# Patient Record
Sex: FEMALE | Race: WHITE | Employment: OTHER | URBAN - METROPOLITAN AREA
[De-identification: names, ages, dates, MRNs, and addresses within clinical notes are randomized per-mention and may not be internally consistent; named-entity substitution may affect disease eponyms.]

---

## 2017-01-27 ENCOUNTER — TELEPHONE (OUTPATIENT)
Dept: FAMILY MEDICINE CLINIC | Age: 72
End: 2017-01-27

## 2017-01-29 RX ORDER — BUPROPION HYDROCHLORIDE 150 MG/1
TABLET ORAL
Qty: 30 TAB | Refills: 0 | Status: SHIPPED | OUTPATIENT
Start: 2017-01-29 | End: 2017-02-27 | Stop reason: SDUPTHER

## 2017-01-31 RX ORDER — LEVOTHYROXINE SODIUM 112 UG/1
TABLET ORAL
Qty: 30 TAB | Refills: 0 | Status: SHIPPED | OUTPATIENT
Start: 2017-01-31 | End: 2017-03-05 | Stop reason: SDUPTHER

## 2017-02-13 NOTE — TELEPHONE ENCOUNTER
Routed to Pallavi Cohen, to call patient regarding need for follow up appt to establish care with new PCP>

## 2017-02-27 RX ORDER — NAPROXEN 500 MG/1
TABLET ORAL
Qty: 60 TAB | Refills: 0 | Status: SHIPPED | OUTPATIENT
Start: 2017-02-27 | End: 2017-04-04 | Stop reason: SDUPTHER

## 2017-02-27 RX ORDER — BUPROPION HYDROCHLORIDE 150 MG/1
TABLET ORAL
Qty: 30 TAB | Refills: 0 | Status: SHIPPED | OUTPATIENT
Start: 2017-02-27 | End: 2017-03-25 | Stop reason: SDUPTHER

## 2017-03-07 RX ORDER — LEVOTHYROXINE SODIUM 112 UG/1
TABLET ORAL
Qty: 30 TAB | Refills: 0 | Status: SHIPPED | OUTPATIENT
Start: 2017-03-07 | End: 2017-04-04 | Stop reason: SDUPTHER

## 2017-03-27 RX ORDER — BUPROPION HYDROCHLORIDE 150 MG/1
TABLET ORAL
Qty: 30 TAB | Refills: 0 | Status: SHIPPED | OUTPATIENT
Start: 2017-03-27 | End: 2017-05-01 | Stop reason: SDUPTHER

## 2017-04-03 ENCOUNTER — OFFICE VISIT (OUTPATIENT)
Dept: DERMATOLOGY | Facility: AMBULATORY SURGERY CENTER | Age: 72
End: 2017-04-03

## 2017-04-03 VITALS
TEMPERATURE: 98.6 F | DIASTOLIC BLOOD PRESSURE: 86 MMHG | OXYGEN SATURATION: 98 % | BODY MASS INDEX: 37.03 KG/M2 | HEIGHT: 63 IN | WEIGHT: 209 LBS | RESPIRATION RATE: 20 BRPM | SYSTOLIC BLOOD PRESSURE: 118 MMHG | HEART RATE: 56 BPM

## 2017-04-03 DIAGNOSIS — C44.319 BASAL CELL CARCINOMA OF LEFT MEDIAL CHEEK: Primary | ICD-10-CM

## 2017-04-03 NOTE — PROGRESS NOTES
Ms. Avila Call comes in for consultation. She is a 76-year-old lady with a basal cell carcinoma her left cheek. This has been present as long as 7 years, recently it became more raised with bleeding. Biopsy by Dr. Kirsten Dickey confirmed basal cell carcinoma, she presents to discuss surgical management. She resides independently. She ambulates using a walker. She is feeling well and in her usual state of health today. She has no pain, no current illnesses, no other skin concerns. Her allergies medications medical and social history are reviewed by me today. I have reviewed the history, physical exam, assessment and plan by Rob Cannon NP and agree. She is awake alert well appearing lady in no distress. I examined her face. Her left infraorbital cheek has a firm mobile pearly plaque 15 x 7 mm. It is focally pigmented. She confirms location. Basal cell carcinoma, left cheek. This is appropriate for treatment with Mohs surgery given the site and size of the skin cancer. The procedure was discussed with her, anticipated curvilinear primary closure was reviewed. Surgery scheduled for April 19, 2017.

## 2017-04-03 NOTE — MR AVS SNAPSHOT
Visit Information Date & Time Provider Department Dept. Phone Encounter #  
 4/3/2017  3:00 PM [place] [facility] EMILY [facility] [address] [phone] Your Appointments 4/4/2017  3:45 PM  
ROUTINE CARE with Jimena Stahl MD  
[facility]) Appt Note: f/up med check former opal; f/up med check former opal; f/up med check former opal [address] [id number]  
[phone]  
  
   
 [address] [address] 5/11/2017  1:00 PM  
SURGERY with Cj Terrell MD  
[address] [facility]) Appt Note: NP; BCC-Left Infraorbital cheek, Dr. Maricruz Smalls [facility] A [facility] [id number] [place] [address] [place]  [facility] [place] [address] Upcoming Health Maintenance Date Due COLONOSCOPY 1/1/2017 DTaP/Tdap/Td series (2 - Td) 2/28/2017 MEDICARE YEARLY EXAM 5/18/2017 GLAUCOMA SCREENING Q2Y 3/21/2018 BREAST CANCER SCRN MAMMOGRAM 4/12/2018 Allergies as of 4/3/2017  Review Complete On: 4/3/2017 By: Ghislaine Whaley LPN Severity Noted Reaction Type Reactions Adhes. Band-tape-benzalkonium  09/19/2011    Rash Alphagan P [Brimonidine]  04/03/2017    Other (comments) Severe reaction and damage to the cornea Current Immunizations  Reviewed on 8/7/2012 Name Date DTAP Vaccine 2/28/2007 Influenza Nasal Vaccine 9/20/2015 Influenza Vaccine Split 10/1/2011 Pneumococcal Conjugate (PCV-13) 1/1/2010 Pneumococcal Vaccine (Pcv) 4/1/2010 Pneumococcal Vaccine (Unspecified Type) 1/1/2005 Varicella Virus Vaccine Live 7/11/2008 Not reviewed this visit Vitals BP Pulse Temp Resp Height(growth percentile) Weight(growth percentile)  118/86 (BP 1 Location: Left arm, BP Patient Position: Sitting) (!) 56 98.6 °F (37 °C) (Oral) 20 5' 3\" (1.6 m) 209 lb (94.8 kg) SpO2 BMI OB Status Smoking Status 98% 37.02 kg/m2 Postmenopausal Former Smoker Vitals History BMI and BSA Data Body Mass Index Body Surface Area 37.02 kg/m 2 2.05 m 2 Preferred Pharmacy Pharmacy Name Phone [id number] [phone] Your Updated Medication List  
  
   
This list is accurate as of: 4/3/17  3:47 PM.  Always use your most recent med list.  
  
  
  
  
 ACIDOPHILUS EX STR (L. SPOROG) 35 million- 25 million cell Tab Generic drug:  acidophilus-sporogenes Take 1 Tab by mouth daily. alendronate 70 mg tablet Commonly known as:  FOSAMAX Take 1 Tab by mouth every Sunday. aspirin delayed-release 81 mg tablet Take 81 mg by mouth daily. buPROPion  mg tablet Commonly known as:  WELLBUTRIN XL  
TAKE 1 TABLET BY MOUTH EVERY DAY  
  
 COSOPT (PF) 2-0.5 % ophthalmic solution Generic drug:  dorzolamide-timolol (PF) Administer 1 Drop to both eyes two (2) times a day. dimethyl fumarate 240 mg Cpdr  
Take 240 mg by mouth two (2) times a day. DULoxetine 30 mg capsule Commonly known as:  CYMBALTA Take 30 mg by mouth daily. erythromycin ophthalmic ointment Commonly known as:  ILOTYCIN  
  
 levothyroxine 112 mcg tablet Commonly known as:  SYNTHROID  
TAKE 1 TABLET BY MOUTH DAILY BEFORE BREAKFAST  
  
 loperamide 2 mg capsule Commonly known as:  IMODIUM Take  by mouth as needed. losartan-hydroCHLOROthiazide 100-25 mg per tablet Commonly known as:  HYZAAR Take 1 Tab by mouth daily. For blood pressure  
  
 naproxen 500 mg tablet Commonly known as:  NAPROSYN  
TAKE 1 TABLET BY MOUTH TWICE A DAY AS NEEDED  
  
 * NEURONTIN 100 mg capsule Generic drug:  gabapentin Take 100 mg by mouth daily as needed (has standing 300mg hs dose). * gabapentin 300 mg capsule Commonly known as:  NEURONTIN Take 300 mg by mouth two (2) times a day. PYRIDIUM 200 mg tablet Generic drug:  phenazopyridine Take  by mouth three (3) times daily as needed for Pain. RESTASIS 0.05 % ophthalmic emulsion Generic drug:  cycloSPORINE Administer 1 Drop to both eyes two (2) times a day. simvastatin 40 mg tablet Commonly known as:  ZOCOR Take 1 Tab by mouth nightly. therapeutic multivitamin tablet Commonly known as:  [facility] Take 1 Tab by mouth daily. * timolol 0.5 % ophthalmic solution Commonly known as:  TIMOPTIC Administer 1 Drop to both eyes two (2) times a day. * timolol 0.5 % ophthalmic gel-forming Commonly known as:  TIMOPTIC-XE  
  
 traMADol 50 mg tablet Commonly known as:  ULTRAM  
TAKE 1 OR 2 TABLETS EVERY 4 HOURS AS NEEDED FOR PAIN (MAX IS 8 PER DAY) VITAMIN B-12 1,000 mcg tablet Generic drug:  cyanocobalamin Take 1,000 mcg by mouth daily. XALATAN 0.005 % ophthalmic solution Generic drug:  latanoprost  
Administer 1 Drop to both eyes nightly. * Notice: This list has 4 medication(s) that are the same as other medications prescribed for you. Read the directions carefully, and ask your doctor or other care provider to review them with you. To-Do List   
 04/18/2017 2:00 PM  
(Arrive by 1:45 PM) Appointment with Lali JEAN 1 at [address] [place] [address] ([phone]) Shower or bathe using soap and water. Do not use deodorant, powder, perfumes, or lotion the day of your exam.  If your prior mammograms were not performed at [facility] 6 please bring films with you or forward prior images 2 days before your procedure. Check in at registration 15min before your appointment time unless you were instructed to do otherwise.   A script is not necessary, but if you have one, please bring it on the day of the mammogram or have it faxed to the department. [facility] [phone] [facility]  [phone] [facility] [address]  [phone] [facility] [phone] [address] [address] [facility] Shant Harris [phone] Please arrive 15 minutes prior to appointment to register. Patient Instructions Self Skin Exam and Sunscreens Early detection and treatment is essential in the treatment of all forms of skin cancer. If caught early, all forms of skin cancer are curable. In addition to your regular visits, you should perform a monthly skin examination. Over time, you become familiar with what is normally found on your skin and can identify new or suspicious spots. One of the screening tools you can use to assess your skin is to follow the ABCDEs: 
 
A= Asymmetry (One half is unlike the other half) B= Border (An irregular, scalloped or poorly defined edge) C= Color (Is varied from one area to another, has shades of tan, brown/ black,       white, red or blue) D= Diameter (Spots larger than 6mm or a pencil eraser) E= Evolving (New spots or one that is changing in size, shape, or color) A follow- up interval will be customized based on your history of skin cancer or level of skin damage and risk factors. In any case, if you notice a suspicious or new spot, an appointment should be arranged between regular visits. Everyone should use sunscreen and sun-safe practices, which is especially important for those with a personal or family history of skin cancer. Suggestions for this include: 1. Use daily moisturizers containing SPF 30 or higher. 2. Wear long sleeve clothing with UPF ratings and a broad-brimmed hat. 3. Apply sunscreen with SPF 30 or higher to all sun exposed areas if you are going to be in the sun. A broad spectrum UVA/ UVB sunscreen is best.  Dont forget to REAPPLY every two hours or more often if swimming or sweating! 4.  Avoid outside activities during peak sun hours, especially in the summer (10am- 2pm). 5. DO NOT use tanning beds. Using sunscreen and sun-safe practices can help reduce the likelihood of developing skin cancer or additional skin cancers in those previously diagnosed. Introducing [facility]! [employer] introduces [employer] patient portal. Now you can access parts of your medical record, email your doctor's office, and request medication refills online. 1. In your internet browser, go to [email or web address]. [email or web address] 2. Click on the First Time User? Click Here link in the Sign In box. You will see the New Member Sign Up page. 3. Enter your [employer] Access Code exactly as it appears below. You will not need to use this code after youve completed the sign-up process. If you do not sign up before the expiration date, you must request a new code. · [employer] Access Code: [email or web address] Expires: 5/22/2017  1:05 PM 
 
4. Enter the last four digits of your Social Security Number (xxxx) and Date of Birth (mm/dd/yyyy) as indicated and click Submit. You will be taken to the next sign-up page. 5. Create a [employer] ID. This will be your [employer] login ID and cannot be changed, so think of one that is secure and easy to remember. 6. Create a [employer] password. You can change your password at any time. 7. Enter your Password Reset Question and Answer. This can be used at a later time if you forget your password. 8. Enter your e-mail address. You will receive e-mail notification when new information is available in [address] 9. Click Sign Up. You can now view and download portions of your medical record. 10. Click the Download Summary menu link to download a portable copy of your medical information. If you have questions, please visit the Frequently Asked Questions section of the [employer] website. Remember, [employer] is NOT to be used for urgent needs. For medical emergencies, dial 911. Now available from your iPhone and Android! Please provide this summary of care documentation to your next provider. Your primary care clinician is listed as NONE. If you have any questions after today's visit, please call [phone].

## 2017-04-04 ENCOUNTER — OFFICE VISIT (OUTPATIENT)
Dept: FAMILY MEDICINE CLINIC | Age: 72
End: 2017-04-04

## 2017-04-04 VITALS
HEIGHT: 63 IN | WEIGHT: 218 LBS | TEMPERATURE: 97.5 F | HEART RATE: 69 BPM | SYSTOLIC BLOOD PRESSURE: 138 MMHG | DIASTOLIC BLOOD PRESSURE: 80 MMHG | RESPIRATION RATE: 18 BRPM | BODY MASS INDEX: 38.62 KG/M2 | OXYGEN SATURATION: 97 %

## 2017-04-04 DIAGNOSIS — E78.5 HYPERLIPIDEMIA, UNSPECIFIED HYPERLIPIDEMIA TYPE: ICD-10-CM

## 2017-04-04 DIAGNOSIS — M19.90 ARTHRITIS: ICD-10-CM

## 2017-04-04 DIAGNOSIS — E03.9 ACQUIRED HYPOTHYROIDISM: ICD-10-CM

## 2017-04-04 DIAGNOSIS — R33.9 URINARY RETENTION: ICD-10-CM

## 2017-04-04 DIAGNOSIS — R53.1 LEFT-SIDED WEAKNESS: ICD-10-CM

## 2017-04-04 DIAGNOSIS — M81.6 LOCALIZED OSTEOPOROSIS WITHOUT CURRENT PATHOLOGICAL FRACTURE: ICD-10-CM

## 2017-04-04 DIAGNOSIS — E66.9 OBESITY (BMI 30-39.9): ICD-10-CM

## 2017-04-04 DIAGNOSIS — G35 MS (MULTIPLE SCLEROSIS) (HCC): ICD-10-CM

## 2017-04-04 DIAGNOSIS — R60.9 CHRONIC EDEMA: ICD-10-CM

## 2017-04-04 DIAGNOSIS — R39.9 URINARY SYMPTOM OR SIGN: Primary | ICD-10-CM

## 2017-04-04 RX ORDER — LEVOTHYROXINE SODIUM 112 UG/1
TABLET ORAL
Qty: 90 TAB | Refills: 4 | Status: SHIPPED | OUTPATIENT
Start: 2017-04-04 | End: 2018-05-18 | Stop reason: SDUPTHER

## 2017-04-04 RX ORDER — TRAMADOL HYDROCHLORIDE 50 MG/1
TABLET ORAL
Qty: 60 TAB | Refills: 1 | Status: SHIPPED | OUTPATIENT
Start: 2017-04-04 | End: 2017-07-15 | Stop reason: SDUPTHER

## 2017-04-04 RX ORDER — NAPROXEN 500 MG/1
TABLET ORAL
Qty: 60 TAB | Refills: 6 | Status: SHIPPED | OUTPATIENT
Start: 2017-04-04 | End: 2018-01-31 | Stop reason: SDUPTHER

## 2017-04-04 NOTE — PROGRESS NOTES
Lewis Hernández [address] [facility]  [address] [facility] [address] [place], [address]  [phone]             Date of visit: 4/4/17   Subjective:      History obtained from:  the patient.   Laron Honeycutt is a 67 y.o. female who presents today for new patient to me, used to see Dr. Audra Tan    Having some urinary odor and frequency, better today than it was a couple of weeks ago but would like urine culture, gets fairly frequent UTIs    Has pretty severe MS, followed by neurology and on new medication she thinks is helping  Has been doing PT since Nov at [facility] and it is helping  Thinks she has had MS since her 25s, used to pass out  Then in her 35s did better during her pregnancies  Took a long time to diagnose it, had mri followed by spinal tap  Has plaques in brain    Left side is weaker arm and leg  Doesn't have strength to change her sheets  Does grocery shopping with the motorized cart  Thinks her short term memory and word finding is affected  Last time she had PT her memory was off, but that has been 4-5 years ago    Sees a dermatologist for [facility] on knee and face  Was going to urologist but insurance doesn't cover him any more  Sits on commode for a fairly long time to urinate but wasn't having to self cath  Up about 3x at night to urinate, uses bedside commode then to prevent falls  Falls fairly often    Stopped fosamax, had side effects although doesn't remember what exactly  Would be willing to try reclast    Takes tramadol occasional for back, not often  Uses naproxen BID for arthritis pain  Has not tried tylenol in a while  Has pretty bad arthritis in multiple joints, including hands    Doesn't often check bp but takes her meds    Wears brace left ankle and seems to cause foot swelling at times, that is ongoing problem, not new, goes up and down    Patient Active Problem List    Diagnosis Date Noted    Obesity (BMI 30-39.9) 04/05/2017    Left-sided weakness 04/04/2017    Chronic edema 04/04/2017    CAD (coronary artery disease) 08/07/2012    Hyperlipidemia 08/07/2012    Hypothyroidism 08/07/2012    Normal cardiac stress test 08/07/2012    Lymphocytic colitis 08/07/2012    Renal stones 08/07/2012    Osteoarthritis 08/07/2012    Osteoporosis 08/07/2012    Hx of migraine headaches 08/07/2012    Psoriasis 08/07/2012    Glaucoma 08/07/2012    MS (multiple sclerosis) ([facility] [address] 10/07/2011    Fall from slip, trip, or stumble 10/07/2011    Urinary retention 10/07/2011    Fall 10/07/2011    HTN (hypertension) 11/10/2010    Reflux 11/10/2010    Multiple joint pain 11/10/2010    Arthritis 11/10/2010    Urine, incontinence, stress female 11/10/2010     Current Outpatient Prescriptions   Medication Sig Dispense Refill    levothyroxine (SYNTHROID) 112 mcg tablet TAKE 1 TABLET BY MOUTH DAILY BEFORE BREAKFAST 90 Tab 4    traMADol (ULTRAM) 50 mg tablet TAKE 1 OR 2 TABLETS EVERY 4 HOURS AS NEEDED FOR PAIN (MAX IS 8 PER DAY) 60 Tab 1    naproxen (NAPROSYN) 500 mg tablet TAKE 1 TABLET BY MOUTH TWICE A DAY AS NEEDED 60 Tab 6    buPROPion XL (WELLBUTRIN XL) 150 mg tablet TAKE 1 TABLET BY MOUTH EVERY DAY 30 Tab 0    simvastatin (ZOCOR) 40 mg tablet Take 1 Tab by mouth nightly. 90 Tab 1    losartan-hydrochlorothiazide (HYZAAR) 100-25 mg per tablet Take 1 Tab by mouth daily. For blood pressure 30 Tab 5    dimethyl fumarate 240 mg cpDR Take 240 mg by mouth two (2) times a day.  erythromycin (ILOTYCIN) ophthalmic ointment   1    timolol (TIMOPTIC-XE) 0.5 % ophthalmic gel-forming   3    DULoxetine (CYMBALTA) 30 mg capsule Take 30 mg by mouth daily. 11    gabapentin (NEURONTIN) 100 mg capsule Take 100 mg by mouth daily as needed (has standing 300mg hs dose).  phenazopyridine (PYRIDIUM) 200 mg tablet Take  by mouth three (3) times daily as needed for Pain.  dorzolamide-timolol, PF, (COSOPT, PF,) 2-0.5 % dpet Administer 1 Drop to both eyes two (2) times a day.       cycloSPORINE (RESTASIS) 0.05 % ophthalmic emulsion Administer 1 Drop to both eyes two (2) times a day.  therapeutic multivitamin (THERAGRAN) tablet Take 1 Tab by mouth daily.  timolol (TIMOPTIC) 0.5 % ophthalmic solution Administer 1 Drop to both eyes two (2) times a day.  acidophilus-sporogenes (ACIDOPHILUS EXTRA STRENGTH) 35 million- 25 million cell Tab Take 1 Tab by mouth daily.  aspirin delayed-release 81 mg tablet Take 81 mg by mouth daily.  loperamide (IMODIUM) 2 mg capsule Take  by mouth as needed.  cyanocobalamin (VITAMIN B-12) 1,000 mcg tablet Take 1,000 mcg by mouth daily. Allergies   Allergen Reactions    Adhes.  Band-Tape-Benzalkonium Rash    Alphagan P [Brimonidine] Other (comments)     Severe reaction and damage to the cornea     Past Medical History:   Diagnosis Date    Arthritis     Asthma     had it while she smoked cigaretts    Autoimmune disease ([address] 2004    pt has MS    Burning with urination     CAD (coronary artery disease) 8/7/2012    Chronic pain     Depression     Dyspepsia and other specified disorders of function of stomach     GERD (gastroesophageal reflux disease)     Glaucoma     Glaucoma 8/7/2012    Hx of migraine headaches 8/7/2012    Hyperlipidemia 8/7/2012    Hypertension     Hypothyroidism 8/7/2012    IFG (impaired fasting glucose) 8/7/2012    Lymphocytic colitis 8/7/2012    Morbid obesity ([address]     Multiple sclerosis ([address]     Normal cardiac stress test 8/7/2012    Osteoarthritis 8/7/2012    Other ill-defined conditions     COLITIS    Psoriasis 8/7/2012    Psychiatric disorder     anexity depression    Renal stones 8/7/2012    Sunburn, blistering     Thyroid disease      Past Surgical History:   Procedure Laterality Date    BREAST SURGERY PROCEDURE UNLISTED      cyst removal right breast    HX APPENDECTOMY      and lysis of adhesions    HX CATARACT REMOVAL      both    HX CHOLECYSTECTOMY  1994    lap    HX KNEE REPLACEMENT both    HX LYSIS OF ADHESIONS      HX ORTHOPAEDIC      BILATERAL KNEE REPLACEMENTS    HX TONSIL AND ADENOIDECTOMY       Family History   Problem Relation Age of Onset    COPD Mother     Lung Disease Mother     Coronary Artery Disease Father     Bipolar Disorder Father     Hypertension Father     Heart Disease Sister      A fib    Coronary Artery Disease Maternal Grandmother     Coronary Artery Disease Maternal Grandfather      Social History   Substance Use Topics    Smoking status: Former Smoker     Quit date: 9/19/2006    Smokeless tobacco: Never Used    Alcohol use Yes      Comment: 1 DRINK/MO      Social History     Social History Narrative    Lives with daughter        Review of Systems  Card: denies chest pain  Pulm: denies shortness of breath      Objective:     Vitals:    04/04/17 1550   BP: 138/80   Pulse: 69   Resp: 18   Temp: 97.5 °F (36.4 °C)   TempSrc: Oral   SpO2: 97%   Weight: 218 lb (98.9 kg)   Height: 5' 3\" (1.6 m)     Body mass index is 38.62 kg/(m^2).      General: stated age, obese and in NAD  Neck: supple, symmetrical, trachea midline, no adenopathy and thyroid: not enlarged, symmetric, no tenderness/mass/nodules  Lungs:  clear to auscultation w/o rales, rhonchi, wheezes w/normal effort and no use of accessory muscles of respiration   Heart: regular rate and rhythm, S1, S2 normal, no murmur, click, rub or gallop  Abdomen: soft, nontender  Ext:  No edema noted on right, 1+ on left ankle/foot   Lymph: no cervical adenopathy appreciated  Skin:  Normal. and no rash or abnormalities   Psych: alert and oriented to person, place, time and situation and Speech: appropriate quality, quantity and organization of sentences but has word finding difficulty frequently  Neuro: much difficulty getting up, even using walker    Lab Results   Component Value Date/Time    Hemoglobin A1c 5.4 05/17/2016 03:22 PM     Lab Results   Component Value Date/Time    Cholesterol, total 144 05/17/2016 03:22 PM HDL Cholesterol 63 05/17/2016 03:22 PM    LDL, calculated 52 05/17/2016 03:22 PM    VLDL, calculated 29 05/17/2016 03:22 PM    Triglyceride 145 05/17/2016 03:22 PM     Lab Results   Component Value Date/Time    Sodium 143 05/17/2016 03:22 PM    Potassium 4.3 05/17/2016 03:22 PM    Chloride 101 05/17/2016 03:22 PM    CO2 24 05/17/2016 03:22 PM    Anion gap 6 03/24/2015 12:50 PM    Glucose 88 05/17/2016 03:22 PM    BUN 24 05/17/2016 03:22 PM    Creatinine 0.61 05/17/2016 03:22 PM    BUN/Creatinine ratio 39 05/17/2016 03:22 PM    GFR est  05/17/2016 03:22 PM    GFR est non-AA 91 05/17/2016 03:22 PM    Calcium 9.9 05/17/2016 03:22 PM    Bilirubin, total 0.4 05/17/2016 03:22 PM    AST (SGOT) 20 05/17/2016 03:22 PM    Alk. phosphatase 75 05/17/2016 03:22 PM    Protein, total 6.4 05/17/2016 03:22 PM    Albumin 4.5 05/17/2016 03:22 PM    Globulin 2.9 03/24/2015 12:50 PM    A-G Ratio 1.2 03/24/2015 12:50 PM    ALT (SGPT) 17 05/17/2016 03:22 PM     Lab Results   Component Value Date/Time    WBC 6.3 05/17/2016 03:22 PM    HGB 14.3 05/17/2016 03:22 PM    HCT 43.9 05/17/2016 03:22 PM    PLATELET 492 32/99/1187 03:22 PM    MCV 91 05/17/2016 03:22 PM     Lab Results   Component Value Date/Time    TSH 1.990 05/17/2016 03:22 PM    T4, Free 1.43 06/11/2014 01:00 PM     Lab Results   Component Value Date/Time    VITAMIN D, 25-HYDROXY 43.5 02/02/2016 03:34 PM         Assessment/Plan:       ICD-10-CM ICD-9-CM    1. Urinary symptom or sign R39.9 788.99 CULTURE, URINE   2. MS (multiple sclerosis) (HCC) G35 340 CBC WITH AUTOMATED DIFF   3. Left-sided weakness R53.1 728.87    4. Urinary retention R33.9 788.20    5. Obesity (BMI 30-39. 9) E66.9 278.00    6. Chronic edema R60.9 782.3    7. Localized osteoporosis without current pathological fracture M81.6 733.09 REFERRAL TO INFUSION THERAPY   8. Hyperlipidemia, unspecified hyperlipidemia type V00.8 431.4 METABOLIC PANEL, COMPREHENSIVE      LIPID PANEL   9.  Acquired hypothyroidism E03.9 244.9 TSH 3RD GENERATION   10. Arthritis M19.90 716.90        Orders Placed This Encounter    CULTURE, URINE    CBC WITH AUTOMATED DIFF    METABOLIC PANEL, COMPREHENSIVE    TSH 3RD GENERATION    LIPID PANEL    REFERRAL TO INFUSION THERAPY    levothyroxine (SYNTHROID) 112 mcg tablet    traMADol (ULTRAM) 50 mg tablet    naproxen (NAPROSYN) 500 mg tablet     Overall doing well, despite many medical problems  Will take some time to get to know her; plan to work on polypharmacy more at next visit  No changes now except for will try tylenol instead of naproxen as it would be safer  Culture urine as requested  Will also try to get her reclast infusion; her osteoporosis is not terrible, but she falls frequently due to MS so is high risk for fracture  Refill tramadol for occasional use, doesn't use often  Had her sign contract  Prescription monitoring appropriate. Urinary retention followed by urology, not at point of needing self-cath    Discussed the diagnosis and plan and she expressed understanding. Follow-up Disposition:  Return in about 7 weeks (around 5/23/2017) for annual wellness visit.     Devin Pratt MD

## 2017-04-04 NOTE — MR AVS SNAPSHOT
Visit Information Date & Time Provider Department Dept. Phone Encounter #  
 4/4/2017  3:45 PM Stephen Gunderson, [facility] [phone] [id number] Follow-up Instructions Return in about 7 weeks (around 5/23/2017) for annual wellness visit. Your Appointments 4/19/2017 10:00 AM  
SURGERY with Kendrick Bowman MD  
[facility] [address] [facility]) Appt Note: Est Pt; BCC-Left Infraorbital cheek, Dr. Herb Aragon; Est Pt; BCC-Left Infraorbital cheek, Dr. Herb Aragon [facility] [id number]  
[address] [place] [id number] Upcoming Health Maintenance Date Due COLONOSCOPY 1/1/2017 DTaP/Tdap/Td series (2 - Td) 2/28/2017 MEDICARE YEARLY EXAM 5/18/2017 GLAUCOMA SCREENING Q2Y 3/21/2018 BREAST CANCER SCRN MAMMOGRAM 4/12/2018 Allergies as of 4/4/2017  Review Complete On: 4/4/2017 By: Stephen Gunderson MD  
  
 Severity Noted Reaction Type Reactions Adhes. Band-tape-benzalkonium  09/19/2011    Rash Alphagan P [Brimonidine]  04/03/2017    Other (comments) Severe reaction and damage to the cornea Current Immunizations  Reviewed on 8/7/2012 Name Date DTAP Vaccine 2/28/2007 Influenza Nasal Vaccine 9/20/2015 Influenza Vaccine Split 10/1/2011 Pneumococcal Conjugate (PCV-13) 1/1/2010 Pneumococcal Vaccine (Pcv) 4/1/2010 Pneumococcal Vaccine (Unspecified Type) 1/1/2005 Varicella Virus Vaccine Live 7/11/2008 Not reviewed this visit You Were Diagnosed With   
  
 Codes Comments Urinary symptom or sign    -  Primary ICD-10-CM: R39.9 ICD-9-CM: 788.99   
 MS [facility]multiple sclerosis) ([facility]     ICD-10-CM: G35 
ICD-9-CM: 600 Left-sided weakness     ICD-10-CM: R53.1 ICD-9-CM: 728.87 Urinary retention     ICD-10-CM: R33.9 ICD-9-CM: 788.20 Obesity (BMI 30-39. 9)     ICD-10-CM: E66.9 ICD-9-CM: 278.00 Chronic edema     ICD-10-CM: R60.9 ICD-9-CM: 782.3 Localized osteoporosis without current pathological fracture     ICD-10-CM: M81.6 ICD-9-CM: 733.09 Hyperlipidemia, unspecified hyperlipidemia type     ICD-10-CM: E78.5 ICD-9-CM: 272.4 Acquired hypothyroidism     ICD-10-CM: E03.9 ICD-9-CM: 244.9 Arthritis     ICD-10-CM: M19.90 ICD-9-CM: 716.90 Vitals BP Pulse Temp Resp Height(growth percentile) Weight(growth percentile) 138/80 (BP 1 Location: Right arm, BP Patient Position: Sitting) 69 97.5 °F (36.4 °C) (Oral) 18 5' 3\" (1.6 m) 218 lb (98.9 kg) SpO2 BMI OB Status Smoking Status 97% 38.62 kg/m2 Postmenopausal Former Smoker BMI and BSA Data Body Mass Index Body Surface Area  
 38.62 kg/m 2 2.1 m 2 Preferred Pharmacy Pharmacy Name Phone [facility]/PHARMACY #[id number]- Laura Faria, [place] [facility] [address] [phone] Your Updated Medication List  
  
   
This list is accurate as of: 4/4/17  4:23 PM.  Always use your most recent med list.  
  
  
  
  
 ACIDOPHILUS EX STR (L. SPOROG) 35 million- 25 million cell Tab Generic drug:  acidophilus-sporogenes Take 1 Tab by mouth daily. aspirin delayed-release 81 mg tablet Take 81 mg by mouth daily. buPROPion  mg tablet Commonly known as:  WELLBUTRIN XL  
TAKE 1 TABLET BY MOUTH EVERY DAY  
  
 COSOPT (PF) 2-0.5 % ophthalmic solution Generic drug:  dorzolamide-timolol (PF) Administer 1 Drop to both eyes two (2) times a day. dimethyl fumarate 240 mg Cpdr  
Take 240 mg by mouth two (2) times a day. DULoxetine 30 mg capsule Commonly known as:  CYMBALTA Take 30 mg by mouth daily. erythromycin ophthalmic ointment Commonly known as:  ILOTYCIN  
  
 levothyroxine 112 mcg tablet Commonly known as:  SYNTHROID  
TAKE 1 TABLET BY MOUTH DAILY BEFORE BREAKFAST  
  
 loperamide 2 mg capsule Commonly known as:  IMODIUM Take  by mouth as needed. losartan-hydroCHLOROthiazide 100-25 mg per tablet Commonly known as:  HYZAAR Take 1 Tab by mouth daily. For blood pressure  
  
 naproxen 500 mg tablet Commonly known as:  NAPROSYN  
TAKE 1 TABLET BY MOUTH TWICE A DAY AS NEEDED NEURONTIN 100 mg capsule Generic drug:  gabapentin Take 100 mg by mouth daily as needed (has standing 300mg hs dose). PYRIDIUM 200 mg tablet Generic drug:  phenazopyridine Take  by mouth three (3) times daily as needed for Pain. RESTASIS 0.05 % ophthalmic emulsion Generic drug:  cycloSPORINE Administer 1 Drop to both eyes two (2) times a day. simvastatin 40 mg tablet Commonly known as:  ZOCOR Take 1 Tab by mouth nightly. therapeutic multivitamin tablet Commonly known as:  [facility] Take 1 Tab by mouth daily. * timolol 0.5 % ophthalmic solution Commonly known as:  TIMOPTIC Administer 1 Drop to both eyes two (2) times a day. * timolol 0.5 % ophthalmic gel-forming Commonly known as:  TIMOPTIC-XE  
  
 traMADol 50 mg tablet Commonly known as:  ULTRAM  
TAKE 1 OR 2 TABLETS EVERY 4 HOURS AS NEEDED FOR PAIN (MAX IS 8 PER DAY) VITAMIN B-12 1,000 mcg tablet Generic drug:  cyanocobalamin Take 1,000 mcg by mouth daily. * Notice: This list has 2 medication(s) that are the same as other medications prescribed for you. Read the directions carefully, and ask your doctor or other care provider to review them with you. Prescriptions Printed Refills  
 traMADol (ULTRAM) 50 mg tablet 1 Sig: TAKE 1 OR 2 TABLETS EVERY 4 HOURS AS NEEDED FOR PAIN (MAX IS 8 PER DAY) Class: Print Prescriptions Sent to Pharmacy Refills  
 levothyroxine (SYNTHROID) 112 mcg tablet 4 Sig: TAKE 1 TABLET BY MOUTH DAILY BEFORE BREAKFAST  Class: Normal  
 Pharmacy: [facility]/pharmacy #[id number] [facility] [address] Ph #: [phone]  
 naproxen (NAPROSYN) 500 mg tablet 6 Sig: TAKE 1 TABLET BY MOUTH TWICE A DAY AS NEEDED Class: Normal  
 Pharmacy: [facility] Ph #: [phone] We Performed the Following CBC WITH AUTOMATED DIFF [58601 CPT(R)] CULTURE, URINE [id number] CPT(R)] LIPID PANEL [99366 CPT(R)] METABOLIC PANEL, COMPREHENSIVE [47982 CPT(R)] TSH 3RD GENERATION [71922 CPT(R)] Follow-up Instructions Return in about 7 weeks (around 5/23/2017) for annual wellness visit. To-Do List   
 04/18/2017 2:00 PM  
(Arrive by 1:45 PM) Appointment with Mir JEAN 1 at [address] [place] [address] ([phone]) Shower or bathe using soap and water. Do not use deodorant, powder, perfumes, or lotion the day of your exam.  If your prior mammograms were not performed at [facility] 6 please bring films with you or forward prior images 2 days before your procedure. Check in at registration 15min before your appointment time unless you were instructed to do otherwise. A script is not necessary, but if you have one, please bring it on the day of the mammogram or have it faxed to the department. [facility] [phone] [facility] [place]  [phone] [address]  [phone] [facility] [phone] [place] [id number] [facility] [phone] Please arrive 15 minutes prior to appointment to register. Patient Instructions Try tylenol arthritis instead of naproxen Probably won't work quite as well but is safer If it doesn't work you can go back on naproxen Leg and Ankle Edema: Care Instructions Your Care Instructions Swelling in the legs, ankles, and feet is called edema. It is common after you sit or stand for a while. Long plane flights or car rides often cause swelling in the legs and feet.  You may also have swelling if you have to stand for long periods of time at your job. Problems with the veins in the legs (varicose veins) and changes in hormones can also cause swelling. Sometimes the swelling in the ankles and feet is caused by a more serious problem, such as heart failure, infection, blood clots, or liver or kidney disease. Follow-up care is a key part of your treatment and safety. Be sure to make and go to all appointments, and call your doctor if you are having problems. Its also a good idea to know your test results and keep a list of the medicines you take. How can you care for yourself at home? · If your doctor gave you medicine, take it as prescribed. Call your doctor if you think you are having a problem with your medicine. · Whenever you are resting, raise your legs up. Try to keep the swollen area higher than the level of your heart. · Take breaks from standing or sitting in one position. ¨ Walk around to increase the blood flow in your lower legs. ¨ Move your feet and ankles often while you stand, or tighten and relax your leg muscles. · Wear support stockings. Put them on in the morning, before swelling gets worse. · Eat a balanced diet. Lose weight if you need to. · Limit the amount of salt (sodium) in your diet. Salt holds fluid in the body and may increase swelling. When should you call for help? Call 911 anytime you think you may need emergency care. For example, call if: 
· You have symptoms of a blood clot in your lung (called a pulmonary embolism). These may include: 
¨ Sudden chest pain. ¨ Trouble breathing. ¨ Coughing up blood. Call your doctor now or seek immediate medical care if: 
· You have signs of a blood clot, such as: 
¨ Pain in your calf, back of the knee, thigh, or groin. ¨ Redness and swelling in your leg or groin. · You have symptoms of infection, such as: 
¨ Increased pain, swelling, warmth, or redness. ¨ Red streaks or pus. ¨ A fever. Watch closely for changes in your health, and be sure to contact your doctor if: 
· Your swelling is getting worse. · You have new or worsening pain in your legs. · You do not get better as expected. Where can you learn more? Go to http://brittany-ramírez.info/. Enter T818 in the search box to learn more about \"Leg and Ankle Edema: Care Instructions. \" Current as of: May 27, 2016 Content Version: 11.2 © 8081-3484 [employer]. Care instructions adapted under license by [employer] (which disclaims liability or warranty for this information). If you have questions about a medical condition or this instruction, always ask your healthcare professional. [address] any warranty or liability for your use of this information. Introducing [facility]! Theo Zoilasteff introduces MomentCam patient portal. Now you can access parts of your medical record, email your doctor's office, and request medication refills online. 1. In your internet browser, go to [email or web address] 2. Click on the First Time User? Click Here link in the Sign In box. You will see the New Member Sign Up page. 3. Enter your MomentCam Access Code exactly as it appears below. You will not need to use this code after youve completed the sign-up process. If you do not sign up before the expiration date, you must request a new code. · MomentCam Access Code: [email or web address] Expires: 5/22/2017  1:05 PM 
 
4. Enter the last four digits of your Social Security Number (xxxx) and Date of Birth (mm/dd/yyyy) as indicated and click Submit. You will be taken to the next sign-up page. 5. Create a MomentCam ID. This will be your MomentCam login ID and cannot be changed, so think of one that is secure and easy to remember. 6. Create a MomentCam password. You can change your password at any time. 7. Enter your Password Reset Question and Answer.  This can be used at a later time if you forget your password. 8. Enter your e-mail address. You will receive e-mail notification when new information is available in [address] 9. Click Sign Up. You can now view and download portions of your medical record. 10. Click the Download Summary menu link to download a portable copy of your medical information. If you have questions, please visit the Frequently Asked Questions section of the [employer] website. Remember, [employer] is NOT to be used for urgent needs. For medical emergencies, dial 911. Now available from your iPhone and Android! Please provide this summary of care documentation to your next provider. Your primary care clinician is listed as Panda Stoll. If you have any questions after today's visit, please call [phone].

## 2017-04-04 NOTE — PROGRESS NOTES
Chief Complaint   Patient presents with    Multiple Sclerosis     to est. care with new provider , Dr. Rukhsana Adams s patient in past    Hypertension    Hypothyroidism       Reviewed Record in preparation for visit and have obtained necessary documentation. Identified pt with two pt identifiers (Name @ )    Health Maintenance Due   Topic    COLONOSCOPY     DTaP/Tdap/Td series (2 - Td)         1. Have you been to the ER, urgent care clinic since your last visit? Hospitalized since your last visit? No    2. Have you seen or consulted any other health care providers outside of the [address] [place] [address] since your last visit? Include any pap smears or colon screening.  No

## 2017-04-04 NOTE — PATIENT INSTRUCTIONS
Try tylenol arthritis instead of naproxen  Probably won't work quite as well but is safer  If it doesn't work you can go back on naproxen     Leg and Ankle Edema: Care Instructions  Your Care Instructions  Swelling in the legs, ankles, and feet is called edema. It is common after you sit or stand for a while. Long plane flights or car rides often cause swelling in the legs and feet. You may also have swelling if you have to stand for long periods of time at your job. Problems with the veins in the legs (varicose veins) and changes in hormones can also cause swelling. Sometimes the swelling in the ankles and feet is caused by a more serious problem, such as heart failure, infection, blood clots, or liver or kidney disease. Follow-up care is a key part of your treatment and safety. Be sure to make and go to all appointments, and call your doctor if you are having problems. Its also a good idea to know your test results and keep a list of the medicines you take. How can you care for yourself at home? · If your doctor gave you medicine, take it as prescribed. Call your doctor if you think you are having a problem with your medicine. · Whenever you are resting, raise your legs up. Try to keep the swollen area higher than the level of your heart. · Take breaks from standing or sitting in one position. ¨ Walk around to increase the blood flow in your lower legs. ¨ Move your feet and ankles often while you stand, or tighten and relax your leg muscles. · Wear support stockings. Put them on in the morning, before swelling gets worse. · Eat a balanced diet. Lose weight if you need to. · Limit the amount of salt (sodium) in your diet. Salt holds fluid in the body and may increase swelling. When should you call for help? Call 911 anytime you think you may need emergency care. For example, call if:  · You have symptoms of a blood clot in your lung (called a pulmonary embolism).  These may include:  ¨ Sudden chest pain.  ¨ Trouble breathing. ¨ Coughing up blood. Call your doctor now or seek immediate medical care if:  · You have signs of a blood clot, such as:  ¨ Pain in your calf, back of the knee, thigh, or groin. ¨ Redness and swelling in your leg or groin. · You have symptoms of infection, such as:  ¨ Increased pain, swelling, warmth, or redness. ¨ Red streaks or pus. ¨ A fever. Watch closely for changes in your health, and be sure to contact your doctor if:  · Your swelling is getting worse. · You have new or worsening pain in your legs. · You do not get better as expected. Where can you learn more? Go to http://brittany-ramírez.info/. Enter U594 in the search box to learn more about \"Leg and Ankle Edema: Care Instructions. \"  Current as of: May 27, 2016  Content Version: 11.2  © 4224-6676 [employer]. Care instructions adapted under license by [employer] (which disclaims liability or warranty for this information). If you have questions about a medical condition or this instruction, always ask your healthcare professional. [place] [address] any warranty or liability for your use of this information.

## 2017-04-05 PROBLEM — E66.9 OBESITY (BMI 30-39.9): Status: ACTIVE | Noted: 2017-04-05

## 2017-04-06 LAB
ALBUMIN SERPL-MCNC: 3.9 G/DL (ref 3.5–4.8)
ALBUMIN/GLOB SERPL: 1.6 {RATIO} (ref 1.2–2.2)
ALP SERPL-CCNC: 65 IU/L (ref 39–117)
ALT SERPL-CCNC: 17 IU/L (ref 0–32)
AST SERPL-CCNC: 19 IU/L (ref 0–40)
BASOPHILS # BLD AUTO: 0 X10E3/UL (ref 0–0.2)
BASOPHILS NFR BLD AUTO: 1 %
BILIRUB SERPL-MCNC: 0.4 MG/DL (ref 0–1.2)
BUN SERPL-MCNC: 17 MG/DL (ref 8–27)
BUN/CREAT SERPL: 30 (ref 12–28)
CALCIUM SERPL-MCNC: 9.6 MG/DL (ref 8.7–10.3)
CHLORIDE SERPL-SCNC: 102 MMOL/L (ref 96–106)
CHOLEST SERPL-MCNC: 161 MG/DL (ref 100–199)
CO2 SERPL-SCNC: 24 MMOL/L (ref 18–29)
CREAT SERPL-MCNC: 0.56 MG/DL (ref 0.57–1)
EOSINOPHIL # BLD AUTO: 0.2 X10E3/UL (ref 0–0.4)
EOSINOPHIL NFR BLD AUTO: 3 %
ERYTHROCYTE [DISTWIDTH] IN BLOOD BY AUTOMATED COUNT: 14.1 % (ref 12.3–15.4)
GLOBULIN SER CALC-MCNC: 2.5 G/DL (ref 1.5–4.5)
GLUCOSE SERPL-MCNC: 109 MG/DL (ref 65–99)
HCT VFR BLD AUTO: 44.6 % (ref 34–46.6)
HDLC SERPL-MCNC: 64 MG/DL
HGB BLD-MCNC: 14.5 G/DL (ref 11.1–15.9)
IMM GRANULOCYTES # BLD: 0 X10E3/UL (ref 0–0.1)
IMM GRANULOCYTES NFR BLD: 0 %
INTERPRETATION, 910389: NORMAL
LDLC SERPL CALC-MCNC: 71 MG/DL (ref 0–99)
LYMPHOCYTES # BLD AUTO: 0.6 X10E3/UL (ref 0.7–3.1)
LYMPHOCYTES NFR BLD AUTO: 13 %
MCH RBC QN AUTO: 29.8 PG (ref 26.6–33)
MCHC RBC AUTO-ENTMCNC: 32.5 G/DL (ref 31.5–35.7)
MCV RBC AUTO: 92 FL (ref 79–97)
MONOCYTES # BLD AUTO: 0.4 X10E3/UL (ref 0.1–0.9)
MONOCYTES NFR BLD AUTO: 9 %
NEUTROPHILS # BLD AUTO: 3.4 X10E3/UL (ref 1.4–7)
NEUTROPHILS NFR BLD AUTO: 74 %
PLATELET # BLD AUTO: 252 X10E3/UL (ref 150–379)
POTASSIUM SERPL-SCNC: 4.6 MMOL/L (ref 3.5–5.2)
PROT SERPL-MCNC: 6.4 G/DL (ref 6–8.5)
RBC # BLD AUTO: 4.86 X10E6/UL (ref 3.77–5.28)
SODIUM SERPL-SCNC: 144 MMOL/L (ref 134–144)
TRIGL SERPL-MCNC: 128 MG/DL (ref 0–149)
TSH SERPL DL<=0.005 MIU/L-ACNC: 2.19 UIU/ML (ref 0.45–4.5)
VLDLC SERPL CALC-MCNC: 26 MG/DL (ref 5–40)
WBC # BLD AUTO: 4.6 X10E3/UL (ref 3.4–10.8)

## 2017-04-07 LAB — BACTERIA UR CULT: NORMAL

## 2017-04-08 NOTE — PROGRESS NOTES
Sent in letter; All of your tests were normal, including blood counts, kidney, liver, sugar, electrolytes, thyroid, and cholesterol. Urine had just a tiny amount of bacteria; not enough to diagnose an infection. However, if your symptoms worsen we should check the culture again.

## 2017-04-18 ENCOUNTER — HOSPITAL ENCOUNTER (OUTPATIENT)
Dept: MAMMOGRAPHY | Age: 72
Discharge: HOME | End: 2017-04-18
Attending: FAMILY MEDICINE
Payer: MEDICARE

## 2017-04-18 DIAGNOSIS — Z12.31 VISIT FOR SCREENING MAMMOGRAM: ICD-10-CM

## 2017-04-18 PROCEDURE — 77067 SCR MAMMO BI INCL CAD: CPT

## 2017-04-19 ENCOUNTER — OFFICE VISIT (OUTPATIENT)
Dept: DERMATOLOGY | Facility: AMBULATORY SURGERY CENTER | Age: 72
End: 2017-04-19

## 2017-04-19 VITALS
BODY MASS INDEX: 38.62 KG/M2 | TEMPERATURE: 98.2 F | HEART RATE: 65 BPM | WEIGHT: 218 LBS | RESPIRATION RATE: 20 BRPM | SYSTOLIC BLOOD PRESSURE: 119 MMHG | DIASTOLIC BLOOD PRESSURE: 78 MMHG | OXYGEN SATURATION: 97 % | HEIGHT: 63 IN

## 2017-04-19 DIAGNOSIS — C44.319 BASAL CELL CARCINOMA OF LEFT CHEEK: Primary | ICD-10-CM

## 2017-04-19 RX ORDER — LIDOCAINE HYDROCHLORIDE AND EPINEPHRINE 10; 10 MG/ML; UG/ML
3 INJECTION, SOLUTION INFILTRATION; PERINEURAL ONCE
Qty: 1 VIAL | Refills: 0
Start: 2017-04-19 | End: 2017-04-19

## 2017-04-19 RX ORDER — BUPIVACAINE HYDROCHLORIDE AND EPINEPHRINE 2.5; 5 MG/ML; UG/ML
INJECTION, SOLUTION INFILTRATION; PERINEURAL
Qty: 1.5 ML | Refills: 0
Start: 2017-04-19 | End: 2019-04-04

## 2017-04-19 NOTE — PROGRESS NOTES
Pre-op: Patient present today for the evaluation of basal cell carcinoma of the left infraorbital cheek. Procedure explained with full understanding. Vitals:    04/19/17 1106   BP: 119/78   Pulse: 65   Resp: 20   Temp: 98.2 °F (36.8 °C)   TempSrc: Oral   SpO2: 97%   Weight: 98.9 kg (218 lb)   Height: 5' 3\" (1.6 m)     preoperatively, will continue to monitor. Post-op: Written and verbal post-op wound care instructions given to patient with full understanding of care. Surgical wound bandaged with Vaseline, Telfa, 2x2 gauze, and coverall tape. All questions and concerns addressed. Vitals stable postoperatively.

## 2017-04-19 NOTE — PATIENT INSTRUCTIONS
WOUND CARE INSTRUCTIONS    1. Keep the dressing clean and dry and do not remove for 48 hours. 2. Then change the dressing once a day as follows:  a. Wash hands before and after each dressing change. b. Remove dressing and wash site gently with mild soap and water, rinse, and pat dry.  c. Apply an ointment (Bacitracin, Polysporin, Neosporin, Petroleum jelly or Aquaphor). d. Apply a non-stick (Telfa) dressing or Band-Aid to cover the wound. Remove pressure bandage Friday, then wash gently and apply Vaseline and a band-aid to site daily for 1 week. 3. Watch for:  BLEEDING: A small amount of drainage may occur. If bleeding occurs, elevate and rest the surgery site. Apply gauze and steady pressure for 15 minutes. If bleeding continues, call this office. INFECTION: Signs of infection include increased redness, pain, warmth, drainage of pus, and fever. If this occurs, call this office. 4. Special Instructions (follow any that are checked):  · [] You have stitches that need to be removed in 0 days  · [x] Avoid bending at the waist and heavy lifting for two days. · [x] Sleep with your head elevated for the next two nights. · [x] Rest the surgery site and keep it elevated as much as possible for two days. · [x] You may apply an ice-pack for 10-15 minutes every waking hour for the rest of the day. · [] Eat a soft diet and avoid hot food and hot drinks for the rest of the day. · [] Other instructions: Follow up as needed  Take Tylenol for pain as needed. Once the site is healed with no remaining bandages or open areas, protect your surgical site and scar from the sun, as this area will be more sensitive. Use a broad spectrum sunscreen SPF 30 or higher daily, and a chemical free product (one containing zinc oxide or titanium dioxide) is a good choice if the area is sensitive. You may begin to gently massage the surgical site in 2-3 weeks, rubbing in a circular motion along the scar.  This can help reduce swelling and thickness of a scar. A scar cream may be used beginnning 1 month after the surgery. If you have any questions or concerns, please call our office Monday through Friday at [phone].

## 2017-04-19 NOTE — PROGRESS NOTES
This note is written by Yoana Morris, as dictated by Benita Abel. Azucena Higgins MD.    CC: Basal cell carcinoma on the left infraorbital cheek    History of present illness:     Melba Prasad is a 67 y.o. female referred by Dr. Kayla Dalton. She has a biopsy-proven basal cell carcinoma on the left infraorbital cheek. This is a new basal cell carcinoma present for approximately 7 years described as a persistent bump with gradual growth and bleeding, no prior treatment. Biopsy confirmed the diagnosis of basal cell carcinoma, and I reviewed the written pathology. She is feeling well and in her usual state of health today. She has no pain, no current illnesses, no other skin concerns. Her allergies, medications, medical, and social history are reviewed by me today. Exam:     She is an awake, alert, and oriented 67 y.o. female who appears well and in no distress. There is no preauricular, submandibular, or cervical lymphadenopathy. I examined her face. She has a  17 x 7 mm pigmented and pearly plaque on her left infraorbital cheek. She confirms location. Assessment/plan:    1. Basal cell carcinoma, left infraorbital cheek. I discussed the diagnosis of basal cell carcinoma and summarized the pathology report. Mohs surgery is indicated by site and size. The procedure was discussed, verbal and written consent were obtained. I performed the procedure. One stage was required to reach a tumor free plane. The surgical defect was managed with complex repair. There were no complications. She will follow up as needed as the site heals. Indications, risks, and options were discussed with Melba Prasad preoperatively. Risks including, but not limited to: pain, bleeding, infection, tumor recurrence, scarring and damage to motor and/or sensory nerves, were discussed. Melba Prasad chose Mohs surgery. Melba Prasad was an acceptable surgery candidate.     Melba Prasad was placed in the appropriate position on the operating table in the Mohs surgery procedure room. The area was prepped and draped in the standard manner. Gentian violet was used to outline the clinical margins of the tumor. Local anesthesia was then obtained. The grossly visible tumor was then removed, an underlying layer was excised and mapped according to the Mohs technique, and the individual specimens examined microscopically. The process was repeated until microscopic examination of the tissue specimens confirmed a tumor-free plane. Hemostasis was obtained with electrosurgery and pressure. The wound was covered between stages with moist saline gauze. Wound care instructions (written and verbal) and a follow up appointment were given to Edi Blevins before discharge. Edi Blevins was discharged in good condition. The wound management options of second intent healing, layered closure, local flap, and/or full thickness skin graft were discussed. Edi Blevins understands the aims, risks, alternatives, and possible complications and elects to proceed with a complex layered closure. Wound margins were made vertical, edges undermined in the subcutaneous  plane, standing cones corrected at both poles followed by layered closure. The wound was closed with buried 6-0 polysorb suture in the muscle and deep subcutis to reduce width of the wound, a second layer in the dermis to reduce tension on the skin edges, and skin edges were approximated with 6-0 fast gut suture. The final closure length was 36 mm. The wound was bandaged with Vaseline, Telfa, gauze and Coverroll. Wound care instructions (written and verbal) and a follow up appointment were given to Edi Blevins before discharge. Edi Blevins was discharged in good condition. 2. History of skin cancer. I discussed the diagnosis and recommend routine examinations with Dr. Lucho Alva for surveillance.     The documentation recorded by the scribe accurately reflects the service I personally performed and the decisions made by me. [facility]   OFFICE PROCEDURE PROGRESS NOTE     Chart reviewed for the following:     Gabino Voss MD, have reviewed the History, Physical and updated the Allergic reactions for [employer]    TIME OUT performed immediately prior to start of procedure:     Gabino Degroot. Ysabel Voss MD, have performed the following reviews on Mimi Lal prior to the start of the procedure:     * Patient was identified by name and date of birth   * Agreement on procedure being performed was verified   * Risks and Benefits explained to the patient   * Procedure site verified and marked as necessary   * Patient was positioned for comfort   * Consent was signed and verified     Time: 11:30 AM  Date of procedure: 4/19/2017  Procedure performed by: Yasmany De Souza.  Ysabel Voss MD   Provider assisted by: LPN   Patient assisted by: self   How tolerated by patient: tolerated the procedure well with no complications   Comments: none

## 2017-04-19 NOTE — MR AVS SNAPSHOT
Visit Information Date & Time Provider Department Dept. Phone Encounter #  
 4/19/2017 10:00 AM Amelie Ramirez MD [facility] [address] [phone] [id number] Upcoming Health Maintenance Date Due COLONOSCOPY 1/1/2017 DTaP/Tdap/Td series (2 - Td) 2/28/2017 MEDICARE YEARLY EXAM 5/18/2017 GLAUCOMA SCREENING Q2Y 3/21/2018 BREAST CANCER SCRN MAMMOGRAM 4/12/2018 Allergies as of 4/19/2017  Review Complete On: 4/19/2017 By: Elier Rucker LPN Severity Noted Reaction Type Reactions Adhes. Band-tape-benzalkonium  09/19/2011    Rash Alphagan P [Brimonidine]  04/03/2017    Other (comments) Severe reaction and damage to the cornea Current Immunizations  Reviewed on 8/7/2012 Name Date DTAP Vaccine 2/28/2007 Influenza Nasal Vaccine 9/20/2015 Influenza Vaccine Split 10/1/2011 Pneumococcal Conjugate (PCV-13) 1/1/2010 Pneumococcal Vaccine (Pcv) 4/1/2010 Pneumococcal Vaccine (Unspecified Type) 1/1/2005 Varicella Virus Vaccine Live 7/11/2008 Not reviewed this visit You Were Diagnosed With   
  
 Codes Comments Basal cell carcinoma of left cheek    -  Primary ICD-10-CM: C44.319 ICD-9-CM: 173.31 Vitals BP Pulse Temp Resp Height(growth percentile) Weight(growth percentile) 119/78 (BP 1 Location: Right arm, BP Patient Position: Sitting) 65 98.2 °F (36.8 °C) (Oral) 20 5' 3\" (1.6 m) 218 lb (98.9 kg) SpO2 BMI OB Status Smoking Status 97% 38.62 kg/m2 Postmenopausal Former Smoker Vitals History BMI and BSA Data Body Mass Index Body Surface Area  
 38.62 kg/m 2 2.1 m 2 Preferred Pharmacy Pharmacy Name Phone [id number] [place], [address] [phone] Your Updated Medication List  
  
   
This list is accurate as of: 4/19/17 11:39 AM.  Always use your most recent med list.  
  
  
  
  
 ACIDOPHILUS EX STR (L. SPOROG) 35 million- 25 million cell Tab Generic drug:  acidophilus-sporogenes Take 1 Tab by mouth daily. aspirin delayed-release 81 mg tablet Take 81 mg by mouth daily. buPROPion  mg tablet Commonly known as:  WELLBUTRIN XL  
TAKE 1 TABLET BY MOUTH EVERY DAY  
  
 COSOPT (PF) 2-0.5 % ophthalmic solution Generic drug:  dorzolamide-timolol (PF) Administer 1 Drop to both eyes two (2) times a day. dimethyl fumarate 240 mg Cpdr  
Take 240 mg by mouth two (2) times a day. DULoxetine 30 mg capsule Commonly known as:  CYMBALTA Take 30 mg by mouth daily. erythromycin ophthalmic ointment Commonly known as:  ILOTYCIN  
  
 levothyroxine 112 mcg tablet Commonly known as:  SYNTHROID  
TAKE 1 TABLET BY MOUTH DAILY BEFORE BREAKFAST  
  
 loperamide 2 mg capsule Commonly known as:  IMODIUM Take  by mouth as needed. losartan-hydroCHLOROthiazide 100-25 mg per tablet Commonly known as:  HYZAAR Take 1 Tab by mouth daily. For blood pressure  
  
 naproxen 500 mg tablet Commonly known as:  NAPROSYN  
TAKE 1 TABLET BY MOUTH TWICE A DAY AS NEEDED NEURONTIN 100 mg capsule Generic drug:  gabapentin Take 100 mg by mouth daily as needed (has standing 300mg hs dose). PYRIDIUM 200 mg tablet Generic drug:  phenazopyridine Take  by mouth three (3) times daily as needed for Pain. RESTASIS 0.05 % ophthalmic emulsion Generic drug:  cycloSPORINE Administer 1 Drop to both eyes two (2) times a day. simvastatin 40 mg tablet Commonly known as:  ZOCOR Take 1 Tab by mouth nightly. therapeutic multivitamin tablet Commonly known as:  [facility] Take 1 Tab by mouth daily. * timolol 0.5 % ophthalmic solution Commonly known as:  TIMOPTIC Administer 1 Drop to both eyes two (2) times a day. * timolol 0.5 % ophthalmic gel-forming Commonly known as:  TIMOPTIC-XE  
  
 traMADol 50 mg tablet Commonly known as:  ULTRAM  
TAKE 1 OR 2 TABLETS EVERY 4 HOURS AS NEEDED FOR PAIN (MAX IS 8 PER DAY) VITAMIN B-12 1,000 mcg tablet Generic drug:  cyanocobalamin Take 1,000 mcg by mouth daily. * Notice: This list has 2 medication(s) that are the same as other medications prescribed for you. Read the directions carefully, and ask your doctor or other care provider to review them with you. Patient Instructions WOUND CARE INSTRUCTIONS 1. Keep the dressing clean and dry and do not remove for 48 hours. 2. Then change the dressing once a day as follows: 
a. Wash hands before and after each dressing change. b. Remove dressing and wash site gently with mild soap and water, rinse, and pat dry. 
c. Apply an ointment (Bacitracin, Polysporin, Neosporin, Petroleum jelly or Aquaphor). d. Apply a non-stick (Telfa) dressing or Band-Aid to cover the wound. Remove pressure bandage Friday, then wash gently and apply Vaseline and a band-aid to site daily for 1 week. 3. Watch for: BLEEDING: A small amount of drainage may occur. If bleeding occurs, elevate and rest the surgery site. Apply gauze and steady pressure for 15 minutes. If bleeding continues, call this office. INFECTION: Signs of infection include increased redness, pain, warmth, drainage of pus, and fever. If this occurs, call this office. 4. Special Instructions (follow any that are checked): ·  You have stitches that need to be removed in 0 days ·  Avoid bending at the waist and heavy lifting for two days. ·  Sleep with your head elevated for the next two nights. ·  Rest the surgery site and keep it elevated as much as possible for two days. ·  You may apply an ice-pack for 10-15 minutes every waking hour for the rest of the day. ·  Eat a soft diet and avoid hot food and hot drinks for the rest of the day. ·  Other instructions: Follow up as needed Take Tylenol for pain as needed. Once the site is healed with no remaining bandages or open areas, protect your surgical site and scar from the sun, as this area will be more sensitive. Use a broad spectrum sunscreen SPF 30 or higher daily, and a chemical free product (one containing zinc oxide or titanium dioxide) is a good choice if the area is sensitive. You may begin to gently massage the surgical site in 2-3 weeks, rubbing in a circular motion along the scar. This can help reduce swelling and thickness of a scar. A scar cream may be used beginnning 1 month after the surgery. If you have any questions or concerns, please call our office Monday through Friday at [phone]. Introducing [facility]! [facility] introduces Ambassador patient portal. Now you can access parts of your medical record, email your doctor's office, and request medication refills online. 1. In your internet browser, go to [email or web address] 2. Click on the First Time User? Click Here link in the Sign In box. You will see the New Member Sign Up page. 3. Enter your Ambassador Access Code exactly as it appears below. You will not need to use this code after youve completed the sign-up process. If you do not sign up before the expiration date, you must request a new code. · Ambassador Access Code: [email or web address] Expires: 5/22/2017  1:05 PM 
 
4. Enter the last four digits of your Social Security Number (xxxx) and Date of Birth (mm/dd/yyyy) as indicated and click Submit. You will be taken to the next sign-up page. 5. Create a AccurICt ID. This will be your Ambassador login ID and cannot be changed, so think of one that is secure and easy to remember. 6. Create a Ambassador password. You can change your password at any time. 7. Enter your Password Reset Question and Answer. This can be used at a later time if you forget your password. 8. Enter your e-mail address.  You will receive e-mail notification when new information is available in Aristotl. 9. Click Sign Up. You can now view and download portions of your medical record. 10. Click the Download Summary menu link to download a portable copy of your medical information. If you have questions, please visit the Frequently Asked Questions section of the Aristotl website. Remember, Aristotl is NOT to be used for urgent needs. For medical emergencies, dial 911. Now available from your iPhone and Android! Please provide this summary of care documentation to your next provider. Your primary care clinician is listed as Theador Heaps. If you have any questions after today's visit, please call [phone].

## 2017-05-01 RX ORDER — LOSARTAN POTASSIUM AND HYDROCHLOROTHIAZIDE 25; 100 MG/1; MG/1
1 TABLET ORAL DAILY
Qty: 90 TAB | Refills: 1 | Status: SHIPPED | OUTPATIENT
Start: 2017-05-01 | End: 2018-05-18 | Stop reason: SDUPTHER

## 2017-05-01 RX ORDER — BUPROPION HYDROCHLORIDE 150 MG/1
150 TABLET ORAL
Qty: 90 TAB | Refills: 1 | Status: SHIPPED | OUTPATIENT
Start: 2017-05-01 | End: 2018-01-22 | Stop reason: SDUPTHER

## 2017-05-01 NOTE — TELEPHONE ENCOUNTER
Received faxed refill request for this medication from the pharmacy that is on file. Requesting a 90 day supply. losartan-hydroCHLOROthiazide (HYZAAR) 100-25 mg per tablet  Take 1 Tab by mouth daily.  For blood pressure, Normal, Disp-30 Tab, R-5

## 2017-05-01 NOTE — TELEPHONE ENCOUNTER
Received faxed refill request for this medication from the pharmacy that is on file. Requesting a 90 day supply.     buPROPion XL (WELLBUTRIN XL) 150 mg tablet  Normal, Disp-30 Tab, R-0

## 2017-05-02 RX ORDER — SIMVASTATIN 40 MG/1
TABLET, FILM COATED ORAL
Qty: 90 TAB | Refills: 1 | Status: SHIPPED | OUTPATIENT
Start: 2017-05-02 | End: 2018-01-11 | Stop reason: SDUPTHER

## 2017-06-05 ENCOUNTER — HOSPITAL ENCOUNTER (OUTPATIENT)
Dept: MRI IMAGING | Age: 72
Discharge: HOME | End: 2017-06-05
Attending: ORTHOPAEDIC SURGERY
Payer: MEDICARE

## 2017-06-05 DIAGNOSIS — M25.512 LEFT SHOULDER PAIN: ICD-10-CM

## 2017-06-05 DIAGNOSIS — M75.122 COMPLETE ROTATOR CUFF TEAR OF LEFT SHOULDER: ICD-10-CM

## 2017-06-05 DIAGNOSIS — M75.121 COMPLETE TEAR OF RIGHT ROTATOR CUFF: ICD-10-CM

## 2017-06-05 DIAGNOSIS — M75.41 IMPINGEMENT SYNDROME OF RIGHT SHOULDER: ICD-10-CM

## 2017-06-05 DIAGNOSIS — M75.42 IMPINGEMENT SYNDROME OF LEFT SHOULDER: ICD-10-CM

## 2017-06-05 DIAGNOSIS — M25.511 RIGHT SHOULDER PAIN: ICD-10-CM

## 2017-06-05 PROCEDURE — 73221 MRI JOINT UPR EXTREM W/O DYE: CPT

## 2017-07-15 ENCOUNTER — OFFICE VISIT (OUTPATIENT)
Dept: FAMILY MEDICINE CLINIC | Age: 72
End: 2017-07-15

## 2017-07-15 ENCOUNTER — TELEPHONE (OUTPATIENT)
Dept: FAMILY MEDICINE CLINIC | Age: 72
End: 2017-07-15

## 2017-07-15 VITALS
TEMPERATURE: 97.9 F | SYSTOLIC BLOOD PRESSURE: 120 MMHG | DIASTOLIC BLOOD PRESSURE: 70 MMHG | HEIGHT: 63 IN | OXYGEN SATURATION: 95 % | RESPIRATION RATE: 16 BRPM | WEIGHT: 209.8 LBS | BODY MASS INDEX: 37.17 KG/M2 | HEART RATE: 70 BPM

## 2017-07-15 DIAGNOSIS — R07.81 RIB PAIN ON RIGHT SIDE: ICD-10-CM

## 2017-07-15 DIAGNOSIS — I10 ESSENTIAL HYPERTENSION: ICD-10-CM

## 2017-07-15 DIAGNOSIS — M81.6 LOCALIZED OSTEOPOROSIS WITHOUT CURRENT PATHOLOGICAL FRACTURE: ICD-10-CM

## 2017-07-15 DIAGNOSIS — Z00.00 MEDICARE ANNUAL WELLNESS VISIT, SUBSEQUENT: Primary | ICD-10-CM

## 2017-07-15 DIAGNOSIS — N31.9 NEUROGENIC BLADDER: ICD-10-CM

## 2017-07-15 DIAGNOSIS — Z01.810 PREOP CARDIOVASCULAR EXAM: ICD-10-CM

## 2017-07-15 DIAGNOSIS — G35 MS (MULTIPLE SCLEROSIS) (HCC): ICD-10-CM

## 2017-07-15 DIAGNOSIS — G89.29 CHRONIC RIGHT SHOULDER PAIN: ICD-10-CM

## 2017-07-15 DIAGNOSIS — M25.50 MULTIPLE JOINT PAIN: ICD-10-CM

## 2017-07-15 DIAGNOSIS — M25.511 CHRONIC RIGHT SHOULDER PAIN: ICD-10-CM

## 2017-07-15 RX ORDER — TRAMADOL HYDROCHLORIDE 50 MG/1
TABLET ORAL
Qty: 120 TAB | Refills: 2 | Status: SHIPPED | OUTPATIENT
Start: 2017-07-15 | End: 2018-05-17 | Stop reason: SDUPTHER

## 2017-07-15 RX ORDER — BRINZOLAMIDE 10 MG/ML
1 SUSPENSION/ DROPS OPHTHALMIC 2 TIMES DAILY
COMMUNITY

## 2017-07-15 RX ORDER — GABAPENTIN 100 MG/1
300 CAPSULE ORAL
COMMUNITY
Start: 2017-07-15 | End: 2021-01-05

## 2017-07-15 NOTE — PROGRESS NOTES
Chief Complaint   Patient presents with    Pre-op Exam     Right shoulder rotator cuff surgery 7/18/17 Dr. Hernandez Batch      1. Have you been to the ER, urgent care clinic since your last visit? Hospitalized since your last visit? No    2. Have you seen or consulted any other health care providers outside of the [address] [place] [address] since your last visit? Include any pap smears or colon screening.  Yes Dr. Rekha Isaac DOCUMENTATION\"

## 2017-07-15 NOTE — MR AVS SNAPSHOT
Visit Information Date & Time Provider Department Dept. Phone Encounter #  
 7/15/2017 10:00 AM Ramesh Quintanilla, [address] [phone] [id number] Follow-up Instructions Return in about 3 months (around 10/15/2017). Upcoming Health Maintenance Date Due COLONOSCOPY 1/1/2017 DTaP/Tdap/Td series (2 - Td) 2/28/2017 MEDICARE YEARLY EXAM 5/18/2017 INFLUENZA AGE 9 TO ADULT 8/1/2017 GLAUCOMA SCREENING Q2Y 3/21/2018 BREAST CANCER SCRN MAMMOGRAM 4/18/2019 Allergies as of 7/15/2017  Review Complete On: 7/15/2017 By: Ramesh Quintanilla MD  
  
 Severity Noted Reaction Type Reactions Adhes. Band-tape-benzalkonium  09/19/2011    Rash Alphagan P [Brimonidine]  04/03/2017    Other (comments) Severe reaction and damage to the cornea Current Immunizations  Reviewed on 8/7/2012 Name Date DTAP Vaccine 2/28/2007 Influenza Nasal Vaccine 9/20/2015 Influenza Vaccine Split 10/1/2011 Pneumococcal Conjugate (PCV-13) 1/1/2010 Pneumococcal Vaccine (Pcv) 4/1/2010 Pneumococcal Vaccine (Unspecified Type) 1/1/2005 Varicella Virus Vaccine Live 7/11/2008 Not reviewed this visit You Were Diagnosed With   
  
 Codes Comments Medicare annual wellness visit, subsequent    -  Primary ICD-10-CM: Z00.00 ICD-9-CM: V70.0 MS [facility]) ([facility]     ICD-10-CM: G35 
ICD-9-CM: 315 Neurogenic bladder     ICD-10-CM: N31.9 ICD-9-CM: 596.54 Chronic right shoulder pain     ICD-10-CM: M25.511, G89.29 ICD-9-CM: 719.41, 338.29 Multiple joint pain     ICD-10-CM: M25.50 ICD-9-CM: 719.49 Rib pain on right side     ICD-10-CM: R07.81 ICD-9-CM: 786.50 Essential hypertension     ICD-10-CM: I10 
ICD-9-CM: 401.9 Localized osteoporosis without current pathological fracture     ICD-10-CM: M81.6 ICD-9-CM: 733.09 Vitals BP Pulse Temp Resp Height(growth percentile) Weight(growth percentile) 120/70 70 97.9 °F (36.6 °C) (Oral) 16 5' 3\" (1.6 m) 209 lb 12.8 oz (95.2 kg) SpO2 BMI OB Status Smoking Status 95% 37.16 kg/m2 Postmenopausal Former Smoker Vitals History BMI and BSA Data Body Mass Index Body Surface Area  
 37.16 kg/m 2 2.06 m 2 Preferred Pharmacy Pharmacy Name Phone [id number] Goyal [phone] Your Updated Medication List  
  
   
This list is accurate as of: 7/15/17 11:10 AM.  Always use your most recent med list.  
  
  
  
  
 ACIDOPHILUS EX STR (L. SPOROG) 35 million- 25 million cell Tab Generic drug:  acidophilus-sporogenes Take 1 Tab by mouth daily. aspirin delayed-release 81 mg tablet Take 81 mg by mouth daily. AZOPT 1 % ophthalmic suspension Generic drug:  brinzolamide Administer 1 Drop to both eyes two (2) times a day. bupivacaine-EPINEPHrine 0.25 %-1:200,000 Soln Commonly known as:  [place] Used for mohs surgery buPROPion  mg tablet Commonly known as:  Artice January Take 1 Tab by mouth every morning. dimethyl fumarate 240 mg Cpdr  
Take 240 mg by mouth two (2) times a day. DULoxetine 30 mg capsule Commonly known as:  CYMBALTA Take 30 mg by mouth daily. erythromycin ophthalmic ointment Commonly known as:  ILOTYCIN  
  
 levothyroxine 112 mcg tablet Commonly known as:  SYNTHROID  
TAKE 1 TABLET BY MOUTH DAILY BEFORE BREAKFAST  
  
 loperamide 2 mg capsule Commonly known as:  IMODIUM Take  by mouth as needed. losartan-hydroCHLOROthiazide 100-25 mg per tablet Commonly known as:  HYZAAR Take 1 Tab by mouth daily. For blood pressure  
  
 naproxen 500 mg tablet Commonly known as:  NAPROSYN  
TAKE 1 TABLET BY MOUTH TWICE A DAY AS NEEDED NEURONTIN 100 mg capsule Generic drug:  gabapentin Take 3 Caps by mouth nightly as needed (rarely uses for leg pain). PYRIDIUM 200 mg tablet Generic drug:  phenazopyridine Take  by mouth three (3) times daily as needed for Pain. RESTASIS 0.05 % ophthalmic emulsion Generic drug:  cycloSPORINE Administer 1 Drop to both eyes two (2) times a day. simvastatin 40 mg tablet Commonly known as:  ZOCOR  
TAKE 1 TABLET BY MOUTH NIGHTLY. therapeutic multivitamin tablet Commonly known as:  [facility] Take 1 Tab by mouth daily. traMADol 50 mg tablet Commonly known as:  ULTRAM  
TAKE 1 OR 2 TABLETS EVERY 4 HOURS AS NEEDED FOR PAIN (MAX IS 8 PER DAY) VITAMIN B-12 1,000 mcg tablet Generic drug:  cyanocobalamin Take 1,000 mcg by mouth daily. Prescriptions Printed Refills  
 traMADol (ULTRAM) 50 mg tablet 2 Sig: TAKE 1 OR 2 TABLETS EVERY 4 HOURS AS NEEDED FOR PAIN (MAX IS 8 PER DAY) Class: Print We Performed the Following PAIN MGMT PANEL W/REFL, UR [[id number] Custom] Follow-up Instructions Return in about 3 months (around 10/15/2017). To-Do List   
 07/15/2017 Imaging:  XR RIBS RT W PA CXR MIN 3 V Patient Instructions (Preventive care checklist to be included in patient instructions) Discussed today Done Previously Not Needed   
 x  Pneumococcal vaccines  
 x  Flu vaccine  
  x Hepatitis B vaccine (if at risk) x  Shingles vaccine X after surgery   TDAP vaccine  
 x  Mammogram  
  x Pap smear X after surgery   Colorectal cancer screening  
  x Low-dose CT for lung cancer screening X reclast after surgery x  Bone density test  
 x  Glaucoma screening  
 x  Cholesterol test  
 x  Diabetes screening test   
  x Diabetes self-management class  
  x Nutritionist referral for diabetes or renal disease DASH Diet: Care Instructions Your Care Instructions The DASH diet is an eating plan that can help lower your blood pressure. DASH stands for Dietary Approaches to Stop Hypertension. Hypertension is high blood pressure. The DASH diet focuses on eating foods that are high in calcium, potassium, and magnesium. These nutrients can lower blood pressure. The foods that are highest in these nutrients are fruits, vegetables, low-fat dairy products, nuts, seeds, and legumes. But taking calcium, potassium, and magnesium supplements instead of eating foods that are high in those nutrients does not have the same effect. The DASH diet also includes whole grains, fish, and poultry. The DASH diet is one of several lifestyle changes your doctor may recommend to lower your high blood pressure. Your doctor may also want you to decrease the amount of sodium in your diet. Lowering sodium while following the DASH diet can lower blood pressure even further than just the DASH diet alone. Follow-up care is a key part of your treatment and safety. Be sure to make and go to all appointments, and call your doctor if you are having problems. It's also a good idea to know your test results and keep a list of the medicines you take. How can you care for yourself at home? Following the DASH diet · Eat 4 to 5 servings of fruit each day. A serving is 1 medium-sized piece of fruit, ½ cup chopped or canned fruit, 1/4 cup dried fruit, or 4 ounces (½ cup) of fruit juice. Choose fruit more often than fruit juice. · Eat 4 to 5 servings of vegetables each day. A serving is 1 cup of lettuce or raw leafy vegetables, ½ cup of chopped or cooked vegetables, or 4 ounces (½ cup) of vegetable juice. Choose vegetables more often than vegetable juice. · Get 2 to 3 servings of low-fat and fat-free dairy each day. A serving is 8 ounces of milk, 1 cup of yogurt, or 1 ½ ounces of cheese. · Eat 6 to 8 servings of grains each day. A serving is 1 slice of bread, 1 ounce of dry cereal, or ½ cup of cooked rice, pasta, or cooked cereal. Try to choose whole-grain products as much as possible. · Limit lean meat, poultry, and fish to 2 servings each day.  A serving is 3 ounces, about the size of a deck of cards. · Eat 4 to 5 servings of nuts, seeds, and legumes (cooked dried beans, lentils, and split peas) each week. A serving is 1/3 cup of nuts, 2 tablespoons of seeds, or ½ cup of cooked beans or peas. · Limit fats and oils to 2 to 3 servings each day. A serving is 1 teaspoon of vegetable oil or 2 tablespoons of salad dressing. · Limit sweets and added sugars to 5 servings or less a week. A serving is 1 tablespoon jelly or jam, ½ cup sorbet, or 1 cup of lemonade. · Eat less than 2,300 milligrams (mg) of sodium a day. If you limit your sodium to 1,500 mg a day, you can lower your blood pressure even more. Tips for success · Start small. Do not try to make dramatic changes to your diet all at once. You might feel that you are missing out on your favorite foods and then be more likely to not follow the plan. Make small changes, and stick with them. Once those changes become habit, add a few more changes. · Try some of the following: ¨ Make it a goal to eat a fruit or vegetable at every meal and at snacks. This will make it easy to get the recommended amount of fruits and vegetables each day. ¨ Try yogurt topped with fruit and nuts for a snack or healthy dessert. ¨ Add lettuce, tomato, cucumber, and onion to sandwiches. ¨ Combine a ready-made pizza crust with low-fat mozzarella cheese and lots of vegetable toppings. Try using tomatoes, squash, spinach, broccoli, carrots, cauliflower, and onions. ¨ Have a variety of cut-up vegetables with a low-fat dip as an appetizer instead of chips and dip. ¨ Sprinkle sunflower seeds or chopped almonds over salads. Or try adding chopped walnuts or almonds to cooked vegetables. ¨ Try some vegetarian meals using beans and peas. Add garbanzo or kidney beans to salads. Make burritos and tacos with mashed ramirez beans or black beans. Where can you learn more? Go to http://soto-ramírez.info/. Enter D163 in the search box to learn more about \"DASH Diet: Care Instructions. \" Current as of: April 3, 2017 Content Version: 11.3 © 0222-9368 [employer], [employer]. Care instructions adapted under license by [employer] (which disclaims liability or warranty for this information). If you have questions about a medical condition or this instruction, always ask your healthcare professional. [address] any warranty or liability for your use of this information. Introducing [facility]! [employer] introduces NewsMaven patient portal. Now you can access parts of your medical record, email your doctor's office, and request medication refills online. 1. In your internet browser, go to [email or web address] 2. Click on the First Time User? Click Here link in the Sign In box. You will see the New Member Sign Up page. 3. Enter your NewsMaven Access Code exactly as it appears below. You will not need to use this code after youve completed the sign-up process. If you do not sign up before the expiration date, you must request a new code. · NewsMaven Access Code: [email or web address] Expires: 8/31/2017 12:58 PM 
 
4. Enter the last four digits of your Social Security Number (xxxx) and Date of Birth (mm/dd/yyyy) as indicated and click Submit. You will be taken to the next sign-up page. 5. Create a NewsMaven ID. This will be your NewsMaven login ID and cannot be changed, so think of one that is secure and easy to remember. 6. Create a NewsMaven password. You can change your password at any time. 7. Enter your Password Reset Question and Answer. This can be used at a later time if you forget your password. 8. Enter your e-mail address. You will receive e-mail notification when new information is available in [address] 9. Click Sign Up. You can now view and download portions of your medical record. 10. Click the Download Summary menu link to download a portable copy of your medical information. If you have questions, please visit the Frequently Asked Questions section of the Face++ website. Remember, Face++ is NOT to be used for urgent needs. For medical emergencies, dial 911. Now available from your iPhone and Android! Please provide this summary of care documentation to your next provider. Your primary care clinician is listed as Jorge Luis Arce. If you have any questions after today's visit, please call [phone].

## 2017-07-15 NOTE — LETTER
7/15/2017 10:59 AM 
 
Ms. Josh Campoverde [address] [facility] [address] Current Outpatient Prescriptions:  
  brinzolamide (AZOPT) 1 % ophthalmic suspension, Administer 1 Drop to both eyes two (2) times a day., Disp: , Rfl:  
  gabapentin (NEURONTIN) 100 mg capsule, Take 3 Caps by mouth nightly as needed (rarely uses for leg pain). , Disp: , Rfl:  
  simvastatin (ZOCOR) 40 mg tablet, TAKE 1 TABLET BY MOUTH NIGHTLY., Disp: 90 Tab, Rfl: 1 
  buPROPion XL (WELLBUTRIN XL) 150 mg tablet, Take 1 Tab by mouth every morning., Disp: 90 Tab, Rfl: 1 
  losartan-hydroCHLOROthiazide (HYZAAR) 100-25 mg per tablet, Take 1 Tab by mouth daily. For blood pressure, Disp: 90 Tab, Rfl: 1 
  bupivacaine-EPINEPHrine (MARCAINE-EPINEPHRINE) 0.25 %-1:200,000 soln, Used for mohs surgery, Disp: 1.5 mL, Rfl: 0 
  levothyroxine (SYNTHROID) 112 mcg tablet, TAKE 1 TABLET BY MOUTH DAILY BEFORE BREAKFAST, Disp: 90 Tab, Rfl: 4 
  traMADol (ULTRAM) 50 mg tablet, TAKE 1 OR 2 TABLETS EVERY 4 HOURS AS NEEDED FOR PAIN (MAX IS 8 PER DAY), Disp: 60 Tab, Rfl: 1 
  naproxen (NAPROSYN) 500 mg tablet, TAKE 1 TABLET BY MOUTH TWICE A DAY AS NEEDED, Disp: 60 Tab, Rfl: 6 
  dimethyl fumarate 240 mg cpDR, Take 240 mg by mouth two (2) times a day., Disp: , Rfl:  
  erythromycin (ILOTYCIN) ophthalmic ointment, , Disp: , Rfl: 1   DULoxetine (CYMBALTA) 30 mg capsule, Take 30 mg by mouth daily. , Disp: , Rfl: 11 
  phenazopyridine (PYRIDIUM) 200 mg tablet, Take  by mouth three (3) times daily as needed for Pain., Disp: , Rfl:  
  cycloSPORINE (RESTASIS) 0.05 % ophthalmic emulsion, Administer 1 Drop to both eyes two (2) times a day., Disp: , Rfl:  
  therapeutic multivitamin (THERAGRAN) tablet, Take 1 Tab by mouth daily. , Disp: , Rfl:  
  acidophilus-sporogenes (ACIDOPHILUS EXTRA STRENGTH) 35 million- 25 million cell Tab, Take 1 Tab by mouth daily. , Disp: , Rfl:  
   aspirin delayed-release 81 mg tablet, Take 81 mg by mouth daily. , Disp: , Rfl:  
  loperamide (IMODIUM) 2 mg capsule, Take  by mouth as needed. , Disp: , Rfl:  
  cyanocobalamin (VITAMIN B-12) 1,000 mcg tablet, Take 1,000 mcg by mouth daily. , Disp: , Rfl:

## 2017-07-15 NOTE — TELEPHONE ENCOUNTER
Called to let her know xray ok  She expressed understanding but also stated concern about urine for drugs; she does use MJ nightly, really helps her pain, has used it for years. Told her we would have to discuss more at next appt due to laws, but likely low risk and not a concern right now. She expressed understanding.

## 2017-07-15 NOTE — PATIENT INSTRUCTIONS
(Preventive care checklist to be included in patient instructions)  Discussed today Done Previously Not Needed     x  Pneumococcal vaccines    x  Flu vaccine     x Hepatitis B vaccine (if at risk)    x  Shingles vaccine   X after surgery   TDAP vaccine    x  Mammogram     x Pap smear   X after surgery   Colorectal cancer screening     x Low-dose CT for lung cancer screening   X reclast after surgery x  Bone density test    x  Glaucoma screening    x  Cholesterol test    x  Diabetes screening test      x Diabetes self-management class     x Nutritionist referral for diabetes or renal disease          DASH Diet: Care Instructions  Your Care Instructions  The DASH diet is an eating plan that can help lower your blood pressure. DASH stands for Dietary Approaches to Stop Hypertension. Hypertension is high blood pressure. The DASH diet focuses on eating foods that are high in calcium, potassium, and magnesium. These nutrients can lower blood pressure. The foods that are highest in these nutrients are fruits, vegetables, low-fat dairy products, nuts, seeds, and legumes. But taking calcium, potassium, and magnesium supplements instead of eating foods that are high in those nutrients does not have the same effect. The DASH diet also includes whole grains, fish, and poultry. The DASH diet is one of several lifestyle changes your doctor may recommend to lower your high blood pressure. Your doctor may also want you to decrease the amount of sodium in your diet. Lowering sodium while following the DASH diet can lower blood pressure even further than just the DASH diet alone. Follow-up care is a key part of your treatment and safety. Be sure to make and go to all appointments, and call your doctor if you are having problems. It's also a good idea to know your test results and keep a list of the medicines you take. How can you care for yourself at home? Following the DASH diet  · Eat 4 to 5 servings of fruit each day.  A serving is 1 medium-sized piece of fruit, ½ cup chopped or canned fruit, 1/4 cup dried fruit, or 4 ounces (½ cup) of fruit juice. Choose fruit more often than fruit juice. · Eat 4 to 5 servings of vegetables each day. A serving is 1 cup of lettuce or raw leafy vegetables, ½ cup of chopped or cooked vegetables, or 4 ounces (½ cup) of vegetable juice. Choose vegetables more often than vegetable juice. · Get 2 to 3 servings of low-fat and fat-free dairy each day. A serving is 8 ounces of milk, 1 cup of yogurt, or 1 ½ ounces of cheese. · Eat 6 to 8 servings of grains each day. A serving is 1 slice of bread, 1 ounce of dry cereal, or ½ cup of cooked rice, pasta, or cooked cereal. Try to choose whole-grain products as much as possible. · Limit lean meat, poultry, and fish to 2 servings each day. A serving is 3 ounces, about the size of a deck of cards. · Eat 4 to 5 servings of nuts, seeds, and legumes (cooked dried beans, lentils, and split peas) each week. A serving is 1/3 cup of nuts, 2 tablespoons of seeds, or ½ cup of cooked beans or peas. · Limit fats and oils to 2 to 3 servings each day. A serving is 1 teaspoon of vegetable oil or 2 tablespoons of salad dressing. · Limit sweets and added sugars to 5 servings or less a week. A serving is 1 tablespoon jelly or jam, ½ cup sorbet, or 1 cup of lemonade. · Eat less than 2,300 milligrams (mg) of sodium a day. If you limit your sodium to 1,500 mg a day, you can lower your blood pressure even more. Tips for success  · Start small. Do not try to make dramatic changes to your diet all at once. You might feel that you are missing out on your favorite foods and then be more likely to not follow the plan. Make small changes, and stick with them. Once those changes become habit, add a few more changes. · Try some of the following:  ¨ Make it a goal to eat a fruit or vegetable at every meal and at snacks.  This will make it easy to get the recommended amount of fruits and vegetables each day. ¨ Try yogurt topped with fruit and nuts for a snack or healthy dessert. ¨ Add lettuce, tomato, cucumber, and onion to sandwiches. ¨ Combine a ready-made pizza crust with low-fat mozzarella cheese and lots of vegetable toppings. Try using tomatoes, squash, spinach, broccoli, carrots, cauliflower, and onions. ¨ Have a variety of cut-up vegetables with a low-fat dip as an appetizer instead of chips and dip. ¨ Sprinkle sunflower seeds or chopped almonds over salads. Or try adding chopped walnuts or almonds to cooked vegetables. ¨ Try some vegetarian meals using beans and peas. Add garbanzo or kidney beans to salads. Make burritos and tacos with mashed ramirez beans or black beans. Where can you learn more? Go to http://brittany-ramírez.info/. Enter F781 in the search box to learn more about \"DASH Diet: Care Instructions. \"  Current as of: April 3, 2017  Content Version: 11.3  © 6498-0756 [employer]. Care instructions adapted under license by [employer] (which disclaims liability or warranty for this information). If you have questions about a medical condition or this instruction, always ask your healthcare professional. [address] any warranty or liability for your use of this information.

## 2017-07-15 NOTE — PROGRESS NOTES
Lewis Hernández [address] [facility]  [id number] [place] [address] [address]. [facility], [address]  [phone]           Date of visit: 7/15/2017       This is an Subsequent Medicare Annual Wellness Visit (AWV), (Performed more than 12 months after effective date of Medicare Part B enrollment and 12 months after last preventive visit, Once in a lifetime)    I have reviewed the patient's medical history in detail and updated the computerized patient record. Inna Waterman is a 67 y.o. female   History obtained from: the patient. Concerns today   -getting right rotator cuff complete tear fixed next week, needs preop clearance, brought form for us to fax  -will have preop labs and ekg on Monday at [facility]  Both shoulders have a problem but right worse, left side is weaker from [address] so really misses being able to use the right side  -did have a fall yesterday making a turn with walker, not sure what happened, fell on chair, then to floor. Right side is sore, hurts to breath  -first fall in 6 months, used to fall more frequently. Home is safe, has been evaluated by PT/OT  -otherwise has been doing ok  -does have long history of neurogenic bladder, had to self cath in the past but has done ok in recent years. Surgery has caused retention before  Feels ready for the surgery  MS followed regularly by neurology    History     Patient Active Problem List   Diagnosis Code    HTN (hypertension) I10    Reflux K21.9    Multiple joint pain M25.50    Arthritis M19.90    Urine, incontinence, stress female N39.3    MS (multiple sclerosis) ([facility] [address] G35    Fall from slip, trip, or stumble W01. 0XXA    Urinary retention R33.9    Fall W19. Lacinda Sly    CAD (coronary artery disease) I25.10    Hyperlipidemia E78.5    Hypothyroidism E03.9    Normal cardiac stress test Z13.6    Lymphocytic colitis K52.832    Renal stones N20.0    Osteoarthritis M19.90    Osteoporosis M81.0    Hx of migraine headaches Z86.69    Psoriasis L40.9    Glaucoma H40.9    Left-sided weakness R53.1    Chronic edema R60.9    Obesity (BMI 30-39. 9) E66.9     Past Medical History:   Diagnosis Date    Arthritis     Asthma     had it while she smoked cigaretts    Autoimmune disease ([address] 2004    pt has MS    Burning with urination     CAD (coronary artery disease) 8/7/2012    Chronic pain     Depression     Dyspepsia and other specified disorders of function of stomach     GERD (gastroesophageal reflux disease)     Glaucoma     Glaucoma 8/7/2012    Hx of migraine headaches 8/7/2012    Hyperlipidemia 8/7/2012    Hypertension     Hypothyroidism 8/7/2012    IFG (impaired fasting glucose) 8/7/2012    Lymphocytic colitis 8/7/2012    Morbid obesity ([address]     Multiple sclerosis ([facility] [address]     Normal cardiac stress test 8/7/2012    Osteoarthritis 8/7/2012    Other ill-defined conditions     COLITIS    Psoriasis 8/7/2012    Psychiatric disorder     anexity depression    Renal stones 8/7/2012    Sunburn, blistering     Thyroid disease       Past Surgical History:   Procedure Laterality Date    BREAST SURGERY PROCEDURE UNLISTED      cyst removal right breast    HX APPENDECTOMY      and lysis of adhesions    HX BREAST BIOPSY Right     many yrs ago; neg    HX CATARACT REMOVAL      both    HX CHOLECYSTECTOMY  1994    lap    HX KNEE REPLACEMENT      both    HX LYSIS OF ADHESIONS      HX MOHS PROCEDURES  04/19/2017    BCC left infraorbital cheek by Dr. Antwon Watson. Band-Tape-Benzalkonium Rash    Alphagan P [Brimonidine] Other (comments)     Severe reaction and damage to the cornea     Current Outpatient Prescriptions   Medication Sig Dispense Refill    brinzolamide (AZOPT) 1 % ophthalmic suspension Administer 1 Drop to both eyes two (2) times a day.       gabapentin (NEURONTIN) 100 mg capsule Take 3 Caps by mouth nightly as needed (rarely uses for leg pain).  traMADol (ULTRAM) 50 mg tablet TAKE 1 OR 2 TABLETS EVERY 4 HOURS AS NEEDED FOR PAIN (MAX IS 8 PER DAY) 120 Tab 2    simvastatin (ZOCOR) 40 mg tablet TAKE 1 TABLET BY MOUTH NIGHTLY. 90 Tab 1    buPROPion XL (WELLBUTRIN XL) 150 mg tablet Take 1 Tab by mouth every morning. 90 Tab 1    losartan-hydroCHLOROthiazide (HYZAAR) 100-25 mg per tablet Take 1 Tab by mouth daily. For blood pressure 90 Tab 1    bupivacaine-EPINEPHrine (MARCAINE-EPINEPHRINE) 0.25 %-1:200,000 soln Used for mohs surgery 1.5 mL 0    levothyroxine (SYNTHROID) 112 mcg tablet TAKE 1 TABLET BY MOUTH DAILY BEFORE BREAKFAST 90 Tab 4    naproxen (NAPROSYN) 500 mg tablet TAKE 1 TABLET BY MOUTH TWICE A DAY AS NEEDED 60 Tab 6    dimethyl fumarate 240 mg cpDR Take 240 mg by mouth two (2) times a day.  erythromycin (ILOTYCIN) ophthalmic ointment   1    DULoxetine (CYMBALTA) 30 mg capsule Take 30 mg by mouth daily. 11    phenazopyridine (PYRIDIUM) 200 mg tablet Take  by mouth three (3) times daily as needed for Pain.  cycloSPORINE (RESTASIS) 0.05 % ophthalmic emulsion Administer 1 Drop to both eyes two (2) times a day.  therapeutic multivitamin (THERAGRAN) tablet Take 1 Tab by mouth daily.  acidophilus-sporogenes (ACIDOPHILUS EXTRA STRENGTH) 35 million- 25 million cell Tab Take 1 Tab by mouth daily.  aspirin delayed-release 81 mg tablet Take 81 mg by mouth daily.  loperamide (IMODIUM) 2 mg capsule Take  by mouth as needed.  cyanocobalamin (VITAMIN B-12) 1,000 mcg tablet Take 1,000 mcg by mouth daily.        Family History   Problem Relation Age of Onset   [facility] COPD Mother    [facility] Lung Disease Mother     Breast Cancer Mother 43    Coronary Artery Disease Father     Bipolar Disorder Father     Hypertension Father     Heart Disease Sister      A fib    Coronary Artery Disease Maternal Grandmother     Coronary Artery Disease Maternal Grandfather      Social History   Substance Use Topics    Smoking status: Former Smoker     Quit date: 9/19/2006    Smokeless tobacco: Never Used    Alcohol use Yes      Comment: 1 DRINK/MO       Specialists/Care Team   US Mark Krishna has established care with the following healthcare providers:  Patient Care Team:  Armando Leiva MD as PCP - General (Family Practice)  John Fontana MD (Cardiology)  Salvador Perkins MD (Neurology)  Ajay Tran NP (Dermatology)  Tammi Honeycutt MD (Ophthalmology)    Health Risk Assessment     Demographics   female  67 y.o. General Health Questions   -During the past 4 weeks:   -how would you rate your health in general? Fair   -how often have you been bothered by feeling dizzy when standing up? occasionally, has vertigo, PT tried to get them right but didn't work so think some of it may be due to [address]   -how much have you been bothered by bodily pain? moderately   -Have you noticed any hearing difficulties? no   -has your physical and emotional health limited your social activities with family or friends? Yes, very hard to get out    Emotional Health Questions   -Do you have a history of depression, anxiety, or emotional problems? no  -Over the past 2 weeks, have you felt down, depressed or hopeless? No but is tired of the pain  -Over the past 2 weeks, have you felt little interest or pleasure in doing things? no    Health Habits   Please describe your diet habits: grapes, blueberries, chicken vegetable soup  Do you get 5 servings of fruits or vegetables daily? Not quite  Do you exercise regularly? Not able, but she does get some exercise just getting around the house, doing housework, without cardiac symptoms    Activities of Daily Living and Functional Status   -Do you need help with eating, walking, dressing, bathing, toileting, the phone, transportation, shopping, preparing meals, housework, laundry, medications or managing money?  Sometimes needs help with shopping, lashae when very hot, but otherwise independent  -In the past four weeks, was someone available to help you if you needed and wanted help with anything? yes  -Are you confident are you that you can control and manage most of your health problems? yes  -Have you been given information to help you keep track of your medications? yes  -How often do you have trouble taking your medications as prescribed? never    Fall Risk and Home Safety   Have you fallen 2 or more times in the past year? yes  Does your home have rugs in the hallway, lack grab bars in the bathroom, lack handrails on the stairs or have poor lighting? no  Do you have smoke detectors and check them regularly? yes  Do you have difficulties driving a car? no  Do you always fasten your seat belt when you are in a car? yes    Review of Systems (if indicated for problems addressed today)   Card: denies chest pain  Pulm: denies shortness of breath    Physical Examination     Vitals:    07/15/17 1034 07/15/17 1104   BP: 149/52 120/70   Pulse: 70    Resp: 16    Temp: 97.9 °F (36.6 °C)    TempSrc: Oral    SpO2: 95%    Weight: 209 lb 12.8 oz (95.2 kg)    Height: 5' 3\" (1.6 m)      Body mass index is 37.16 kg/(m^2). No exam data present  Was the patient's timed Up & Go test unsteady or longer than 30 seconds?  yes    Evaluation of Cognitive Function   Mood/affect:  happy  Orientation: normal  Appearance: age appropriate  Family member/caregiver input: daughter thinks her memory is ok    Additional exam if indicated for problems addressed today:  General: stated age, obese and in NAD  Neck: supple, symmetrical, trachea midline, no adenopathy and thyroid: not enlarged, symmetric, no tenderness/mass/nodules  Lungs:  clear to auscultation w/o rales, rhonchi, wheezes w/normal effort and no use of accessory muscles of respiration   Heart: regular rate and rhythm, S1, S2 normal, no murmur, click, rub or gallop  Abdomen: soft, nontender  MSK: difficulty moving either shoulder due to pain and weakness; right lateral lower rib moderately tender, no crepitus  Ext:  No edema noted. Lymph: no cervical adenopathy appreciated  Skin:  Normal. and no rash or abnormalities   Psych: alert and oriented to person, place, time and situation and Speech: appropriate quality, quantity and organization of sentences     Advice/Referrals/Counseling (as indicated)   Education and counseling provided for any problems identified above: diet    Preventive Services     (Preventive care checklist to be included in patient instructions)  Discussed today Done Previously Not Needed     x  Pneumococcal vaccines    x  Flu vaccine     x Hepatitis B vaccine (if at risk)    x  Shingles vaccine   X after surgery   TDAP vaccine    x  Mammogram     x Pap smear   X after surgery   Colorectal cancer screening     x Low-dose CT for lung cancer screening   X reclast after surgery x  Bone density test    x  Glaucoma screening    x  Cholesterol test    x  Diabetes screening test      x Diabetes self-management class     x Nutritionist referral for diabetes or renal disease     Discussion of Advance Directive   Discussed with Mimi Randall her ability to prepare and advance directive in the case that an injury or illness causes her to be unable to make health care decisions. Would like to get one made with nurse navigator (planned to give her a folder today but forgot)    Assessment/Plan   Z00.00    ICD-10-CM ICD-9-CM    1. Medicare annual wellness visit, subsequent Z00.00 V70.0    2. MS (multiple sclerosis) ([facility] [address] G35 340    3. Neurogenic bladder N31.9 596.54    4. Chronic right shoulder pain M25.511 719.41     G89.29 338.29    5. Multiple joint pain M25.50 719.49 PAIN MGMT PANEL W/REFL, UR   6. Rib pain on right side R07.81 786.50 XR RIBS RT W PA CXR MIN 3 V   7. Essential hypertension I10 401.9    8. Localized osteoporosis without current pathological fracture M81.6 733.09    9.  Preop cardiovascular exam Z01.810 V72.81        Orders Placed This Encounter    XR RIBS RT W PA CXR MIN 3 V    PAIN MGMT PANEL W/REFL, UR    brinzolamide (AZOPT) 1 % ophthalmic suspension    gabapentin (NEURONTIN) 100 mg capsule    traMADol (ULTRAM) 50 mg tablet     MS stable, followed by neuro  Not usually falling but fall yesterday caused rib injury, did xray to rule out fracture but doubt fracture  Still planning to try reclast, will offer this fall as she is busy now with shoulder appointments and will be going to rehab for a while after surgery  Cleared for surgery, made note about caution with weaning catheter, very likely to get a worsened urinary retention secondary to anesthesia and meds  Refill tramadol for more pills, she really is having significant pain and naproxen not always enough. Risks reviewed,s he uses it sparingly. Routine UDS today. Prescription monitoring appropriate. Follow-up Disposition:  Return in about 3 months (around 10/15/2017).  will give ACP folder then    Van Marcelo MD

## 2017-07-15 NOTE — ACP (ADVANCE CARE PLANNING)
Discussed with Mimi Mercado her ability to prepare and advance directive in the case that an injury or illness causes her to be unable to make health care decisions.    Would like to get one made with nurse navigator

## 2017-07-19 LAB — SPECIMEN STATUS REPORT, ROLRST: NORMAL

## 2018-01-12 RX ORDER — SIMVASTATIN 40 MG/1
TABLET, FILM COATED ORAL
Qty: 90 TAB | Refills: 1 | Status: SHIPPED | OUTPATIENT
Start: 2018-01-12 | End: 2018-05-18 | Stop reason: SDUPTHER

## 2018-04-19 RX ORDER — NAPROXEN 500 MG/1
TABLET ORAL
Qty: 60 TAB | Refills: 0 | Status: SHIPPED | OUTPATIENT
Start: 2018-04-19 | End: 2018-05-14 | Stop reason: SDUPTHER

## 2018-04-25 ENCOUNTER — HOSPITAL ENCOUNTER (OUTPATIENT)
Dept: MAMMOGRAPHY | Age: 73
Discharge: HOME | End: 2018-04-25
Attending: FAMILY MEDICINE
Payer: MEDICARE

## 2018-04-25 DIAGNOSIS — Z12.39 BREAST SCREENING: ICD-10-CM

## 2018-04-25 PROCEDURE — 77067 SCR MAMMO BI INCL CAD: CPT

## 2018-05-14 NOTE — TELEPHONE ENCOUNTER
Refill    Requested Prescriptions     Pending Prescriptions Disp Refills    naproxen (NAPROSYN) 500 mg tablet 60 Tab 0

## 2018-05-15 RX ORDER — NAPROXEN 500 MG/1
TABLET ORAL
Qty: 60 TAB | Refills: 0 | Status: SHIPPED | OUTPATIENT
Start: 2018-05-15 | End: 2018-06-18 | Stop reason: SDUPTHER

## 2018-05-17 ENCOUNTER — OFFICE VISIT (OUTPATIENT)
Dept: FAMILY MEDICINE CLINIC | Age: 73
End: 2018-05-17

## 2018-05-17 VITALS
HEART RATE: 75 BPM | HEIGHT: 63 IN | DIASTOLIC BLOOD PRESSURE: 71 MMHG | BODY MASS INDEX: 37.74 KG/M2 | TEMPERATURE: 98.2 F | SYSTOLIC BLOOD PRESSURE: 134 MMHG | WEIGHT: 213 LBS | RESPIRATION RATE: 18 BRPM | OXYGEN SATURATION: 97 %

## 2018-05-17 DIAGNOSIS — Z12.11 ENCOUNTER FOR SCREENING FECAL OCCULT BLOOD TESTING: ICD-10-CM

## 2018-05-17 DIAGNOSIS — M25.50 MULTIPLE JOINT PAIN: ICD-10-CM

## 2018-05-17 DIAGNOSIS — E78.5 HYPERLIPIDEMIA, UNSPECIFIED HYPERLIPIDEMIA TYPE: ICD-10-CM

## 2018-05-17 DIAGNOSIS — E03.9 ACQUIRED HYPOTHYROIDISM: ICD-10-CM

## 2018-05-17 DIAGNOSIS — B02.9 HERPES ZOSTER WITHOUT COMPLICATION: Primary | ICD-10-CM

## 2018-05-17 DIAGNOSIS — E66.01 SEVERE OBESITY (BMI 35.0-39.9) WITH COMORBIDITY (HCC): ICD-10-CM

## 2018-05-17 DIAGNOSIS — R73.9 HYPERGLYCEMIA: ICD-10-CM

## 2018-05-17 DIAGNOSIS — G35 MS (MULTIPLE SCLEROSIS) (HCC): ICD-10-CM

## 2018-05-17 PROBLEM — E66.9 OBESITY (BMI 30-39.9): Status: RESOLVED | Noted: 2017-04-05 | Resolved: 2018-05-17

## 2018-05-17 PROBLEM — N32.81 OVERACTIVE DETRUSOR: Status: ACTIVE | Noted: 2018-05-17

## 2018-05-17 PROBLEM — N81.11 PROLAPSE OF VAGINAL WALL WITH MIDLINE CYSTOCELE: Status: ACTIVE | Noted: 2018-05-17

## 2018-05-17 RX ORDER — TRAMADOL HYDROCHLORIDE 50 MG/1
TABLET ORAL
Qty: 120 TAB | Refills: 2 | Status: SHIPPED | OUTPATIENT
Start: 2018-05-17 | End: 2018-10-02 | Stop reason: SDUPTHER

## 2018-05-17 NOTE — MR AVS SNAPSHOT
[facility] 
 
 
 [address] [place] [address] 
[phone] Patient: Nia Mcintyre MRN: [id number] ZJE:[id number] Visit Information Date & Time Provider Department Dept. Phone Encounter #  
 5/17/2018  9:45 AM Mp Chauhan  [facility] [phone] [id number] Follow-up Instructions Return in about 3 months (around 8/17/2018) for Annual Wellness Visit. Your Appointments 10/2/2018  1:00 PM  
Complete Physical with Mp Chauhan MD  
[facility]) Appt Note: CPE/No CP/dw [address] [address]  
[phone]  
  
   
 [address] [address] Upcoming Health Maintenance Date Due COLONOSCOPY 1/1/2017 DTaP/Tdap/Td series (2 - Tdap) 2/28/2017 GLAUCOMA SCREENING Q2Y 3/21/2018 MEDICARE YEARLY EXAM 7/16/2018 Influenza Age 5 to Adult 8/1/2018 BREAST CANCER SCRN MAMMOGRAM 4/25/2020 Allergies as of 5/17/2018  Review Complete On: 5/17/2018 By: Deanne Alexander Severity Noted Reaction Type Reactions Adhes. Band-tape-benzalkonium  09/19/2011    Rash Alphagan P [Brimonidine]  04/03/2017    Other (comments) Severe reaction and damage to the cornea Current Immunizations  Reviewed on 5/1/2018 Name Date DTAP Vaccine 2/28/2007 Influenza Nasal Vaccine 9/20/2015 Influenza Vaccine Split 10/1/2011 Pneumococcal Conjugate (PCV-13) 1/1/2010 Pneumococcal Vaccine (Pcv) 4/1/2010 Varicella Virus Vaccine Live 7/11/2008 ZZZ-RETIRED (DO NOT USE) Pneumococcal Vaccine (Unspecified Type) 1/1/2005 Not reviewed this visit You Were Diagnosed With   
  
 Codes Comments Herpes zoster without complication    -  Primary ICD-10-CM: B02.9 ICD-9-CM: 053.9 MS (multiple sclerosis) ([facility]     ICD-10-CM: G35 
ICD-9-CM: 780 Severe obesity (BMI 35.0-39. 9) with comorbidity ([facility]     ICD-10-CM: E66.01 
ICD-9-CM: 278.01 Acquired hypothyroidism     ICD-10-CM: E03.9 ICD-9-CM: 244.9 Hyperlipidemia, unspecified hyperlipidemia type     ICD-10-CM: E78.5 ICD-9-CM: 272.4 Hyperglycemia     ICD-10-CM: R73.9 ICD-9-CM: 790.29 Multiple joint pain     ICD-10-CM: M25.50 ICD-9-CM: 719.49 Vitals BP Pulse Temp Resp Height(growth percentile) Weight(growth percentile) 134/71 (BP 1 Location: Left arm, BP Patient Position: Sitting) 75 98.2 °F (36.8 °C) (Oral) 18 5' 3\" (1.6 m) 213 lb (96.6 kg) SpO2 BMI OB Status Smoking Status 97% 37.73 kg/m2 Postmenopausal Former Smoker BMI and BSA Data Body Mass Index Body Surface Area  
 37.73 kg/m 2 2.07 m 2 Preferred Pharmacy Pharmacy Name Phone [id number], [address] [phone] Your Updated Medication List  
  
   
This list is accurate as of 5/17/18 11:09 AM.  Always use your most recent med list.  
  
  
  
  
 ACIDOPHILUS EX STR (L. SPOROG) 35 million- 25 million cell Tab Generic drug:  acidophilus-sporogenes Take 1 Tab by mouth daily. aspirin delayed-release 81 mg tablet Take 81 mg by mouth daily. AZOPT 1 % ophthalmic suspension Generic drug:  brinzolamide Administer 1 Drop to both eyes two (2) times a day. bupivacaine-EPINEPHrine 0.25 %-1:200,000 Soln Commonly known as:  Mei Brett Used for mohs surgery buPROPion  mg tablet Commonly known as:  Nacho Lashawn Take 1 Tab by mouth every morning. dimethyl fumarate 240 mg Cpdr  
Take 240 mg by mouth two (2) times a day. DULoxetine 30 mg capsule Commonly known as:  CYMBALTA Take 30 mg by mouth daily. erythromycin ophthalmic ointment Commonly known as:  ILOTYCIN  
  
 levothyroxine 112 mcg tablet Commonly known as:  SYNTHROID  
 TAKE 1 TABLET BY MOUTH DAILY BEFORE BREAKFAST  
  
 loperamide 2 mg capsule Commonly known as:  IMODIUM Take  by mouth as needed. losartan-hydroCHLOROthiazide 100-25 mg per tablet Commonly known as:  HYZAAR Take 1 Tab by mouth daily. For blood pressure  
  
 naproxen 500 mg tablet Commonly known as:  NAPROSYN  
TAKE 1 TAB BY MOUTH TWO (2) TIMES DAILY AS NEEDED. NEEDS APPT  
  
 NEURONTIN 100 mg capsule Generic drug:  gabapentin Take 3 Caps by mouth nightly as needed (rarely uses for leg pain). RESTASIS 0.05 % ophthalmic emulsion Generic drug:  cycloSPORINE Administer 1 Drop to both eyes two (2) times a day. simvastatin 40 mg tablet Commonly known as:  ZOCOR  
TAKE 1 TABLET BY MOUTH NIGHTLY. therapeutic multivitamin tablet Commonly known as:  [facility] Take 1 Tab by mouth daily. traMADol 50 mg tablet Commonly known as:  ULTRAM  
TAKE 1 OR 2 TABLETS EVERY 4 HOURS AS NEEDED FOR PAIN (MAX IS 8 PER DAY) VITAMIN B-12 1,000 mcg tablet Generic drug:  cyanocobalamin Take 1,000 mcg by mouth daily. Prescriptions Printed Refills  
 traMADol (ULTRAM) 50 mg tablet 2 Sig: TAKE 1 OR 2 TABLETS EVERY 4 HOURS AS NEEDED FOR PAIN (MAX IS 8 PER DAY) Class: Print We Performed the Following CBC WITH AUTOMATED DIFF [30468 CPT(R)] [address] MONITORING [[id number] Custom] HEMOGLOBIN A1C WITH EAG [83910 CPT(R)] LIPID PANEL [16109 CPT(R)] METABOLIC PANEL, COMPREHENSIVE [98034 CPT(R)] TSH 3RD GENERATION [14343 CPT(R)] Follow-up Instructions Return in about 3 months (around 8/17/2018) for Annual Wellness Visit. Patient Instructions Shingles: Care Instructions Your Care Instructions Shingles (herpes zoster) causes pain and a blistered rash.  The rash can appear anywhere on the body but will be on only one side of the body, the left or right. It will be in a band, a strip, or a small area. The pain can be very severe. Shingles can also cause tingling or itching in the area of the rash. The blisters scab over after a few days and heal in 2 to 4 weeks. Medicines can help you feel better and may help prevent more serious problems caused by shingles. Shingles is caused by the same virus that causes chickenpox. When you have chickenpox, the virus gets into your nerve roots and stays there (becomes dormant) long after you get over the chickenpox. If the virus becomes active again, it can cause shingles. Follow-up care is a key part of your treatment and safety. Be sure to make and go to all appointments, and call your doctor if you are having problems. It's also a good idea to know your test results and keep a list of the medicines you take. How can you care for yourself at home? · Be safe with medicines. Take your medicines exactly as prescribed. Call your doctor if you think you are having a problem with your medicine. Antiviral medicine helps you get better faster. · Try not to scratch or pick at the blisters. They will crust over and fall off on their own if you leave them alone. · Put cool, wet cloths on the area to relieve pain and itching. You can also use calamine lotion. Try not to use so much lotion that it cakes and is hard to get off. · Put cornstarch or baking soda on the sores to help dry them out so they heal faster. · Do not use thick ointment, such as petroleum jelly, on the sores. This will keep them from drying and healing. · To help remove loose crusts, soak them in tap water. This can help decrease oozing, and dry and soothe the skin. · Take an over-the-counter pain medicine, such as acetaminophen (Tylenol), ibuprofen (Advil, Motrin), or naproxen (Aleve). Read and follow all instructions on the label. · Avoid close contact with people until the blisters have healed.  It is very important for you to avoid contact with anyone who has never had chickenpox or the chickenpox vaccine. Pregnant women, young babies, and anyone else who has a hard time fighting infection (such as someone with HIV, diabetes, or cancer) is especially at risk. When should you call for help? Call your doctor now or seek immediate medical care if: 
? · You have a new or higher fever. ? · You have a severe headache and a stiff neck. ? · You lose the ability to think clearly. ? · The rash spreads to your forehead, nose, eyes, or eyelids. ? · You have eye pain, or your vision gets worse. ? · You have new pain in your face, or you cannot move the muscles in your face. ? · Blisters spread to new parts of your body. ? Watch closely for changes in your health, and be sure to contact your doctor if: 
? · The rash has not healed after 2 to 4 weeks. ? · You still have pain after the rash has healed. Where can you learn more? Go to http://brittany-ramírez.info/. Sachi Napoles in the search box to learn more about \"Shingles: Care Instructions. \" Current as of: March 3, 2017 Content Version: 11.4 © 0195-9104 Attender. Care instructions adapted under license by [employer] (which disclaims liability or warranty for this information). If you have questions about a medical condition or this instruction, always ask your healthcare professional. [place] [address] any warranty or liability for your use of this information. Introducing [facility]! Tonya Chinchilla introduces 1spire patient portal. Now you can access parts of your medical record, email your doctor's office, and request medication refills online. 1. In your internet browser, go to [email or web address] [email or web address]. [email or web address]/E Ink 2. Click on the First Time User? Click Here link in the Sign In box. You will see the New Member Sign Up page. 3. Enter your SemiNex Access Code exactly as it appears below. You will not need to use this code after youve completed the sign-up process. If you do not sign up before the expiration date, you must request a new code. · SemiNex Access Code: [email or web address] Expires: 8/15/2018 11:09 AM 
 
4. Enter the last four digits of your Social Security Number (xxxx) and Date of Birth (mm/dd/yyyy) as indicated and click Submit. You will be taken to the next sign-up page. 5. Create a SemiNex ID. This will be your SemiNex login ID and cannot be changed, so think of one that is secure and easy to remember. 6. Create a SemiNex password. You can change your password at any time. 7. Enter your Password Reset Question and Answer. This can be used at a later time if you forget your password. 8. Enter your e-mail address. You will receive e-mail notification when new information is available in [address]. 9. Click Sign Up. You can now view and download portions of your medical record. 10. Click the Download Summary menu link to download a portable copy of your medical information. If you have questions, please visit the Frequently Asked Questions section of the SemiNex website. Remember, SemiNex is NOT to be used for urgent needs. For medical emergencies, dial 911. Now available from your iPhone and Android! Please provide this summary of care documentation to your next provider. Your primary care clinician is listed as Eva Chung. If you have any questions after today's visit, please call [phone].

## 2018-05-17 NOTE — PROGRESS NOTES
Chief Complaint   Patient presents with    Follow-up    Shingles     1. Have you been to the ER, urgent care clinic since your last visit? Hospitalized since your last visit? Yes Reason for visit: Carmen Longoria for panic attack    2. Have you seen or consulted any other health care providers outside of the [address] [place] [address] since your last visit? Include any pap smears or colon screening.  Yes Reason for visit: [facility] for rotator cuff surgery on Right side

## 2018-05-17 NOTE — PROGRESS NOTES
Lewis Hernández [address] [place], [address] [place] [address] Road  [phone]             Date of visit: 5/17/18   Subjective:      History obtained from:  the patient. Ang Grewal is a 75 y.o. female who presents today for shingles right back/flank started 1.5 weeks ago, taking valtrex from urgent care and helped rash but not pain which is 10/10. Out of tramadol they gave her. She tried cortisone cream. This is third time she had it. The tramadol did help some but ran out. Had some left form a long time ago. On med for MS per neuro. Not able to exercise  Does eat well, plenty of veggies  Weight is stable    Walda Larve about 1 month ago, every time she falls her daughter has to call rescue to help her get up      Had rotator cuff surgery last fall, did a lot of therapy afterward    Fasting for labs    Seeing neurologist tomorrow for follow up    Daughter has noticed changes to memory at times  Pt admits to taking benadryl at least 1 nightly  Continues to smoke a little MJ nightly for pain, sleep, works very well for her. Uses the tramadol prn joint pains but not very often, hasn't refilled in a long time    Patient Active Problem List    Diagnosis Date Noted    Severe obesity (BMI 35.0-39. 9) with comorbidity ([address] 05/17/2018    Prolapse of vaginal wall with midline cystocele 05/17/2018    Overactive detrusor 05/17/2018    Left-sided weakness 04/04/2017    Chronic edema 04/04/2017    CAD (coronary artery disease) 08/07/2012    Hyperlipidemia 08/07/2012    Hypothyroidism 08/07/2012    Normal cardiac stress test 08/07/2012    Lymphocytic colitis 08/07/2012    Renal stones 08/07/2012    Osteoarthritis 08/07/2012    Osteoporosis 08/07/2012    Hx of migraine headaches 08/07/2012    Psoriasis 08/07/2012    Glaucoma 08/07/2012    MS (multiple sclerosis) ([facility] 10/07/2011    Fall from slip, trip, or stumble 10/07/2011    Urinary retention 10/07/2011    Fall 10/07/2011    HTN (hypertension) 11/10/2010    Reflux 11/10/2010    Multiple joint pain 11/10/2010    Arthritis 11/10/2010    Urine, incontinence, stress female 11/10/2010     Current Outpatient Prescriptions   Medication Sig Dispense Refill    traMADol (ULTRAM) 50 mg tablet TAKE 1 OR 2 TABLETS EVERY 4 HOURS AS NEEDED FOR PAIN (MAX IS 8 PER DAY) 120 Tab 2    naproxen (NAPROSYN) 500 mg tablet TAKE 1 TAB BY MOUTH TWO (2) TIMES DAILY AS NEEDED. NEEDS APPT 60 Tab 0    buPROPion XL (WELLBUTRIN XL) 150 mg tablet Take 1 Tab by mouth every morning. 90 Tab 0    simvastatin (ZOCOR) 40 mg tablet TAKE 1 TABLET BY MOUTH NIGHTLY. 90 Tab 1    brinzolamide (AZOPT) 1 % ophthalmic suspension Administer 1 Drop to both eyes two (2) times a day.  gabapentin (NEURONTIN) 100 mg capsule Take 3 Caps by mouth nightly as needed (rarely uses for leg pain).  losartan-hydroCHLOROthiazide (HYZAAR) 100-25 mg per tablet Take 1 Tab by mouth daily. For blood pressure 90 Tab 1    bupivacaine-EPINEPHrine (MARCAINE-EPINEPHRINE) 0.25 %-1:200,000 soln Used for mohs surgery 1.5 mL 0    levothyroxine (SYNTHROID) 112 mcg tablet TAKE 1 TABLET BY MOUTH DAILY BEFORE BREAKFAST 90 Tab 4    dimethyl fumarate 240 mg cpDR Take 240 mg by mouth two (2) times a day.  erythromycin (ILOTYCIN) ophthalmic ointment   1    DULoxetine (CYMBALTA) 30 mg capsule Take 30 mg by mouth daily. 11    cycloSPORINE (RESTASIS) 0.05 % ophthalmic emulsion Administer 1 Drop to both eyes two (2) times a day.  therapeutic multivitamin (THERAGRAN) tablet Take 1 Tab by mouth daily.  acidophilus-sporogenes (ACIDOPHILUS EXTRA STRENGTH) 35 million- 25 million cell Tab Take 1 Tab by mouth daily.  aspirin delayed-release 81 mg tablet Take 81 mg by mouth daily.  loperamide (IMODIUM) 2 mg capsule Take  by mouth as needed.  cyanocobalamin (VITAMIN B-12) 1,000 mcg tablet Take 1,000 mcg by mouth daily. Allergies   Allergen Reactions    Adhes. Band-Tape-Benzalkonium Rash    Alphagan P [Brimonidine] Other (comments)     Severe reaction and damage to the cornea     Past Medical History:   Diagnosis Date    Arthritis     Asthma     had it while she smoked cigaretts    Autoimmune disease ([address] 2004    pt has MS    Burning with urination     CAD (coronary artery disease) 8/7/2012    Chronic pain     Depression     Dyspepsia and other specified disorders of function of stomach     GERD (gastroesophageal reflux disease)     Glaucoma     Glaucoma 8/7/2012    Hx of migraine headaches 8/7/2012    Hyperlipidemia 8/7/2012    Hypertension     Hypothyroidism 8/7/2012    IFG (impaired fasting glucose) 8/7/2012    Lymphocytic colitis 8/7/2012    Morbid obesity ([address]     Multiple sclerosis ([address]     Normal cardiac stress test 8/7/2012    Osteoarthritis 8/7/2012    Other ill-defined conditions(799.89)     COLITIS    Psoriasis 8/7/2012    Psychiatric disorder     anexity depression    Renal stones 8/7/2012    Sunburn, blistering     Thyroid disease      Past Surgical History:   Procedure Laterality Date    BREAST SURGERY PROCEDURE UNLISTED      cyst removal right breast    HX APPENDECTOMY      and lysis of adhesions    HX BREAST BIOPSY Right     many yrs ago; neg    HX CATARACT REMOVAL      both    HX CHOLECYSTECTOMY  1994    lap    HX KNEE REPLACEMENT      both    HX LYSIS OF ADHESIONS      HX MOHS PROCEDURES  04/19/2017    BCC left infraorbital cheek by Dr. Flaco Adan    HX TONSIL AND ADENOIDECTOMY       Family History   Problem Relation Age of Onset    COPD Mother     Lung Disease Mother     Breast Cancer Mother 43    Coronary Artery Disease Father     Bipolar Disorder Father     Hypertension Father     Heart Disease Sister      A fib    Coronary Artery Disease Maternal Grandmother     Coronary Artery Disease Maternal Grandfather      Social History   Substance Use Topics    Smoking status: Former Smoker     Quit date: 9/19/2006    Smokeless tobacco: Never Used    Alcohol use Yes      Comment: 1 DRINK/MO      Social History     Social History Narrative    Lives with daughter        Review of Systems  Card: denies chest pain  Pulm: denies shortness of breath      Objective:     Vitals:    05/17/18 1003   BP: 134/71   Pulse: 75   Resp: 18   Temp: 98.2 °F (36.8 °C)   TempSrc: Oral   SpO2: 97%   Weight: 213 lb (96.6 kg)   Height: 5' 3\" (1.6 m)     Body mass index is 37.73 kg/(m^2). General: stated age, obese and in NAD  Neck: supple, symmetrical, trachea midline, no adenopathy and thyroid: not enlarged, symmetric, no tenderness/mass/nodules  Lungs:  clear to auscultation w/o rales, rhonchi, wheezes w/normal effort and no use of accessory muscles of respiration   Heart: regular rate and rhythm, S1, S2 normal, no murmur, click, rub or gallop  Abdomen: soft, nontender, no masses  Ext:  No edema noted. Lymph: no cervical adenopathy appreciated  Skin:  Right flank posterior and anterior with very large clusters of crusted over vesicles and surrounding erythema, appears to be healing  Psych: alert and oriented to person, place, time and situation and Speech: appropriate quality, quantity and organization of sentences     Assessment/Plan:       ICD-10-CM ICD-9-CM    1. Herpes zoster without complication B16.8 068.1    2. MS (multiple sclerosis) ([facility]) G35 340 CBC WITH AUTOMATED DIFF      traMADol (ULTRAM) 50 mg tablet   3. Severe obesity (BMI 35.0-39. 9) with comorbidity ([address] E66.01 278.01    4. Acquired hypothyroidism E03.9 244.9 TSH 3RD GENERATION   5. Hyperlipidemia, unspecified hyperlipidemia type E78.5 272.4 LIPID PANEL      METABOLIC PANEL, COMPREHENSIVE   6. Hyperglycemia R73.9 790.29 HEMOGLOBIN A1C WITH EAG   7.  Multiple joint pain M25.50 719.49 COMPLIANCE DRUG SCREEN/PRESCRIPTION MONITORING        Orders Placed This Encounter    LIPID PANEL    METABOLIC PANEL, COMPREHENSIVE    CBC WITH AUTOMATED DIFF    HEMOGLOBIN A1C WITH EAG    TSH 3RD GENERATION    COMPLIANCE DRUG SCREEN/PRESCRIPTION MONITORING    traMADol (ULTRAM) 50 mg tablet       Shingles improving and already had valtrex course from urgent care  Advised to get shingrix after checking with MS doctor and after rash cleared. MS stable  Obesity stable; encouraged careful diet changes to lose weight. Check routine labs  Refill tramadol for prn use with joint pains, MS, also the zoster  Prescription monitoring appropriate. Contract signed  Due for UDS today  Advised I would not prescribe it again if she continues to use MJ, explained risks, state law  Other chronic problems stable    Discussed the diagnosis and plan and she expressed understanding. Follow-up Disposition:  Return in about 3 months (around 8/17/2018) for Annual Wellness Visit.     Floresita Barney MD

## 2018-05-17 NOTE — PATIENT INSTRUCTIONS
Shingles: Care Instructions  Your Care Instructions    Shingles (herpes zoster) causes pain and a blistered rash. The rash can appear anywhere on the body but will be on only one side of the body, the left or right. It will be in a band, a strip, or a small area. The pain can be very severe. Shingles can also cause tingling or itching in the area of the rash. The blisters scab over after a few days and heal in 2 to 4 weeks. Medicines can help you feel better and may help prevent more serious problems caused by shingles. Shingles is caused by the same virus that causes chickenpox. When you have chickenpox, the virus gets into your nerve roots and stays there (becomes dormant) long after you get over the chickenpox. If the virus becomes active again, it can cause shingles. Follow-up care is a key part of your treatment and safety. Be sure to make and go to all appointments, and call your doctor if you are having problems. It's also a good idea to know your test results and keep a list of the medicines you take. How can you care for yourself at home? · Be safe with medicines. Take your medicines exactly as prescribed. Call your doctor if you think you are having a problem with your medicine. Antiviral medicine helps you get better faster. · Try not to scratch or pick at the blisters. They will crust over and fall off on their own if you leave them alone. · Put cool, wet cloths on the area to relieve pain and itching. You can also use calamine lotion. Try not to use so much lotion that it cakes and is hard to get off. · Put cornstarch or baking soda on the sores to help dry them out so they heal faster. · Do not use thick ointment, such as petroleum jelly, on the sores. This will keep them from drying and healing. · To help remove loose crusts, soak them in tap water. This can help decrease oozing, and dry and soothe the skin.   · Take an over-the-counter pain medicine, such as acetaminophen (Tylenol), ibuprofen (Advil, Motrin), or naproxen (Aleve). Read and follow all instructions on the label. · Avoid close contact with people until the blisters have healed. It is very important for you to avoid contact with anyone who has never had chickenpox or the chickenpox vaccine. Pregnant women, young babies, and anyone else who has a hard time fighting infection (such as someone with HIV, diabetes, or cancer) is especially at risk. When should you call for help? Call your doctor now or seek immediate medical care if:  ? · You have a new or higher fever. ? · You have a severe headache and a stiff neck. ? · You lose the ability to think clearly. ? · The rash spreads to your forehead, nose, eyes, or eyelids. ? · You have eye pain, or your vision gets worse. ? · You have new pain in your face, or you cannot move the muscles in your face. ? · Blisters spread to new parts of your body. ? Watch closely for changes in your health, and be sure to contact your doctor if:  ? · The rash has not healed after 2 to 4 weeks. ? · You still have pain after the rash has healed. Where can you learn more? Go to http://brittany-ramírez.info/. Wing Session in the search box to learn more about \"Shingles: Care Instructions. \"  Current as of: March 3, 2017  Content Version: 11.4  © 8976-3942 [employer]. Care instructions adapted under license by [employer] (which disclaims liability or warranty for this information). If you have questions about a medical condition or this instruction, always ask your healthcare professional. [address] any warranty or liability for your use of this information.

## 2018-05-18 LAB
ALBUMIN SERPL-MCNC: 4.5 G/DL (ref 3.5–4.8)
ALBUMIN/GLOB SERPL: 2 {RATIO} (ref 1.2–2.2)
ALP SERPL-CCNC: 71 IU/L (ref 39–117)
ALT SERPL-CCNC: 28 IU/L (ref 0–32)
AST SERPL-CCNC: 23 IU/L (ref 0–40)
BASOPHILS # BLD AUTO: 0 X10E3/UL (ref 0–0.2)
BASOPHILS NFR BLD AUTO: 0 %
BILIRUB SERPL-MCNC: 0.3 MG/DL (ref 0–1.2)
BUN SERPL-MCNC: 22 MG/DL (ref 8–27)
BUN/CREAT SERPL: 32 (ref 12–28)
CALCIUM SERPL-MCNC: 9.7 MG/DL (ref 8.7–10.3)
CHLORIDE SERPL-SCNC: 100 MMOL/L (ref 96–106)
CHOLEST SERPL-MCNC: 150 MG/DL (ref 100–199)
CO2 SERPL-SCNC: 25 MMOL/L (ref 18–29)
CREAT SERPL-MCNC: 0.69 MG/DL (ref 0.57–1)
EOSINOPHIL # BLD AUTO: 0.3 X10E3/UL (ref 0–0.4)
EOSINOPHIL NFR BLD AUTO: 4 %
ERYTHROCYTE [DISTWIDTH] IN BLOOD BY AUTOMATED COUNT: 14.8 % (ref 12.3–15.4)
EST. AVERAGE GLUCOSE BLD GHB EST-MCNC: 100 MG/DL
GFR SERPLBLD CREATININE-BSD FMLA CKD-EPI: 86 ML/MIN/1.73
GFR SERPLBLD CREATININE-BSD FMLA CKD-EPI: 99 ML/MIN/1.73
GLOBULIN SER CALC-MCNC: 2.2 G/DL (ref 1.5–4.5)
GLUCOSE SERPL-MCNC: 101 MG/DL (ref 65–99)
HBA1C MFR BLD: 5.1 % (ref 4.8–5.6)
HCT VFR BLD AUTO: 44.6 % (ref 34–46.6)
HDLC SERPL-MCNC: 58 MG/DL
HGB BLD-MCNC: 14.7 G/DL (ref 11.1–15.9)
IMM GRANULOCYTES # BLD: 0 X10E3/UL (ref 0–0.1)
IMM GRANULOCYTES NFR BLD: 0 %
INTERPRETATION, 910389: NORMAL
LDLC SERPL CALC-MCNC: 69 MG/DL (ref 0–99)
LYMPHOCYTES # BLD AUTO: 0.6 X10E3/UL (ref 0.7–3.1)
LYMPHOCYTES NFR BLD AUTO: 10 %
MCH RBC QN AUTO: 29.6 PG (ref 26.6–33)
MCHC RBC AUTO-ENTMCNC: 33 G/DL (ref 31.5–35.7)
MCV RBC AUTO: 90 FL (ref 79–97)
MONOCYTES # BLD AUTO: 0.7 X10E3/UL (ref 0.1–0.9)
MONOCYTES NFR BLD AUTO: 11 %
NEUTROPHILS # BLD AUTO: 4.9 X10E3/UL (ref 1.4–7)
NEUTROPHILS NFR BLD AUTO: 75 %
PLATELET # BLD AUTO: 225 X10E3/UL (ref 150–379)
POTASSIUM SERPL-SCNC: 4.4 MMOL/L (ref 3.5–5.2)
PROT SERPL-MCNC: 6.7 G/DL (ref 6–8.5)
RBC # BLD AUTO: 4.96 X10E6/UL (ref 3.77–5.28)
SODIUM SERPL-SCNC: 142 MMOL/L (ref 134–144)
TRIGL SERPL-MCNC: 114 MG/DL (ref 0–149)
TSH SERPL DL<=0.005 MIU/L-ACNC: 1.45 UIU/ML (ref 0.45–4.5)
VLDLC SERPL CALC-MCNC: 23 MG/DL (ref 5–40)
WBC # BLD AUTO: 6.5 X10E3/UL (ref 3.4–10.8)

## 2018-05-18 RX ORDER — SIMVASTATIN 40 MG/1
TABLET, FILM COATED ORAL
Qty: 90 TAB | Refills: 1 | Status: SHIPPED | OUTPATIENT
Start: 2018-05-18 | End: 2019-01-13 | Stop reason: SDUPTHER

## 2018-05-18 RX ORDER — LEVOTHYROXINE SODIUM 112 UG/1
TABLET ORAL
Qty: 90 TAB | Refills: 3 | Status: SHIPPED | OUTPATIENT
Start: 2018-05-18 | End: 2019-06-15 | Stop reason: SDUPTHER

## 2018-05-18 RX ORDER — BUPROPION HYDROCHLORIDE 150 MG/1
150 TABLET ORAL
Qty: 90 TAB | Refills: 1 | Status: SHIPPED | OUTPATIENT
Start: 2018-05-18 | End: 2018-11-27 | Stop reason: SDUPTHER

## 2018-05-18 RX ORDER — LOSARTAN POTASSIUM AND HYDROCHLOROTHIAZIDE 25; 100 MG/1; MG/1
1 TABLET ORAL DAILY
Qty: 90 TAB | Refills: 1 | Status: SHIPPED | OUTPATIENT
Start: 2018-05-18 | End: 2018-10-22 | Stop reason: SDUPTHER

## 2018-05-18 NOTE — PROGRESS NOTES
Sent in letter:  Labs were great, including thyroid, 2 sugar tests, kidney, liver, electrolytes, and blood counts. I will send the thyroid refill now.

## 2018-05-23 LAB — DRUGS UR: NORMAL

## 2018-05-25 LAB — HEMOCCULT STL QL IA: NEGATIVE

## 2018-05-25 NOTE — PROGRESS NOTES
.lhssent  I am happy to report that your stool test was negative for blood. I hope you are feeling well, and please call if you have any questions.

## 2018-06-18 NOTE — TELEPHONE ENCOUNTER
Pharmacy on file verified  Requested Prescriptions     Pending Prescriptions Disp Refills    naproxen (NAPROSYN) 500 mg tablet 60 Tab 0     Sig: TAKE 1 TAB BY MOUTH TWO (2) TIMES DAILY AS NEEDED.  NEEDS APPT

## 2018-06-19 RX ORDER — NAPROXEN 500 MG/1
TABLET ORAL
Qty: 60 TAB | Refills: 0 | Status: SHIPPED | OUTPATIENT
Start: 2018-06-19 | End: 2018-07-26 | Stop reason: SDUPTHER

## 2018-07-26 RX ORDER — NAPROXEN 500 MG/1
TABLET ORAL
Qty: 60 TAB | Refills: 0 | Status: SHIPPED | OUTPATIENT
Start: 2018-07-26 | End: 2018-09-04 | Stop reason: SDUPTHER

## 2018-07-26 NOTE — TELEPHONE ENCOUNTER
----- Message from Minerva Marcelo sent at 7/25/2018  5:25 PM EDT -----  Regarding: Dr. Jonathan Rangel requesting a refill on prescription \"Naproxen 500mg\" [address] fax number [phone]. Pt best contact number is [phone]  . Requested Prescriptions     Pending Prescriptions Disp Refills    naproxen (NAPROSYN) 500 mg tablet 60 Tab 0     Sig: TAKE 1 TAB BY MOUTH TWO (2) TIMES DAILY AS NEEDED.  NEEDS APPT     Last refill 6/18/18

## 2018-07-26 NOTE — TELEPHONE ENCOUNTER
Last seen 5/17/18, I called pt to let her know that Dr Marifer Roth wanted to see her in 3 months. Pt states that she is down by the [facility], [facility] and won't be home until Oct.  Pt is scheduled 10/2. Pt said that she had let Dr Marifer Roth know that and that Dr Marifer Roth said it was fine.   Refill sent per verbal order from Dr Marifer Roth

## 2018-09-04 RX ORDER — NAPROXEN 500 MG/1
TABLET ORAL
Qty: 60 TAB | Refills: 1 | Status: SHIPPED | OUTPATIENT
Start: 2018-09-04 | End: 2022-04-08

## 2018-10-02 ENCOUNTER — OFFICE VISIT (OUTPATIENT)
Dept: FAMILY MEDICINE CLINIC | Age: 73
End: 2018-10-02

## 2018-10-02 VITALS
SYSTOLIC BLOOD PRESSURE: 128 MMHG | TEMPERATURE: 97.8 F | BODY MASS INDEX: 39.16 KG/M2 | DIASTOLIC BLOOD PRESSURE: 70 MMHG | HEART RATE: 74 BPM | HEIGHT: 63 IN | WEIGHT: 221 LBS | OXYGEN SATURATION: 97 % | RESPIRATION RATE: 18 BRPM

## 2018-10-02 DIAGNOSIS — M25.50 MULTIPLE JOINT PAIN: ICD-10-CM

## 2018-10-02 DIAGNOSIS — E66.01 SEVERE OBESITY (BMI 35.0-39.9) WITH COMORBIDITY (HCC): ICD-10-CM

## 2018-10-02 DIAGNOSIS — M19.90 ARTHRITIS: ICD-10-CM

## 2018-10-02 DIAGNOSIS — Z00.00 MEDICARE ANNUAL WELLNESS VISIT, SUBSEQUENT: Primary | ICD-10-CM

## 2018-10-02 DIAGNOSIS — R39.81 FUNCTIONAL INCONTINENCE: ICD-10-CM

## 2018-10-02 DIAGNOSIS — Z23 ENCOUNTER FOR IMMUNIZATION: ICD-10-CM

## 2018-10-02 DIAGNOSIS — I10 ESSENTIAL HYPERTENSION: ICD-10-CM

## 2018-10-02 DIAGNOSIS — G35 MS (MULTIPLE SCLEROSIS) (HCC): ICD-10-CM

## 2018-10-02 RX ORDER — TRAMADOL HYDROCHLORIDE 50 MG/1
TABLET ORAL
Qty: 120 TAB | Refills: 2 | Status: SHIPPED | OUTPATIENT
Start: 2018-10-02 | End: 2019-04-04

## 2018-10-02 RX ORDER — LATANOPROST 50 UG/ML
1 SOLUTION/ DROPS OPHTHALMIC DAILY
COMMUNITY

## 2018-10-02 RX ORDER — TIMOLOL MALEATE 5 MG/ML
SOLUTION/ DROPS OPHTHALMIC
Refills: 3 | COMMUNITY
Start: 2018-09-20

## 2018-10-02 NOTE — ACP (ADVANCE CARE PLANNING)
Discussed with Mimi Childs her ability to prepare and advance directive in the case that an injury or illness causes her to be unable to make health care decisions.    Would like to get one made with nurse navigator, gave her [employer]

## 2018-10-02 NOTE — PROGRESS NOTES
Chief Complaint   Patient presents with   [facility] Annual Wellness Visit       Reviewed Record in preparation for visit and have obtained necessary documentation. Identified pt with two pt identifiers (Name @ )    Health Maintenance Due   Topic    Shingrix Vaccine Age 50> (1 of 2)    DTaP/Tdap/Td series (2 - Tdap)    GLAUCOMA SCREENING Q2Y     MEDICARE YEARLY EXAM     Influenza Age 5 to Adult          1. Have you been to the ER, urgent care clinic since your last visit? Hospitalized since your last visit? no    2. Have you seen or consulted any other health care providers outside of the [address] [place] [address] since your last visit? Include any pap smears or colon screening.  no

## 2018-10-02 NOTE — PATIENT INSTRUCTIONS

## 2018-10-02 NOTE — MR AVS SNAPSHOT
Bk Patterson 
 
 
 [address] 
[phone] Patient: Delmy Lazcano MRN: [id number] MNU:[birth year] Visit Information Date & Time Provider Department Dept. Phone Encounter #  
 10/2/2018  1:00 PM Tia Taylor, [address] [id number] Follow-up Instructions Return in about 6 months (around 4/2/2019) for Follow up. Upcoming Health Maintenance Date Due Shingrix Vaccine Age 50> (1 of 2) 3/9/1994 DTaP/Tdap/Td series (2 - Tdap) 2/28/2017 GLAUCOMA SCREENING Q2Y 3/21/2018 MEDICARE YEARLY EXAM 7/16/2018 Influenza Age 5 to Adult 8/1/2018 FOBT Q 1 YEAR, 18+ 5/23/2019 BREAST CANCER SCRN MAMMOGRAM 4/25/2020 Allergies as of 10/2/2018  Review Complete On: 10/2/2018 By: Jone Chacko LPN Severity Noted Reaction Type Reactions Adhes. Band-tape-benzalkonium  09/19/2011    Rash Alphagan P [Brimonidine]  04/03/2017    Other (comments) Severe reaction and damage to the cornea Current Immunizations  Reviewed on 5/1/2018 Name Date DTAP Vaccine 2/28/2007 Influenza Nasal Vaccine 9/20/2015 Influenza Vaccine (Tri) Adjuvanted  Incomplete Influenza Vaccine Split 10/1/2011 Pneumococcal Conjugate (PCV-13) 1/1/2010 Pneumococcal Vaccine (Pcv) 4/1/2010 Varicella Virus Vaccine Live 7/11/2008 ZZZ-RETIRED (DO NOT USE) Pneumococcal Vaccine (Unspecified Type) 1/1/2005 Not reviewed this visit You Were Diagnosed With   
  
 Codes Comments Medicare annual wellness visit, subsequent    -  Primary ICD-10-CM: Z00.00 ICD-9-CM: V70.0 Essential hypertension     ICD-10-CM: I10 
ICD-9-CM: 401.9 MS (multiple sclerosis) ([facility]     ICD-10-CM: G35 
ICD-9-CM: 056 Severe obesity (BMI 35.0-39. 9) with comorbidity ([facility]     ICD-10-CM: E66.01 
ICD-9-CM: 278.01 Arthritis     ICD-10-CM: M19.90 ICD-9-CM: 716.90   
 Functional incontinence     ICD-10-CM: R39.81 ICD-9-CM: 788.91 Encounter for immunization     ICD-10-CM: J46 ICD-9-CM: V03.89   
 Multiple joint pain     ICD-10-CM: M25.50 ICD-9-CM: 719.49 Vitals BP Pulse Temp Resp Height(growth percentile) Weight(growth percentile) 128/70 74 97.8 °F (36.6 °C) (Oral) 18 5' 3\" (1.6 m) 221 lb (100.2 kg) SpO2 BMI OB Status Smoking Status 97% 39.15 kg/m2 Postmenopausal Former Smoker Vitals History BMI and BSA Data Body Mass Index Body Surface Area  
 39.15 kg/m 2 2.11 m 2 Preferred Pharmacy Pharmacy Name Phone [facility] #[id number]- [address] [phone] Your Updated Medication List  
  
   
This list is accurate as of 10/2/18  1:52 PM.  Always use your most recent med list.  
  
  
  
  
 ACIDOPHILUS EX STR (L. SPOROG) 35 million- 25 million cell Tab Generic drug:  acidophilus-sporogenes Take 1 Tab by mouth daily. aspirin delayed-release 81 mg tablet Take 81 mg by mouth daily. AZOPT 1 % ophthalmic suspension Generic drug:  brinzolamide Administer 1 Drop to both eyes two (2) times a day. bupivacaine-EPINEPHrine 0.25 %-1:200,000 Soln Commonly known as:  Khalida Barban Used for mohs surgery buPROPion  mg tablet Commonly known as:  Wallene Jose Take 1 Tab by mouth every morning. dimethyl fumarate 240 mg Cpdr  
Take 240 mg by mouth two (2) times a day. DULoxetine 30 mg capsule Commonly known as:  CYMBALTA Take 30 mg by mouth daily. erythromycin ophthalmic ointment Commonly known as:  ILOTYCIN  
  
 latanoprost 0.005 % ophthalmic solution Commonly known as:  XALATAN  
1 Drop.  
  
 levothyroxine 112 mcg tablet Commonly known as:  SYNTHROID  
TAKE 1 TABLET BY MOUTH DAILY BEFORE BREAKFAST  
  
 loperamide 2 mg capsule Commonly known as:  IMODIUM Take  by mouth as needed. losartan-hydroCHLOROthiazide 100-25 mg per tablet Commonly known as:  HYZAAR Take 1 Tab by mouth daily. For blood pressure  
  
 naproxen 500 mg tablet Commonly known as:  NAPROSYN  
TAKE ONE TABLET BY MOUTH TWICE DAILY AS NEEDED NEURONTIN 100 mg capsule Generic drug:  gabapentin Take 3 Caps by mouth nightly as needed (rarely uses for leg pain). OTHER Briefs and pads (uses both) for urinary incontinence, 3 of each per day, G35, prog fair, SHILOH 99m RESTASIS 0.05 % ophthalmic emulsion Generic drug:  cycloSPORINE Administer 1 Drop to both eyes two (2) times a day. simvastatin 40 mg tablet Commonly known as:  ZOCOR  
TAKE 1 TABLET BY MOUTH NIGHTLY. therapeutic multivitamin tablet Commonly known as:  [facility] Take 1 Tab by mouth daily. timolol 0.5 % ophthalmic solution Commonly known as:  TIMOPTIC INSTILL 1 DROP INTO INTO BOTH EYES TWICE A DAY  
  
 traMADol 50 mg tablet Commonly known as:  ULTRAM  
TAKE 1 OR 2 TABLETS EVERY 4 HOURS AS NEEDED FOR PAIN (MAX IS 8 PER DAY) VITAMIN B-12 1,000 mcg tablet Generic drug:  cyanocobalamin Take 1,000 mcg by mouth daily. Prescriptions Printed Refills OTHER 1 Sig: Briefs and pads (uses both) for urinary incontinence, 3 of each per day, G35, prog fair, SHILOH 99m Class: Print  
 traMADol (ULTRAM) 50 mg tablet 2 Sig: TAKE 1 OR 2 TABLETS EVERY 4 HOURS AS NEEDED FOR PAIN (MAX IS 8 PER DAY) Class: Print We Performed the Following ADMIN INFLUENZA VIRUS VAC [ [facility]] [address] MONITORING [[id number] Custom] INFLUENZA VACCINE INACTIVATED (IIV), SUBUNIT, ADJUVANTED, IM [id number] CPT(R)] METABOLIC PANEL, BASIC [95879 CPT(R)] Follow-up Instructions Return in about 6 months (around 4/2/2019) for Follow up. Patient Instructions Well Visit, Over 72: Care Instructions Your Care Instructions Physical exams can help you stay healthy. Your doctor has checked your overall health and may have suggested ways to take good care of yourself. He or she also may have recommended tests. At home, you can help prevent illness with healthy eating, regular exercise, and other steps. Follow-up care is a key part of your treatment and safety. Be sure to make and go to all appointments, and call your doctor if you are having problems. It's also a good idea to know your test results and keep a list of the medicines you take. How can you care for yourself at home? · Reach and stay at a healthy weight. This will lower your risk for many problems, such as obesity, diabetes, heart disease, and high blood pressure. · Get at least 30 minutes of exercise on most days of the week. Walking is a good choice. You also may want to do other activities, such as running, swimming, cycling, or playing tennis or team sports. · Do not smoke. Smoking can make health problems worse. If you need help quitting, talk to your doctor about stop-smoking programs and medicines. These can increase your chances of quitting for good. · Protect your skin from too much sun. When you're outdoors from 10 a.m. to 4 p.m., stay in the shade or cover up with clothing and a hat with a wide brim. Wear sunglasses that block UV rays. Even when it's cloudy, put broad-spectrum sunscreen (SPF 30 or higher) on any exposed skin. · See a dentist one or two times a year for checkups and to have your teeth cleaned. · Wear a seat belt in the car. · Limit alcohol to 2 drinks a day for men and 1 drink a day for women. Too much alcohol can cause health problems. Follow your doctor's advice about when to have certain tests. These tests can spot problems early. For men and women · Cholesterol. Your doctor will tell you how often to have this done based on your overall health and other things that can increase your risk for heart attack and stroke. · Blood pressure. Have your blood pressure checked during a routine doctor visit. Your doctor will tell you how often to check your blood pressure based on your age, your blood pressure results, and other factors. · Diabetes. Ask your doctor whether you should have tests for diabetes. · Vision. Experts recommend that you have yearly exams for glaucoma and other age-related eye problems. · Hearing. Tell your doctor if you notice any change in your hearing. You can have tests to find out how well you hear. · Colon cancer tests. Keep having colon cancer tests as your doctor recommends. You can have one of several types of tests. · Heart attack and stroke risk. At least every 4 to 6 years, you should have your risk for heart attack and stroke assessed. Your doctor uses factors such as your age, blood pressure, cholesterol, and whether you smoke or have diabetes to show what your risk for a heart attack or stroke is over the next 10 years. · Osteoporosis. Talk to your doctor about whether you should have a bone density test to find out whether you have thinning bones. Also ask your doctor about whether you should take calcium and vitamin D supplements. For women · Pap test and pelvic exam. You may no longer need a Pap test. Talk with your doctor about whether to stop or continue to have Pap tests. · Breast exam and mammogram. Ask how often you should have a mammogram, which is an X-ray of your breasts. A mammogram can spot breast cancer before it can be felt and when it is easiest to treat. · Thyroid disease. Talk to your doctor about whether to have your thyroid checked as part of a regular physical exam. Women have an increased chance of a thyroid problem. For men · Prostate exam. Talk to your doctor about whether you should have a blood test (called a PSA test) for prostate cancer.  Experts disagree on whether men should have this test. Some experts recommend that you discuss the benefits and risks of the test with your doctor. · Abdominal aortic aneurysm. Ask your doctor whether you should have a test to check for an aneurysm. You may need a test if you ever smoked or if your parent, brother, sister, or child has had an aneurysm. When should you call for help? Watch closely for changes in your health, and be sure to contact your doctor if you have any problems or symptoms that concern you. Where can you learn more? Go to http://brittany-ramírez.info/. Enter M236 in the search box to learn more about \"Well Visit, Over 65: Care Instructions. \" Current as of: May 16, 2017 Content Version: 11.7 © 3400-8422 [employer]). Care instructions adapted under license by [employer] (which disclaims liability or warranty for this information). If you have questions about a medical condition or this instruction, always ask your healthcare professional. [place] [address] any warranty or liability for your use of this information. Introducing [facility]! [employer] introduces [employer]ively patient portal. Now you can access parts of your medical record, email your doctor's office, and request medication refills online. 1. In your internet browser, go to [email or web address] 2. Click on the First Time User? Click Here link in the Sign In box. You will see the New Member Sign Up page. 3. Enter your Lively Access Code exactly as it appears below. You will not need to use this code after youve completed the sign-up process. If you do not sign up before the expiration date, you must request a new code. · Lively Access Code: [email or web address] Expires: 12/31/2018  1:01 PM 
 
4. Enter the last four digits of your Social Security Number (xxxx) and Date of Birth (mm/dd/yyyy) as indicated and click Submit. You will be taken to the next sign-up page. 5. Create a [employer] ID. This will be your [employer] login ID and cannot be changed, so think of one that is secure and easy to remember. 6. Create a [employer] password. You can change your password at any time. 7. Enter your Password Reset Question and Answer. This can be used at a later time if you forget your password. 8. Enter your e-mail address. You will receive e-mail notification when new information is available in [address] 9. Click Sign Up. You can now view and download portions of your medical record. 10. Click the Download Summary menu link to download a portable copy of your medical information. If you have questions, please visit the Frequently Asked Questions section of the [employer] website. Remember, [employer] is NOT to be used for urgent needs. For medical emergencies, dial 911. Now available from your iPhone and Android! Please provide this summary of care documentation to your next provider. Your primary care clinician is listed as Jose Tom. If you have any questions after today's visit, please call [phone].

## 2018-10-02 NOTE — LETTER
10/2/2018 1:27 PM 
 
RE:    [address] [facility] [address] Dear pharmacist, Thank you for agreeing to see [facility] I am writing to ask if she could be moved to the top of the waiting list for Shingrix? She had shingles 2x in the past 2 years due to her MS medication. Her neurologist has taken her off the [address] treatment until she can get vaccinated with Shingrix. I feel like her situation is much more urgent than most people needing the Shingrix so thought I would ask if she could be a top priority. Sincerely, Trevin Barrett MD

## 2018-10-02 NOTE — PROGRESS NOTES
Lewis Hernández [facility]. [place], [address]  [phone]           Date of visit: 10/2/18       This is an Subsequent Medicare Annual Wellness Visit (AWV), (Performed more than 12 months after effective date of Medicare Part B enrollment and 12 months after last preventive visit, Once in a lifetime)    I have reviewed the patient's medical history in detail and updated the computerized patient record. Paige Lanza is a 75 y.o. female   History obtained from: the patient. Concerns today   -says she doesn't have many complaints  -buying a recliner and keeping legs up has really helped the pressure in spine/tailbone and helped her legs not to be swollen/discolored. Doesn't sleep in it, just raises feet when seated. Really has helped.    -her neurologist took her off the MS med until she could get Shingrix but is on waiting list at 2 pharmacies. Tama better coming off the MS medication, \"like a weight lifted\"    -tries not to take the tramadol often, didn't take anything today  -was put on cymbalta when dx MS. Tried going off it and couldn't walk, lots of pain.  -later tried going off wellbutrin and cried a lot. So went back on it  Not eager to get off med, we discussed weaning wellbutrin in the spring    -has to wear briefs and pads for functional incontinence  Also history stress incontinence    -was in [place] for the summer, didn't have to go to the doctor for anything there. Hasn't been sick all summer    History     Patient Active Problem List   Diagnosis Code    HTN (hypertension) I10    Reflux K21.9    Multiple joint pain M25.50    Arthritis M19.90    Urine, incontinence, stress female N39.3    MS (multiple sclerosis) ([facility] [address] G35    Fall from slip, trip, or stumble W01. 0XXA    Urinary retention R33.9    Fall W19. Nimesh Haveastrid    CAD (coronary artery disease) I25.10    Hyperlipidemia E78.5    Hypothyroidism E03.9    Normal cardiac stress test [id number]  Lymphocytic colitis K52.832    Renal stones N20.0    Osteoarthritis M19.90    Osteoporosis M81.0    Hx of migraine headaches Z86.69    Psoriasis L40.9    Glaucoma H40.9    Left-sided weakness R53.1    Chronic edema R60.9    Severe obesity (BMI 35.0-39. 9) with comorbidity ([facility] [address] E66.01    Prolapse of vaginal wall with midline cystocele N81.11    Overactive detrusor N32.81    Functional incontinence R39.81     Past Medical History:   Diagnosis Date    Arthritis     Asthma     had it while she smoked cigaretts    Autoimmune disease ([address] 2004    pt has MS    Burning with urination     CAD (coronary artery disease) 8/7/2012    Chronic pain     Depression     Dyspepsia and other specified disorders of function of stomach     GERD (gastroesophageal reflux disease)     Glaucoma     Glaucoma 8/7/2012    Hx of migraine headaches 8/7/2012    Hyperlipidemia 8/7/2012    Hypertension     Hypothyroidism 8/7/2012    IFG (impaired fasting glucose) 8/7/2012    Lymphocytic colitis 8/7/2012    Morbid obesity ([facility] [address]     Multiple sclerosis ([facility] [address]     Normal cardiac stress test 8/7/2012    Osteoarthritis 8/7/2012    Other ill-defined conditions(799.89)     COLITIS    Psoriasis 8/7/2012    Psychiatric disorder     anexity depression    Renal stones 8/7/2012    Sunburn, blistering     Thyroid disease       Past Surgical History:   Procedure Laterality Date    BREAST SURGERY PROCEDURE UNLISTED      cyst removal right breast    HX APPENDECTOMY      and lysis of adhesions    HX BREAST BIOPSY Right     many yrs ago; neg    HX CATARACT REMOVAL      both    HX CHOLECYSTECTOMY  1994    lap    HX KNEE REPLACEMENT      both    HX LYSIS OF ADHESIONS      HX MOHS PROCEDURES  04/19/2017    BCC left infraorbital cheek by Dr. Odette Smallwood.  Band-Tape-Benzalkonium Rash    Alphagan P [Brimonidine] Other (comments)     Severe reaction and damage to the cornea     Current Outpatient Prescriptions   Medication Sig Dispense Refill    latanoprost (XALATAN) 0.005 % ophthalmic solution 1 Drop.  timolol (TIMOPTIC) 0.5 % ophthalmic solution INSTILL 1 DROP INTO INTO BOTH EYES TWICE A DAY  3    OTHER Briefs and pads (uses both) for urinary incontinence, 3 of each per day, G35, prog fair, SHILOH 99m 300 Each 1    traMADol (ULTRAM) 50 mg tablet TAKE 1 OR 2 TABLETS EVERY 4 HOURS AS NEEDED FOR PAIN (MAX IS 8 PER DAY) 120 Tab 2    naproxen (NAPROSYN) 500 mg tablet TAKE ONE TABLET BY MOUTH TWICE DAILY AS NEEDED 60 Tab 1    buPROPion XL (WELLBUTRIN XL) 150 mg tablet Take 1 Tab by mouth every morning. 90 Tab 1    levothyroxine (SYNTHROID) 112 mcg tablet TAKE 1 TABLET BY MOUTH DAILY BEFORE BREAKFAST 90 Tab 3    simvastatin (ZOCOR) 40 mg tablet TAKE 1 TABLET BY MOUTH NIGHTLY. 90 Tab 1    losartan-hydroCHLOROthiazide (HYZAAR) 100-25 mg per tablet Take 1 Tab by mouth daily. For blood pressure 90 Tab 1    brinzolamide (AZOPT) 1 % ophthalmic suspension Administer 1 Drop to both eyes two (2) times a day.  gabapentin (NEURONTIN) 100 mg capsule Take 3 Caps by mouth nightly as needed (rarely uses for leg pain).  dimethyl fumarate 240 mg cpDR Take 240 mg by mouth two (2) times a day.  erythromycin (ILOTYCIN) ophthalmic ointment   1    DULoxetine (CYMBALTA) 30 mg capsule Take 30 mg by mouth daily. 11    cycloSPORINE (RESTASIS) 0.05 % ophthalmic emulsion Administer 1 Drop to both eyes two (2) times a day.  therapeutic multivitamin (THERAGRAN) tablet Take 1 Tab by mouth daily.  acidophilus-sporogenes (ACIDOPHILUS EXTRA STRENGTH) 35 million- 25 million cell Tab Take 1 Tab by mouth daily.  aspirin delayed-release 81 mg tablet Take 81 mg by mouth daily.  loperamide (IMODIUM) 2 mg capsule Take  by mouth as needed.       bupivacaine-EPINEPHrine (MARCAINE-EPINEPHRINE) 0.25 %-1:200,000 soln Used for mohs surgery 1.5 mL 0    cyanocobalamin (VITAMIN B-12) 1,000 mcg tablet Take 1,000 mcg by mouth daily. Family History   Problem Relation Age of Onset   Jaja Ortiz COPD Mother    Jajaromina Ortiz Lung Disease Mother     Breast Cancer Mother 43    Coronary Artery Disease Father     Bipolar Disorder Father     Hypertension Father     Heart Disease Sister      A fib    Cancer Sister      pancreatic     Coronary Artery Disease Maternal Grandmother     Coronary Artery Disease Maternal Grandfather      Social History   Substance Use Topics    Smoking status: Former Smoker     Quit date: 9/19/2006    Smokeless tobacco: Never Used    Alcohol use Yes      Comment: 1 DRINK/MO       Specialists/Care Team   [employer]. Tiffany Mcmillan has established care with the following healthcare providers:  Patient Care Team:  Isaura Mei MD as PCP - General (Family Practice)  Michaela Connell MD (Cardiology)  Zach Sargent MD (Neurology)  Ara Gonzalez NP (Dermatology)  Margoth Neely MD (Ophthalmology)    Health Risk Assessment     Demographics   female  75 y.o. General Health Questions   -During the past 4 weeks:   -how would you rate your health in general? Fair   -how often have you been bothered by feeling dizzy when standing up? occasionally, when she first sits up, just has to wait a minute   -how much have you been bothered by bodily pain? moderately   -Have you noticed any hearing difficulties? no   -has your physical and emotional health limited your social activities with family or friends? Yes, very hard to get out    Emotional Health Questions   -Do you have a history of depression, anxiety, or emotional problems? Yes, depression in past, on wellbutrin  -Over the past 2 weeks, have you felt down, depressed or hopeless? No   -Over the past 2 weeks, have you felt little interest or pleasure in doing things?  No, enjoys her 2 granddaughters and her cat    Health Habits   Please describe your diet habits: grapes, blueberries, chicken vegetable soup, gained this summer \"eating too well\" with kids visiting and junk foods in house  Do you get 5 servings of fruits or vegetables daily? Not quite, keeps fruit in house, tries to keep raw veggies in house also  Do you exercise regularly? Not able, but she does get some exercise just getting around the house. Going to a gym would not really be option. Did PT and enjoyed it but didn't help much. Activities of Daily Living and Functional Status   -Do you need help with eating, walking, dressing, bathing, toileting, the phone, transportation, shopping, preparing meals, housework, laundry, medications or managing money? Mostly does things around house. Can't vacuum. Daughter does her grocery shopping. Has bedside commode she uses at night and sometimes during the day if she can't make it, due to MS.  -In the past four weeks, was someone available to help you if you needed and wanted help with anything? Yes, daughter  -Are you confident are you that you can control and manage most of your health problems? yes  -Have you been given information to help you keep track of your medications? yes  -How often do you have trouble taking your medications as prescribed? never    Fall Risk and Home Safety   Have you fallen 2 or more times in the past year? Not since spring  Does your home have rugs in the hallway, lack grab bars in the bathroom, lack handrails on the stairs or have poor lighting? No, has grab bars, thinks home is safe, has chair lift to go up steps  Do you have smoke detectors and check them regularly? yes  Do you have difficulties driving a car?  No, feels safe driving short distances  Do you always fasten your seat belt when you are in a car? yes    Review of Systems (if indicated for problems addressed today)   Card: denies chest pain  Pulm: denies shortness of breath    Physical Examination     Vitals:    10/02/18 1301 10/02/18 1350   BP: 151/71 128/70   Pulse: 74 Resp: 18    Temp: 97.8 °F (36.6 °C)    TempSrc: Oral    SpO2: 97%    Weight: 221 lb (100.2 kg)    Height: 5' 3\" (1.6 m)      Body mass index is 39.15 kg/(m^2). No exam data present  Was the patient's timed Up & Go test unsteady or longer than 30 seconds? yes    Evaluation of Cognitive Function   Mood/affect:  happy  Orientation: normal  Appearance: age appropriate  Family member/caregiver input: daughter thinks her memory is ok    Additional exam if indicated for problems addressed today:  General: stated age, obese and in NAD  Neck: supple, symmetrical, trachea midline, no adenopathy and thyroid: not enlarged, symmetric, no tenderness/mass/nodules  Lungs:  clear to auscultation w/o rales, rhonchi, wheezes w/normal effort and no use of accessory muscles of respiration   Heart: regular rate and rhythm, S1, S2 normal, no murmur, click, rub or gallop  Abdomen: soft, nontender, no masses  Ext:  No edema noted.    Lymph: no cervical adenopathy appreciated  Skin:  Normal. and no rash or abnormalities   Psych: alert and oriented to person, place, time and situation and Speech: appropriate quality, quantity and organization of sentences     Advice/Referrals/Counseling (as indicated)   Education and counseling provided for any problems identified above: diet    Preventive Services     Health Maintenance   Topic Date Due    Shingrix Vaccine Age 49> (1 of 2) 03/09/1994    DTaP/Tdap/Td series (2 - Tdap) 02/28/2017    GLAUCOMA SCREENING Q2Y  03/21/2018    FOBT Q 1 YEAR, 18+  05/23/2019    MEDICARE YEARLY EXAM  10/03/2019    BREAST CANCER SCRN MAMMOGRAM  04/25/2020    Bone Densitometry (Dexa) Screening  Completed    Pneumococcal 65+ Low/Medium Risk  Completed    Influenza Age 5 to Adult  Completed       (Preventive care checklist to be included in patient instructions)  Discussed today Done Previously Not Needed     x  Pneumococcal vaccines    x  Flu vaccine     x Hepatitis B vaccine (if at risk)   X Shingrix X zostavax  Shingles vaccine   X rx given   TDAP vaccine    x  Mammogram     x Pap smear    x  Colorectal cancer screening     x Low-dose CT for lung cancer screening   X reclast x  Bone density test    x  Glaucoma screening    x  Cholesterol test    x  Diabetes screening test      x Diabetes self-management class     x Nutritionist referral for diabetes or renal disease     Discussion of Advance Directive   Discussed with Mimi Figueroa her ability to prepare and advance directive in the case that an injury or illness causes her to be unable to make health care decisions. Would like to get one made with nurse navigator, gave her Honoring Choices Folder    Assessment/Plan   Z00.00    ICD-10-CM ICD-9-CM    1. Medicare annual wellness visit, subsequent Z00.00 V70.0    2. Essential hypertension F40 243.1 METABOLIC PANEL, BASIC   3. MS (multiple sclerosis) (HCC) G35 340 traMADol (ULTRAM) 50 mg tablet   4. Severe obesity (BMI 35.0-39. 9) with comorbidity ([facility] [address] E66.01 278.01    5. Arthritis M19.90 716.90    6. Functional incontinence R39.81 788.91    7. Encounter for immunization Z23 V03.89 INFLUENZA VACCINE INACTIVATED (IIV), SUBUNIT, ADJUVANTED, IM      ADMIN INFLUENZA VIRUS VAC   8.  Multiple joint pain M25.50 719.49 COMPLIANCE DRUG SCREEN/PRESCRIPTION MONITORING       Orders Placed This Encounter    Influenza Vaccine Inactivated (IIV)(FLUAD), Subunit, Adjuvanted, IM, (93964)    METABOLIC PANEL, BASIC    COMPLIANCE DRUG SCREEN/PRESCRIPTION MONITORING    Administration fee () for Medicare insured patients    latanoprost (XALATAN) 0.005 % ophthalmic solution    timolol (TIMOPTIC) 0.5 % ophthalmic solution    DISCONTD: varicella-zoster recombinant, PF, (SHINGRIX, PF,) 50 mcg/0.5 mL susr injection    DISCONTD: diphtheria-pertussis, acellular,-tetanus (BOOSTRIX TDAP) 2.5-8-5 Lf-mcg-Lf/0.5mL susp susp    OTHER    traMADol (ULTRAM) 50 mg tablet     MS stable, followed by neuro  Still planning to try reclast, will order now. Could not tolerate oral bisphosphonate in past  BP controlled  Refill tramadol for more pills, she really is having significant pain and naproxen not always enough. Risks reviewed,s he uses it sparingly. Routine UDS today. Prescription monitoring appropriate.        Follow-up Disposition:  Return in about 6 months (around 4/2/2019) for Follow up. will give ACP folder then    Kee Hernández MD

## 2018-10-03 LAB
BUN SERPL-MCNC: 19 MG/DL (ref 8–27)
BUN/CREAT SERPL: 29 (ref 12–28)
CALCIUM SERPL-MCNC: 9.7 MG/DL (ref 8.7–10.3)
CHLORIDE SERPL-SCNC: 104 MMOL/L (ref 96–106)
CO2 SERPL-SCNC: 21 MMOL/L (ref 20–29)
CREAT SERPL-MCNC: 0.66 MG/DL (ref 0.57–1)
GLUCOSE SERPL-MCNC: 96 MG/DL (ref 65–99)
POTASSIUM SERPL-SCNC: 5 MMOL/L (ref 3.5–5.2)
SODIUM SERPL-SCNC: 148 MMOL/L (ref 134–144)

## 2018-10-07 LAB — DRUGS UR: NORMAL

## 2018-10-10 RX ORDER — ZOLEDRONIC ACID 5 MG/100ML
5 INJECTION, SOLUTION INTRAVENOUS ONCE
Status: COMPLETED | OUTPATIENT
Start: 2018-10-15 | End: 2018-10-15

## 2018-10-13 DIAGNOSIS — F12.90 MARIJUANA USE: ICD-10-CM

## 2018-10-13 NOTE — PROGRESS NOTES
Sent in letter:  Due to state law, I will not be able to prescribe the tramadol or controlled medications, as you continue to have marijuana in your urine--I am sorry. I will do everything else I can to help with your pain though!

## 2018-10-15 ENCOUNTER — HOSPITAL ENCOUNTER (OUTPATIENT)
Dept: INFUSION THERAPY | Age: 73
Discharge: HOME | End: 2018-10-15
Payer: MEDICARE

## 2018-10-15 VITALS
DIASTOLIC BLOOD PRESSURE: 66 MMHG | TEMPERATURE: 96.9 F | BODY MASS INDEX: 39.33 KG/M2 | SYSTOLIC BLOOD PRESSURE: 137 MMHG | HEART RATE: 56 BPM | WEIGHT: 222 LBS

## 2018-10-15 PROCEDURE — 74011250636 HC RX REV CODE- 250/636: Performed by: FAMILY MEDICINE

## 2018-10-15 PROCEDURE — 96374 THER/PROPH/DIAG INJ IV PUSH: CPT

## 2018-10-15 RX ADMIN — ZOLEDRONIC ACID 5 MG: 5 INJECTION, SOLUTION INTRAVENOUS at 14:12

## 2018-10-15 NOTE — PROGRESS NOTES
[facility] Short Visit Note:    1400:  Pt arrived ambulatory and in no distress, received reclast 5  mg PIV in L arm and tolerated well. D/Cd from [facility] ambulatory and in no distress after 15 minute observation accompanied by daughter. PIV was removed per protocol. Patient is to follow up with MD for more appointments. Order written for once per year.       Visit Vitals    /66    Pulse (!) 56    Temp 96.9 °F (36.1 °C)    Wt 100.7 kg (222 lb)    BMI 39.33 kg/m2

## 2018-10-22 DIAGNOSIS — I10 HYPERTENSION, UNSPECIFIED TYPE: Primary | ICD-10-CM

## 2018-10-23 RX ORDER — LOSARTAN POTASSIUM AND HYDROCHLOROTHIAZIDE 25; 100 MG/1; MG/1
1 TABLET ORAL DAILY
Qty: 90 TAB | Refills: 1 | Status: SHIPPED | OUTPATIENT
Start: 2018-10-23 | End: 2019-05-05 | Stop reason: SDUPTHER

## 2018-11-09 ENCOUNTER — OFFICE VISIT (OUTPATIENT)
Dept: FAMILY MEDICINE CLINIC | Age: 73
End: 2018-11-09

## 2018-11-09 ENCOUNTER — TELEPHONE (OUTPATIENT)
Dept: FAMILY MEDICINE CLINIC | Age: 73
End: 2018-11-09

## 2018-11-09 VITALS
HEART RATE: 64 BPM | HEIGHT: 63 IN | RESPIRATION RATE: 18 BRPM | SYSTOLIC BLOOD PRESSURE: 122 MMHG | WEIGHT: 221 LBS | OXYGEN SATURATION: 97 % | DIASTOLIC BLOOD PRESSURE: 62 MMHG | TEMPERATURE: 98 F | BODY MASS INDEX: 39.16 KG/M2

## 2018-11-09 DIAGNOSIS — R19.7 ACUTE DIARRHEA: Primary | ICD-10-CM

## 2018-11-09 DIAGNOSIS — K51.90 ULCERATIVE COLITIS WITHOUT COMPLICATIONS, UNSPECIFIED LOCATION (HCC): ICD-10-CM

## 2018-11-09 RX ORDER — PREDNISONE 10 MG/1
TABLET ORAL
Qty: 44 TAB | Refills: 0 | Status: SHIPPED | OUTPATIENT
Start: 2018-11-09 | End: 2019-02-07 | Stop reason: ALTCHOICE

## 2018-11-09 NOTE — PROGRESS NOTES
Chief Complaint   Patient presents with    Inflammatory Bowel Disease     Colitis, taking Imodiium        Reviewed Record in preparation for visit and have obtained necessary documentation. Identified pt with two pt identifiers (Name @ )    Health Maintenance Due   Topic    Shingrix Vaccine Age 50> (1 of 2)    DTaP/Tdap/Td series (2 - Tdap)    GLAUCOMA SCREENING Q2Y          1. Have you been to the ER, urgent care clinic since your last visit? Hospitalized since your last visit? No    2. Have you seen or consulted any other health care providers outside of the [address] [place] [address] since your last visit? Include any pap smears or colon screening.  No

## 2018-11-09 NOTE — TELEPHONE ENCOUNTER
I feel terrible for her but am not comfortable calling in prednisone without verifying cause of the colitis. She might have an infection, and it could get worse if I gave her prednisone. Could she come in?  I will see her this evening if she can get here

## 2018-11-09 NOTE — TELEPHONE ENCOUNTER
Pt is calling in regards to having some issues with her colitis. She is requesting to have a call back from office with suggestion of steps she is needing to take to help with pain or to have medication (predinsone) called into pharmacy for her. She notes that she is having to leave out of town for am family emergency.      Best call back [phone]    Pharmacy on file verified

## 2018-11-09 NOTE — LETTER
11/17/2018 8:32 AM 
 
Ms. Pinky Roe [address] [facility] [id number] Dear Pinky Roe: 
 
Please find your most recent results below. Resulted Orders C DIFFICILE TOXIN A & B BY EIA Result Value Ref Range C. difficile Toxins A+B Negative Negative CULTURE, STOOL Result Value Ref Range Salmonella/Shigella Screen No Salmonella or Shigella recovered. Campylobacter Culture No Campylobacter species isolated. E coli Shiga toxin Negative Negative LACTOFERRIN, FECAL Result Value Ref Range LACTOFERRIN, FECAL, QUANT. 8.15 (H) 0.00 - 7.24 ug/mL(g) RECOMMENDATIONS: 
Final stool tests show some inflammation but no infection. As long as you are getting better, there is nothing else we need to do. If diarrhea continues, I could refer you to the GI doctor for a colonoscopy. Please call me if you have any questions: [phone] Sincerely, Elayne Friedman MD

## 2018-11-09 NOTE — PROGRESS NOTES
[facility]  [address] [place], [address]  [phone]             Date of visit: 11/9/2018   Subjective:      History obtained from:  Patient, daughter. Donita Garcia is a 75 y.o. female who presents today for colitis  Has been about 2 years ago since she had an episode but has had many episodes, the last one was in [facility]  Once was hospitalized for a week or so  Says she thinks she waited too long  Doesn't like to take pain medication  This has been 2 weeks, getting worse, around 10x diarrhea per day, when she voids some comes out  Hasn't seen any blood  Eating a little but not as much  No weight loss on our scale  Had chicken soup past few days  Last colonoscopy was approx 2011, was dx ulcerative colitis  Thinks she has had 3 episdoes total    Patient Active Problem List    Diagnosis Date Noted    Marijuana use 10/13/2018    Functional incontinence 10/02/2018    Severe obesity (BMI 35.0-39. 9) with comorbidity ([facility] 05/17/2018    Prolapse of vaginal wall with midline cystocele 05/17/2018    Overactive detrusor 05/17/2018    Left-sided weakness 04/04/2017    Chronic edema 04/04/2017    CAD (coronary artery disease) 08/07/2012    Hyperlipidemia 08/07/2012    Hypothyroidism 08/07/2012    Normal cardiac stress test 08/07/2012    Lymphocytic colitis 08/07/2012    Renal stones 08/07/2012    Osteoarthritis 08/07/2012    Osteoporosis 08/07/2012    Hx of migraine headaches 08/07/2012    Psoriasis 08/07/2012    Glaucoma 08/07/2012    MS (multiple sclerosis) ([facility] 10/07/2011    Fall from slip, trip, or stumble 10/07/2011    Urinary retention 10/07/2011    Fall 10/07/2011    HTN (hypertension) 11/10/2010    Reflux 11/10/2010    Multiple joint pain 11/10/2010    Arthritis 11/10/2010    Urine, incontinence, stress female 11/10/2010     Current Outpatient Medications   Medication Sig Dispense Refill    predniSONE (DELTASONE) 10 mg tablet 6 zypfo3l, then 5 sleag4t, then 4 dlsxv3t, then 3 nlhac4u, then 8teoys2b, then 7nlevi0l 44 Tab 0    losartan-hydroCHLOROthiazide (HYZAAR) 100-25 mg per tablet Take 1 Tab by mouth daily. For blood pressure 90 Tab 1    latanoprost (XALATAN) 0.005 % ophthalmic solution 1 Drop.  timolol (TIMOPTIC) 0.5 % ophthalmic solution INSTILL 1 DROP INTO INTO BOTH EYES TWICE A DAY  3    OTHER Briefs and pads (uses both) for urinary incontinence, 3 of each per day, G35, prog fair, SHILOH 99m 300 Each 1    traMADol (ULTRAM) 50 mg tablet TAKE 1 OR 2 TABLETS EVERY 4 HOURS AS NEEDED FOR PAIN (MAX IS 8 PER DAY) 120 Tab 2    naproxen (NAPROSYN) 500 mg tablet TAKE ONE TABLET BY MOUTH TWICE DAILY AS NEEDED 60 Tab 1    buPROPion XL (WELLBUTRIN XL) 150 mg tablet Take 1 Tab by mouth every morning. 90 Tab 1    levothyroxine (SYNTHROID) 112 mcg tablet TAKE 1 TABLET BY MOUTH DAILY BEFORE BREAKFAST 90 Tab 3    simvastatin (ZOCOR) 40 mg tablet TAKE 1 TABLET BY MOUTH NIGHTLY. 90 Tab 1    brinzolamide (AZOPT) 1 % ophthalmic suspension Administer 1 Drop to both eyes two (2) times a day.  gabapentin (NEURONTIN) 100 mg capsule Take 3 Caps by mouth nightly as needed (rarely uses for leg pain).  dimethyl fumarate 240 mg cpDR Take 240 mg by mouth two (2) times a day.  erythromycin (ILOTYCIN) ophthalmic ointment   1    DULoxetine (CYMBALTA) 30 mg capsule Take 30 mg by mouth daily. 11    cycloSPORINE (RESTASIS) 0.05 % ophthalmic emulsion Administer 1 Drop to both eyes two (2) times a day.  therapeutic multivitamin (THERAGRAN) tablet Take 1 Tab by mouth daily.  acidophilus-sporogenes (ACIDOPHILUS EXTRA STRENGTH) 35 million- 25 million cell Tab Take 1 Tab by mouth daily.  aspirin delayed-release 81 mg tablet Take 81 mg by mouth daily.  loperamide (IMODIUM) 2 mg capsule Take  by mouth as needed.  cyanocobalamin (VITAMIN B-12) 1,000 mcg tablet Take 1,000 mcg by mouth daily.       bupivacaine-EPINEPHrine (MARCAINE-EPINEPHRINE) 0.25 %-1:200,000 soln Used for mohs surgery 1.5 mL 0     Allergies   Allergen Reactions    Adhes.  Band-Tape-Benzalkonium Rash    Alphagan P [Brimonidine] Other (comments)     Severe reaction and damage to the cornea     Past Medical History:   Diagnosis Date    Arthritis     Asthma     had it while she smoked cigaretts    Autoimmune disease ([address] 2004    pt has MS    Burning with urination     CAD (coronary artery disease) 8/7/2012    Chronic pain     Depression     Dyspepsia and other specified disorders of function of stomach     GERD (gastroesophageal reflux disease)     Glaucoma     Glaucoma 8/7/2012    Hx of migraine headaches 8/7/2012    Hyperlipidemia 8/7/2012    Hypertension     Hypothyroidism 8/7/2012    IFG (impaired fasting glucose) 8/7/2012    Lymphocytic colitis 8/7/2012    Morbid obesity ([address]     Multiple sclerosis ([address]     Normal cardiac stress test 8/7/2012    Osteoarthritis 8/7/2012    Other ill-defined conditions(799.89)     COLITIS    Psoriasis 8/7/2012    Psychiatric disorder     anexity depression    Renal stones 8/7/2012    Sunburn, blistering     Thyroid disease      Past Surgical History:   Procedure Laterality Date    BREAST SURGERY PROCEDURE UNLISTED      cyst removal right breast    HX APPENDECTOMY      and lysis of adhesions    HX BREAST BIOPSY Right     many yrs ago; neg    HX CATARACT REMOVAL      both    HX CHOLECYSTECTOMY  1994    lap    HX KNEE REPLACEMENT      both    HX LYSIS OF ADHESIONS      HX MOHS PROCEDURES  04/19/2017    BCC left infraorbital cheek by Dr. Paula Colindres    HX TONSIL AND ADENOIDECTOMY       Family History   Problem Relation Age of Onset   Ase.Bravo COPD Mother     Lung Disease Mother     Breast Cancer Mother [address]    Coronary Artery Disease Father     Bipolar Disorder Father     Hypertension Father     Heart Disease Sister         A fib    Cancer Sister pancreatic     Coronary Artery Disease Maternal Grandmother     Coronary Artery Disease Maternal Grandfather      Social History     Tobacco Use    Smoking status: Former Smoker     Last attempt to quit: 2006     Years since quittin.1    Smokeless tobacco: Never Used   Substance Use Topics    Alcohol use: Yes     Comment: 1 DRINK/MO      Social History     Social History Narrative    Lives with daughter        Review of Systems  Gen: denies fever   GI: denies hematochezia        Objective:     Vitals:    18 1649   BP: 122/62   Pulse: 64   Resp: 18   Temp: 98 °F (36.7 °C)   TempSrc: Oral   SpO2: 97%   Weight: 221 lb (100.2 kg)   Height: 5' 3\" (1.6 m)     Body mass index is 39.15 kg/m². General: stated age, obese and in NAD  Neck: supple, symmetrical, trachea midline, no adenopathy and thyroid: not enlarged, symmetric, no tenderness/mass/nodules  Lungs:  clear to auscultation w/o rales, rhonchi, wheezes w/normal effort and no use of accessory muscles of respiration   Heart: regular rate and rhythm, S1, S2 normal, no murmur, click, rub or gallop  Abdomen: soft, nontender, no masses but exam limited by obesity  Ext:  No edema noted. Lymph: no cervical adenopathy appreciated  Skin:  Normal. and no rash or abnormalities   Psych: alert and oriented to person, place, time and situation and Speech: appropriate quality, quantity and organization of sentences      Assessment/Plan:       ICD-10-CM ICD-9-CM    1. Acute diarrhea R19.7 787.91 C DIFFICILE TOXIN A & B BY EIA      CULTURE, STOOL      LACTOFERRIN, FECAL   2.  Ulcerative colitis without complications, unspecified location ([facility] K51.90 556.9         Orders Placed This Encounter    C DIFFICILE TOXIN A & B BY EIA    CULTURE, STOOL    LACTOFERRIN, FECAL    predniSONE (DELTASONE) 10 mg tablet       Dx ulcerative colitis in past and prednisone helped  Will treat but will also check for infection  Reviewed worrisome signs or symptoms for which to call. Encouraged her to follow up with GI after this episode  She wants to put that off for a while as she just has so many medical problems and appointments limiting her    Discussed the diagnosis and plan and she expressed understanding. Follow-up Disposition:  Return if symptoms worsen or fail to improve.  or in April as planned    Latonia Tidwell MD

## 2018-11-09 NOTE — TELEPHONE ENCOUNTER
Called pt and she states that she is having a colitis flare. She has been having diarrhea off and on for 2 weeks but the last 5 days have been horrible. She is taking the imodium and she has already had 4 pill today. Pt said that her old PCP use to give her prednisone. Pt states that she is getting plenty of fluids. She has a sister we pancreatitic [place] that has taken a turn for the worse. She wants to leave tomorrow to go there.

## 2018-11-10 NOTE — PATIENT INSTRUCTIONS
Ulcerative Colitis: Care Instructions  Your Care Instructions    Ulcerative colitis is an inflammatory bowel disease (IBD). The large intestine (colon) gets inflamed and ulcers form in your colon. These ulcers can bleed. People have \"attacks\" of ulcerative colitis. Attacks can come and go. They can cause painful belly cramps and bloody diarrhea. This disease can affect part or all of the colon. How bad the disease gets will often depend on how much of the colon is affected. Bad attacks are often treated in a hospital. There you can get medicines, fluid, and nutrition through a tube in your vein, called an IV. This lets your digestive system rest and recover. If the medicines don't work well, surgery may be needed to remove the colon. At home, you can help control your ulcerative colitis. Take your medicines and try to eat well. And see your doctor as much as he or she recommends. Follow-up care is a key part of your treatment and safety. Be sure to make and go to all appointments, and call your doctor if you are having problems. It's also a good idea to know your test results and keep a list of the medicines you take. How can you care for yourself at home? · Be safe with medicines. Take your medicines exactly as prescribed. Call your doctor if you think you are having a problem with your medicine. You will get more details on the specific medicines your doctor prescribes. · Do not take anti-inflammatory medicines, such as aspirin, ibuprofen (Advil, Motrin), or naproxen (Aleve). They may make your symptoms worse. · Talk to your doctor before you take any other medicines or herbal products. · Eat healthy foods. Avoid foods that make your symptoms worse. These might include milk, alcohol, or spicy foods. · Make sure to get enough iron. Rectal bleeding may make you lose iron. Foods with a lot of iron include beef, lentils, spinach, and raisins. They also include iron-enriched breads and cereals.   · Take any nutrition supplements that your doctor prescribes. · This disease can affect all parts of your life. Get support from friends and family. You may also want to get some counseling. When should you call for help? Call 911 anytime you think you may need emergency care. For example, call if:    · Your stools are maroon or very bloody.     · You passed out (lost consciousness).    Call your doctor now or seek immediate medical care if:    · You are vomiting.     · You have new or worse belly pain.     · You have a fever.     · You cannot pass stools or gas.     · You have new or more blood in your stools.    Watch closely for changes in your health, and be sure to contact your doctor if:    · You have new or worse symptoms.     · You are losing weight.     · You do not get better as expected. Where can you learn more? Go to http://brittany-ramírez.info/. Enter Q016 in the search box to learn more about \"Ulcerative Colitis: Care Instructions. \"  Current as of: March 28, 2018  Content Version: 11.8  © 2288-4902 [employer]. Care instructions adapted under license by [employer] (which disclaims liability or warranty for this information). If you have questions about a medical condition or this instruction, always ask your healthcare professional. [address] any warranty or liability for your use of this information.

## 2018-11-14 LAB — C DIFF TOX A+B STL QL IA: NEGATIVE

## 2018-11-15 LAB — LACTOFERRIN STL-MCNC: 8.15 UG/ML(G) (ref 0–7.24)

## 2018-11-16 LAB
CAMPYLOBACTER STL CULT: NORMAL
E COLI SXT STL QL IA: NEGATIVE
SALM + SHIG STL CULT: NORMAL

## 2018-11-17 NOTE — PROGRESS NOTES
Sent in letter:  Final stool tests show some inflammation but no infection. As long as you are getting better, there is nothing else we need to do. If diarrhea continues, I could refer you to the GI doctor for a colonoscopy.

## 2018-11-27 NOTE — TELEPHONE ENCOUNTER
Pharmacy is requesting a refill for 90 day supply  . Requested Prescriptions     Pending Prescriptions Disp Refills    buPROPion XL (WELLBUTRIN XL) 150 mg tablet 90 Tab 1     Sig: Take 1 Tab by mouth every morning.      LOV: November 09, 2018  Last refill : 5/18/2018  Pharmacy verified

## 2018-11-29 RX ORDER — BUPROPION HYDROCHLORIDE 150 MG/1
150 TABLET ORAL
Qty: 90 TAB | Refills: 1 | Status: SHIPPED | OUTPATIENT
Start: 2018-11-29 | End: 2019-05-24 | Stop reason: SDUPTHER

## 2019-01-14 RX ORDER — SIMVASTATIN 40 MG/1
TABLET, FILM COATED ORAL
Qty: 90 TAB | Refills: 0 | Status: SHIPPED | OUTPATIENT
Start: 2019-01-14 | End: 2019-04-15 | Stop reason: SDUPTHER

## 2019-02-07 ENCOUNTER — OFFICE VISIT (OUTPATIENT)
Dept: FAMILY MEDICINE CLINIC | Age: 74
End: 2019-02-07

## 2019-02-07 VITALS
TEMPERATURE: 98 F | HEART RATE: 63 BPM | BODY MASS INDEX: 39.15 KG/M2 | DIASTOLIC BLOOD PRESSURE: 80 MMHG | HEIGHT: 63 IN | RESPIRATION RATE: 18 BRPM | OXYGEN SATURATION: 97 % | SYSTOLIC BLOOD PRESSURE: 130 MMHG

## 2019-02-07 DIAGNOSIS — L60.8 DEFORMITY OF TOENAIL: Primary | ICD-10-CM

## 2019-02-07 NOTE — PROGRESS NOTES
Chief Complaint   Patient presents with    Foot Pain     the toe next to the pinky toe is black (left foot) - noticed about 3 days ago       1. Have you been to the ER, urgent care clinic since your last visit? Hospitalized since your last visit? No    2. Have you seen or consulted any other health care providers outside of the [address] [place] [address] since your last visit? Include any pap smears or colon screening.  No

## 2019-02-07 NOTE — PATIENT INSTRUCTIONS
Learning About Foot and Toenail Care  Foot and toenail care: Overview  Checking your loved one's feet and keeping them clean and soft can help prevent cracks and infection in the skin. This is especially important for people who have diabetes. Keeping toenails trimmed--and polished if that's what the person likes--also helps the person feel well-groomed. If the person you care for has diabetes or has foot problems, such as bad bunions and corns, think about taking them to see a podiatrist. This is a doctor who specializes in the care of the feet. Sometimes a podiatrist will come to the home if the person can't go out for visits. Try to take the person for salon pedicures if that is what they want. It's a chance to get out and see people and continue a favorite activity. You can do basic nail care at home. Usually all you need to do is keep the nails clean and at a safe length. How do you trim someone's toenails? Try to trim the person's nails every week. Or check the nails each week to see if they need to be trimmed. It's easiest to trim nails after the person has had a shower or foot bath. It makes the nails softer and easier to trim. Start by gathering your supplies. You will need toenail clippers and a nail file. You may also need nail polish and nail polish remover. To trim the nails:  1. Wash and dry your hands. You don't need to wear gloves. 2. Use nail polish remover to take off any polish. 3. Hold the person's foot and toe steady with one hand while you trim the nail with your other hand. Trim the nails straight across. Leave the nails a little longer at the corners so that the sharp ends don't cut into the skin. 4. Keep the nails no longer than the tip of the toes. 5. Let the nails dry if they are still damp and soft. 6. Use a nail file to gently smooth the edges of the nails, especially at the corners. They may be sharp after the nails are cut straight.   7. Apply nail polish, if the person wants it. If the person's nails are thick and discolored, it may be safest to have a podiatrist cut them. What else do you need to know? When you're caring for someone's nails, it is important to remember not to trim or cut the cuticles. A minor cut in a cuticle could lead to an infection. Wash the feet daily in the shower or bath or in a basin made for washing feet. It's extra important to wash the feet carefully if the person has diabetes. After washing the feet, dry gently. Put lotion on the feet, especially on the heels. But don't put it between the toes. If the person doesn't have diabetes and you see signs of athlete's foot (such as dry, cracking, or itchy skin between the toes), you can try an over-the-counter medicine. These medicines can kill the fungus that causes athlete's foot. If the problem doesn't go away, talk to the person's doctor. Look every day for cuts or signs of infection, such as pain, swelling, redness, or warmth. If you see any of these signs--especially in someone who has diabetes--call the doctor. Where can you learn more? Go to http://brittany-ramírez.info/. Enter V379 in the search box to learn more about \"Learning About Foot and Toenail Care. \"  Current as of: April 18, 2018  Content Version: 11.9  © 0644-9667 [employer]. Care instructions adapted under license by [employer] (which disclaims liability or warranty for this information). If you have questions about a medical condition or this instruction, always ask your healthcare professional. [place] [address] any warranty or liability for your use of this information.

## 2019-04-04 ENCOUNTER — OFFICE VISIT (OUTPATIENT)
Dept: FAMILY MEDICINE CLINIC | Age: 74
End: 2019-04-04

## 2019-04-04 VITALS
TEMPERATURE: 97.4 F | OXYGEN SATURATION: 97 % | HEART RATE: 72 BPM | DIASTOLIC BLOOD PRESSURE: 75 MMHG | WEIGHT: 224.6 LBS | SYSTOLIC BLOOD PRESSURE: 133 MMHG | HEIGHT: 63 IN | BODY MASS INDEX: 39.8 KG/M2 | RESPIRATION RATE: 19 BRPM

## 2019-04-04 DIAGNOSIS — E66.01 SEVERE OBESITY (BMI 35.0-39.9) WITH COMORBIDITY (HCC): ICD-10-CM

## 2019-04-04 DIAGNOSIS — E03.9 ACQUIRED HYPOTHYROIDISM: ICD-10-CM

## 2019-04-04 DIAGNOSIS — G35 MS (MULTIPLE SCLEROSIS) (HCC): ICD-10-CM

## 2019-04-04 DIAGNOSIS — F33.0 MILD EPISODE OF RECURRENT MAJOR DEPRESSIVE DISORDER (HCC): ICD-10-CM

## 2019-04-04 DIAGNOSIS — E78.5 HYPERLIPIDEMIA, UNSPECIFIED HYPERLIPIDEMIA TYPE: ICD-10-CM

## 2019-04-04 DIAGNOSIS — I10 HYPERTENSION, UNSPECIFIED TYPE: Primary | ICD-10-CM

## 2019-04-04 NOTE — PATIENT INSTRUCTIONS

## 2019-04-04 NOTE — PROGRESS NOTES
Chief Complaint   Patient presents with    Hypertension     follow up      1. Have you been to the ER, urgent care clinic since your last visit? Hospitalized since your last visit? No    2. Have you seen or consulted any other health care providers outside of the [address] [place] [address] since your last visit? Include any pap smears or colon screening.  No

## 2019-04-04 NOTE — PROGRESS NOTES
[facility]  [place]. [place], [address]  [phone]             Date of visit: 4/4/2019   Subjective:      History obtained from:  Patient, daughter. Kipp Schaumann is a 75 y.o. female who presents today for follow up chronic problems    Pt says doing ok, daughter concerned about her. Doesn't have the energy    Just doesn't feel like doing things  Not tired exactly    Daughter says the one [place] that gets [place]  Has neighbors who visit her there. Tat is a happy time    Has fallen 4x since [place]  If she swings her head around loses balance  Tries to remember to do it slowly, otherwise gets vertigo  Last time she did PT they tried to adjust her ears but didn't work all that well    Daughter notes that 2 of the falls were after she took a benzo before an MRI  Notes first fall wasn't traumatic but second one was    They do think she is more weak, especially was for 2 weeks and hasn't regained all her strength  Will see neuro in May, goes every 6 mo    Still takes naproxen, needs it for pain  Not taking the tramadol    Has to keep room dim  Light hurts eyes  Can't read any more  Enjoys lisa    Did get her first shingles shot, on list for 2nd one  Doesn't need more pneumonia shots    Patient Active Problem List    Diagnosis Date Noted    Mild episode of recurrent major depressive disorder ([facility] 04/04/2019    Marijuana use 10/13/2018    Functional incontinence 10/02/2018    Severe obesity (BMI 35.0-39. 9) with comorbidity ([address] 05/17/2018    Prolapse of vaginal wall with midline cystocele 05/17/2018    Overactive detrusor 05/17/2018    Left-sided weakness 04/04/2017    Chronic edema 04/04/2017    CAD (coronary artery disease) 08/07/2012    Hyperlipidemia 08/07/2012    Hypothyroidism 08/07/2012    Normal cardiac stress test 08/07/2012    Lymphocytic colitis 08/07/2012    Renal stones 08/07/2012    Osteoarthritis 08/07/2012    Osteoporosis 08/07/2012    Hx of migraine headaches 08/07/2012    Psoriasis 08/07/2012    Glaucoma 08/07/2012    [facility] sclerosis) [facility] 10/07/2011    Fall from slip, trip, or stumble 10/07/2011    Urinary retention 10/07/2011    Fall 10/07/2011    HTN (hypertension) 11/10/2010    Reflux 11/10/2010    Multiple joint pain 11/10/2010    Arthritis 11/10/2010    Urine, incontinence, stress female 11/10/2010     Current Outpatient Medications   Medication Sig Dispense Refill    diphtheria-pertussis, acellular,-tetanus (BOOSTRIX TDAP) 2.5-8-5 Lf-mcg-Lf/0.5mL susp susp 0.5 mL by IntraMUSCular route once for 1 dose. 0.5 mL 0    simvastatin (ZOCOR) 40 mg tablet TAKE 1 TABLET EVERY EVENING 90 Tab 0    buPROPion XL (WELLBUTRIN XL) 150 mg tablet Take 1 Tab by mouth every morning. 90 Tab 1    losartan-hydroCHLOROthiazide (HYZAAR) 100-25 mg per tablet Take 1 Tab by mouth daily. For blood pressure 90 Tab 1    latanoprost (XALATAN) 0.005 % ophthalmic solution Administer 1 Drop to both eyes daily.  timolol (TIMOPTIC) 0.5 % ophthalmic solution INSTILL 1 DROP INTO INTO BOTH EYES TWICE A DAY  3    naproxen (NAPROSYN) 500 mg tablet TAKE ONE TABLET BY MOUTH TWICE DAILY AS NEEDED 60 Tab 1    levothyroxine (SYNTHROID) 112 mcg tablet TAKE 1 TABLET BY MOUTH DAILY BEFORE BREAKFAST 90 Tab 3    brinzolamide (AZOPT) 1 % ophthalmic suspension Administer 1 Drop to both eyes two (2) times a day.  gabapentin (NEURONTIN) 100 mg capsule Take 3 Caps by mouth nightly as needed (rarely uses for leg pain).  erythromycin (ILOTYCIN) ophthalmic ointment   1    DULoxetine (CYMBALTA) 30 mg capsule Take 30 mg by mouth daily. 11    cycloSPORINE (RESTASIS) 0.05 % ophthalmic emulsion Administer 1 Drop to both eyes two (2) times a day.  therapeutic multivitamin (THERAGRAN) tablet Take 1 Tab by mouth daily.       acidophilus-sporogenes (ACIDOPHILUS EXTRA STRENGTH) 35 million- 25 million cell Tab Take 1 Tab by mouth daily.  aspirin delayed-release 81 mg tablet Take 81 mg by mouth daily.  loperamide (IMODIUM) 2 mg capsule Take  by mouth as needed.  cyanocobalamin (VITAMIN B-12) 1,000 mcg tablet Take 1,000 mcg by mouth daily. Allergies   Allergen Reactions    Adhes.  Band-Tape-Benzalkonium Rash    Alphagan P [Brimonidine] Other (comments)     Severe reaction and damage to the cornea     Past Medical History:   Diagnosis Date    Arthritis     Asthma     had it while she smoked cigaretts    Autoimmune disease ([address] 2004    pt has MS    Burning with urination     CAD (coronary artery disease) 8/7/2012    Chronic pain     Depression     Dyspepsia and other specified disorders of function of stomach     GERD (gastroesophageal reflux disease)     Glaucoma     Glaucoma 8/7/2012    Hx of migraine headaches 8/7/2012    Hyperlipidemia 8/7/2012    Hypertension     Hypothyroidism 8/7/2012    IFG (impaired fasting glucose) 8/7/2012    Lymphocytic colitis 8/7/2012    Morbid obesity ([address]     Multiple sclerosis ([facility]     Normal cardiac stress test 8/7/2012    Osteoarthritis 8/7/2012    Other ill-defined conditions(799.89)     COLITIS    Psoriasis 8/7/2012    Psychiatric disorder     anexity depression    Renal stones 8/7/2012    Sunburn, blistering     Thyroid disease      Past Surgical History:   Procedure Laterality Date    BREAST SURGERY PROCEDURE UNLISTED      cyst removal right breast    HX APPENDECTOMY      and lysis of adhesions    HX BREAST BIOPSY Right     many yrs ago; neg    HX CATARACT REMOVAL      both    HX CHOLECYSTECTOMY  1994    lap    HX KNEE REPLACEMENT      both    HX LYSIS OF ADHESIONS      HX MOHS PROCEDURES  04/19/2017    BCC left infraorbital cheek by Dr. Erika Sheppard    HX TONSIL AND ADENOIDECTOMY       Family History   Problem Relation Age of Onset    COPD Mother  Lung Disease Mother     Breast Cancer Mother 43    Coronary Artery Disease Father     Bipolar Disorder Father     Hypertension Father     Heart Disease Sister         A fib    Cancer Sister         pancreatic     Coronary Artery Disease Maternal Grandmother     Coronary Artery Disease Maternal Grandfather      Social History     Tobacco Use    Smoking status: Former Smoker     Last attempt to quit: 2006     Years since quittin.5    Smokeless tobacco: Never Used   Substance Use Topics    Alcohol use: Yes     Comment: 1 DRINK/MO      Social History     Social History Narrative    Lives with daughter        Review of Systems  Card: denies chest pain  Psych: denies feeling depressed, at least not more than normal for her, denies SI  All: admits to allergies right now, has to stay inside this time of year  Pulm: denies shortness of breath      Objective:     Vitals:    19 1031   BP: 133/75   Pulse: 72   Resp: 19   Temp: 97.4 °F (36.3 °C)   TempSrc: Oral   SpO2: 97%   Weight: 224 lb 9.6 oz (101.9 kg)   Height: 5' 3\" (1.6 m)     Body mass index is 39.79 kg/m². General: stated age, obese and in NAD  Neck: supple, symmetrical, trachea midline, no adenopathy and thyroid: not enlarged, symmetric, no tenderness/mass/nodules  Lungs:  clear to auscultation w/o rales, rhonchi, wheezes w/normal effort and no use of accessory muscles of respiration   Heart: regular rate and rhythm, S1, S2 normal, no murmur, click, rub or gallop  Abdomen: soft, nontender, no masses but exam limited by obesity  Ext:  No edema noted. Neuro: walks with walker  Lymph: no cervical adenopathy appreciated  Skin:  Normal. and no rash or abnormalities   Psych: alert and oriented to person, place, time and situation and Speech: appropriate quality, quantity and organization of sentences      Assessment/Plan:       ICD-10-CM ICD-9-CM    1.  Hypertension, unspecified type I10 401.9 CBC WITH AUTOMATED DIFF      METABOLIC PANEL, COMPREHENSIVE      LIPID PANEL   2. MS (multiple sclerosis) ([facility] G35 340    3. Severe obesity (BMI 35.0-39. 9) with comorbidity ([facility] E66.01 278.01    4. Acquired hypothyroidism E03.9 244.9 TSH 3RD GENERATION   5. Hyperlipidemia, unspecified hyperlipidemia type G98.4 677.1 METABOLIC PANEL, COMPREHENSIVE      LIPID PANEL   6. Mild episode of recurrent major depressive disorder (HCC) F33.0 296.31         Orders Placed This Encounter    CBC WITH AUTOMATED DIFF    METABOLIC PANEL, COMPREHENSIVE    LIPID PANEL    TSH 3RD GENERATION    diphtheria-pertussis, acellular,-tetanus (BOOSTRIX TDAP) 2.5-8-5 Lf-mcg-Lf/0.5mL susp susp     bp usually controlled, no med changes  MS flared recently, not quite back to baseline but improved, to see neuro soon  Her depression is giving her anhedonia, it seems, although we are ruling out other causes of fatigue with her labs  Not wanting more meds, and unlikely more would help  Encouraged counseling, she is not really wanting that  She thinks she is ok and daughter just too concerned about it  Going to [facility] this summer should be good for her, as it has been in the past  Encouraged healthy eating and more activity    Discussed the diagnosis and plan and she expressed understanding. Follow-up and Dispositions    · Return in about 6 months (around 10/4/2019) for Annual Wellness Visit.          Keyur Michaels MD

## 2019-04-05 LAB
ALBUMIN SERPL-MCNC: 4.4 G/DL (ref 3.5–4.8)
ALBUMIN/GLOB SERPL: 2.1 {RATIO} (ref 1.2–2.2)
ALP SERPL-CCNC: 56 IU/L (ref 39–117)
ALT SERPL-CCNC: 18 IU/L (ref 0–32)
AST SERPL-CCNC: 21 IU/L (ref 0–40)
BASOPHILS # BLD AUTO: 0 X10E3/UL (ref 0–0.2)
BASOPHILS NFR BLD AUTO: 0 %
BILIRUB SERPL-MCNC: 0.2 MG/DL (ref 0–1.2)
BUN SERPL-MCNC: 25 MG/DL (ref 8–27)
BUN/CREAT SERPL: 32 (ref 12–28)
CALCIUM SERPL-MCNC: 9.5 MG/DL (ref 8.7–10.3)
CHLORIDE SERPL-SCNC: 104 MMOL/L (ref 96–106)
CHOLEST SERPL-MCNC: 143 MG/DL (ref 100–199)
CO2 SERPL-SCNC: 23 MMOL/L (ref 20–29)
CREAT SERPL-MCNC: 0.77 MG/DL (ref 0.57–1)
EOSINOPHIL # BLD AUTO: 0.3 X10E3/UL (ref 0–0.4)
EOSINOPHIL NFR BLD AUTO: 5 %
ERYTHROCYTE [DISTWIDTH] IN BLOOD BY AUTOMATED COUNT: 13.8 % (ref 12.3–15.4)
GLOBULIN SER CALC-MCNC: 2.1 G/DL (ref 1.5–4.5)
GLUCOSE SERPL-MCNC: 89 MG/DL (ref 65–99)
HCT VFR BLD AUTO: 45.2 % (ref 34–46.6)
HDLC SERPL-MCNC: 60 MG/DL
HGB BLD-MCNC: 14.6 G/DL (ref 11.1–15.9)
IMM GRANULOCYTES # BLD AUTO: 0 X10E3/UL (ref 0–0.1)
IMM GRANULOCYTES NFR BLD AUTO: 0 %
INTERPRETATION, 910389: NORMAL
LDLC SERPL CALC-MCNC: 57 MG/DL (ref 0–99)
LYMPHOCYTES # BLD AUTO: 0.8 X10E3/UL (ref 0.7–3.1)
LYMPHOCYTES NFR BLD AUTO: 11 %
MCH RBC QN AUTO: 29.7 PG (ref 26.6–33)
MCHC RBC AUTO-ENTMCNC: 32.3 G/DL (ref 31.5–35.7)
MCV RBC AUTO: 92 FL (ref 79–97)
MONOCYTES # BLD AUTO: 0.9 X10E3/UL (ref 0.1–0.9)
MONOCYTES NFR BLD AUTO: 12 %
NEUTROPHILS # BLD AUTO: 5 X10E3/UL (ref 1.4–7)
NEUTROPHILS NFR BLD AUTO: 72 %
PLATELET # BLD AUTO: 235 X10E3/UL (ref 150–379)
POTASSIUM SERPL-SCNC: 4.6 MMOL/L (ref 3.5–5.2)
PROT SERPL-MCNC: 6.5 G/DL (ref 6–8.5)
RBC # BLD AUTO: 4.92 X10E6/UL (ref 3.77–5.28)
SODIUM SERPL-SCNC: 145 MMOL/L (ref 134–144)
TRIGL SERPL-MCNC: 129 MG/DL (ref 0–149)
TSH SERPL DL<=0.005 MIU/L-ACNC: 1.13 UIU/ML (ref 0.45–4.5)
VLDLC SERPL CALC-MCNC: 26 MG/DL (ref 5–40)
WBC # BLD AUTO: 7.1 X10E3/UL (ref 3.4–10.8)

## 2019-04-05 NOTE — PROGRESS NOTES
Sent in letter:  Labs were all very good, including cholesterol, thyroid, blood counts, kidney, liver, sugar, and electrolytes!

## 2019-05-05 DIAGNOSIS — I10 HYPERTENSION, UNSPECIFIED TYPE: ICD-10-CM

## 2019-05-06 RX ORDER — LOSARTAN POTASSIUM AND HYDROCHLOROTHIAZIDE 25; 100 MG/1; MG/1
1 TABLET ORAL DAILY
Qty: 90 TAB | Refills: 1 | Status: SHIPPED | OUTPATIENT
Start: 2019-05-06 | End: 2020-11-01

## 2019-06-11 ENCOUNTER — OFFICE VISIT (OUTPATIENT)
Dept: FAMILY MEDICINE CLINIC | Age: 74
End: 2019-06-11

## 2019-06-11 VITALS
TEMPERATURE: 97.8 F | WEIGHT: 224.2 LBS | HEIGHT: 63 IN | OXYGEN SATURATION: 97 % | HEART RATE: 65 BPM | DIASTOLIC BLOOD PRESSURE: 65 MMHG | SYSTOLIC BLOOD PRESSURE: 134 MMHG | BODY MASS INDEX: 39.73 KG/M2 | RESPIRATION RATE: 18 BRPM

## 2019-06-11 DIAGNOSIS — K62.5 RECTAL BLEED: ICD-10-CM
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
## 2019-06-11 DIAGNOSIS — R07.89 CHEST WALL PAIN: ICD-10-CM

## 2019-06-11 DIAGNOSIS — F43.10 PTSD (POST-TRAUMATIC STRESS DISORDER): ICD-10-CM

## 2019-06-11 DIAGNOSIS — M25.562 ACUTE PAIN OF BOTH KNEES: Primary | ICD-10-CM

## 2019-06-11 DIAGNOSIS — M25.561 ACUTE PAIN OF BOTH KNEES: Primary | ICD-10-CM

## 2019-06-11 RX ORDER — CLONIDINE HYDROCHLORIDE 0.1 MG/1
0.1 TABLET ORAL
Qty: 30 TAB | Refills: 1 | Status: SHIPPED | OUTPATIENT
Start: 2019-06-11 | End: 2019-08-06 | Stop reason: SDUPTHER

## 2019-06-11 NOTE — PATIENT INSTRUCTIONS
Knee Pain or Injury: Care Instructions  Your Care Instructions    Injuries are a common cause of knee problems. Sudden (acute) injuries may be caused by a direct blow to the knee. They can also be caused by abnormal twisting, bending, or falling on the knee. Pain, bruising, or swelling may be severe, and may start within minutes of the injury. Overuse is another cause of knee pain. Other causes are climbing stairs, kneeling, and other activities that use the knee. Everyday wear and tear, especially as you get older, also can cause knee pain. Rest, along with home treatment, often relieves pain and allows your knee to heal. If you have a serious knee injury, you may need tests and treatment. Follow-up care is a key part of your treatment and safety. Be sure to make and go to all appointments, and call your doctor if you are having problems. It's also a good idea to know your test results and keep a list of the medicines you take. How can you care for yourself at home? · Be safe with medicines. Read and follow all instructions on the label. ? If the doctor gave you a prescription medicine for pain, take it as prescribed. ? If you are not taking a prescription pain medicine, ask your doctor if you can take an over-the-counter medicine. · Rest and protect your knee. Take a break from any activity that may cause pain. · Put ice or a cold pack on your knee for 10 to 20 minutes at a time. Put a thin cloth between the ice and your skin. · Prop up a sore knee on a pillow when you ice it or anytime you sit or lie down for the next 3 days. Try to keep it above the level of your heart. This will help reduce swelling. · If your knee is not swollen, you can put moist heat, a heating pad, or a warm cloth on your knee. · If your doctor recommends an elastic bandage, sleeve, or other type of support for your knee, wear it as directed.   · Follow your doctor's instructions about how much weight you can put on your leg. Use a cane, crutches, or a walker as instructed. · Follow your doctor's instructions about activity during your healing process. If you can do mild exercise, slowly increase your activity. · Reach and stay at a healthy weight. Extra weight can strain the joints, especially the knees and hips, and make the pain worse. Losing even a few pounds may help. When should you call for help? Call 911 anytime you think you may need emergency care. For example, call if:    · You have symptoms of a blood clot in your lung (called a pulmonary embolism). These may include:  ? Sudden chest pain. ? Trouble breathing. ? Coughing up blood.    Call your doctor now or seek immediate medical care if:    · You have severe or increasing pain.     · Your leg or foot turns cold or changes color.     · You cannot stand or put weight on your knee.     · Your knee looks twisted or bent out of shape.     · You cannot move your knee.     · You have signs of infection, such as:  ? Increased pain, swelling, warmth, or redness. ? Red streaks leading from the knee. ? Pus draining from a place on your knee. ? A fever.     · You have signs of a blood clot in your leg (called a deep vein thrombosis), such as:  ? Pain in your calf, back of the knee, thigh, or groin. ? Redness and swelling in your leg or groin.    Watch closely for changes in your health, and be sure to contact your doctor if:    · You have tingling, weakness, or numbness in your knee.     · You have any new symptoms, such as swelling.     · You have bruises from a knee injury that last longer than 2 weeks.     · You do not get better as expected. Where can you learn more? Go to http://brittany-ramírez.info/. Enter K195 in the search box to learn more about \"Knee Pain or Injury: Care Instructions. \"  Current as of: September 23, 2018  Content Version: 11.9  © 0412-3699 [employer].  Care instructions adapted under license by [facility] (which disclaims liability or warranty for this information). If you have questions about a medical condition or this instruction, always ask your healthcare professional. [place] [address] any warranty or liability for your use of this information. [facility], [facility]  [address], [address]   MINISTRY [facility]   [address]   [place], [address]  Phone [phone]  Fax [phone]  [employer]    Dr. Arturo Toth, [address] counselor, substance abuse counselor  [place]., [facility], [address]  Phone: [phone]    [facility] counseling  [address]; [address] [facility], [address] [place] [address]  [phone]  Doyne Holbrook. [address]  [address] [place], [address]  [phone]    [facility], [facility]  Clinical Counseling and Consulting of [address] [facility] all ages, problems  [address], [address]  [facility], [address] [place] [address]  Phone: [phone]  Fax: [phone]  Specializes in chronic pain patients  Vandana Fentonbrew  [address] [facility]   [address] [facility] 83,[address] 100  [place]  Phone:  [phone]     [facility] [address] [facility] 99, [facility], 40 [facility]  Phone: [phone]    Family [facility] and [facility]   (adolescents, adults, families for all problems)  [address] [facility],[address] Floor, [address] [facility]  [employer]  Phone: [phone]    [place] [address] Office  Phone: [phone] [address]   [address] [facility], [address] [place] [address]   Call Carmela Schroeder for [address] Office  Phone: [phone]  FAX: [phone]  [address]  Phone: [phone]  FAX: [phone] [address] [facility] 300 [facility] Office  [phone]  FAX: [phone], [address]    [facility] Office  [phone]  FAX: 800 [facility], 295 [facility], [address]    [place] [address] [facility], [address] [facility]  [phone]    [employer] of [address], Ph.D  Chris Elder [address], [address] [facility] [address]   [phone]    The [address] [facility] Office  [address] [facility], [address]  [phone]    [facility] Office  [address], [address], [address] [place] [address], [facility], [address]   [phone]    [facility] ([place] near [facility])  [phone]    [facility] [address] Location  [address], [facility]    [address] [facility] and [facility] EMELY [facility]  [address] [place], [address] [place] [address], [address]  [phone]
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
## 2019-09-02 RX ORDER — BUPROPION HYDROCHLORIDE 150 MG/1
TABLET ORAL
Qty: 90 TAB | Refills: 0 | Status: SHIPPED | OUTPATIENT
Start: 2019-09-02 | End: 2019-12-02 | Stop reason: SDUPTHER

## 2019-11-04 ENCOUNTER — HOSPITAL ENCOUNTER (OUTPATIENT)
Dept: MAMMOGRAPHY | Age: 74
Discharge: HOME | End: 2019-11-04
Attending: FAMILY MEDICINE
Payer: MEDICARE

## 2019-11-04 DIAGNOSIS — Z12.31 VISIT FOR SCREENING MAMMOGRAM: ICD-10-CM

## 2019-11-04 PROCEDURE — 77067 SCR MAMMO BI INCL CAD: CPT

## 2019-12-31 ENCOUNTER — OFFICE VISIT (OUTPATIENT)
Dept: FAMILY MEDICINE CLINIC | Age: 74
End: 2019-12-31

## 2019-12-31 VITALS
DIASTOLIC BLOOD PRESSURE: 60 MMHG | WEIGHT: 217 LBS | OXYGEN SATURATION: 94 % | TEMPERATURE: 97.7 F | HEART RATE: 68 BPM | RESPIRATION RATE: 16 BRPM | HEIGHT: 63 IN | SYSTOLIC BLOOD PRESSURE: 116 MMHG | BODY MASS INDEX: 38.45 KG/M2

## 2019-12-31 DIAGNOSIS — Z00.00 MEDICARE ANNUAL WELLNESS VISIT, SUBSEQUENT: Primary | ICD-10-CM

## 2019-12-31 DIAGNOSIS — Z13.31 SCREENING FOR DEPRESSION: ICD-10-CM

## 2019-12-31 DIAGNOSIS — E78.5 HYPERLIPIDEMIA, UNSPECIFIED HYPERLIPIDEMIA TYPE: ICD-10-CM

## 2019-12-31 DIAGNOSIS — Z13.39 SCREENING FOR ALCOHOLISM: ICD-10-CM

## 2019-12-31 DIAGNOSIS — E66.01 SEVERE OBESITY (BMI 35.0-39.9) WITH COMORBIDITY (HCC): ICD-10-CM

## 2019-12-31 DIAGNOSIS — G35 MS (MULTIPLE SCLEROSIS) (HCC): ICD-10-CM

## 2019-12-31 DIAGNOSIS — I10 ESSENTIAL HYPERTENSION: ICD-10-CM

## 2019-12-31 DIAGNOSIS — Z78.0 POSTMENOPAUSAL STATE: ICD-10-CM

## 2019-12-31 DIAGNOSIS — E03.9 ACQUIRED HYPOTHYROIDISM: ICD-10-CM

## 2019-12-31 DIAGNOSIS — R39.81 FUNCTIONAL INCONTINENCE: ICD-10-CM

## 2019-12-31 DIAGNOSIS — Z71.89 ADVANCED DIRECTIVES, COUNSELING/DISCUSSION: ICD-10-CM

## 2019-12-31 DIAGNOSIS — F33.0 MILD EPISODE OF RECURRENT MAJOR DEPRESSIVE DISORDER (HCC): ICD-10-CM

## 2019-12-31 DIAGNOSIS — M81.6 LOCALIZED OSTEOPOROSIS WITHOUT CURRENT PATHOLOGICAL FRACTURE: ICD-10-CM

## 2019-12-31 DIAGNOSIS — Z12.11 SCREEN FOR COLON CANCER: ICD-10-CM
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
## 2019-12-31 NOTE — PROGRESS NOTES
HISTORY OF PRESENT ILLNESS  Maykel Ríos is a 75 y.o. female. seen for wellness exam and 6 months follow up on chronic medical conditions  She is recovering from bad URI and bronchitis ( 2 weeks ago), which was treated with Doxy and steroid . PMH significant for MS, hypothyroid, HTN,dyslipidemia, depression and chronic pain sx  HPI  Cardiovascular Review  The patient has hypertension and hyperlipidemia. She reports taking medications as instructed, no medication side effects noted, no chest pain on exertion, no dyspnea on exertion, no swelling of ankles, no orthostatic dizziness or lightheadedness, no orthopnea or paroxysmal nocturnal dyspnea, no palpitations. Diet and Lifestyle: generally follows a low fat low cholesterol diet, generally follows a low sodium diet, sedentary, nonsmoker. Lab review: labs reviewed and discussed with patient. Medicines: Simvastatin 40 mg, Clonidine 0.1 mg qhs, Losartan/Hctz ( 100/25 ) mg, aspirin 81 mg  Lab Results   Component Value Date/Time    Cholesterol, total 143 04/04/2019 11:23 AM    HDL Cholesterol 60 04/04/2019 11:23 AM    LDL, calculated 57 04/04/2019 11:23 AM    VLDL, calculated 26 04/04/2019 11:23 AM    Triglyceride 129 04/04/2019 11:23 AM       Endocrine Review  Patient is seen for followup of hypothyroidism. Since last visit: no changes. She reports medication compliance: all the time and is taking separate from all other meds. She reports the following concerns/problems/med side effects: none. Lab review: labs reviewed and discussed with patient. On Levothyroxine 112 mcg  Lab Results   Component Value Date/Time    TSH 1.130 04/04/2019 11:23 AM    T4, Free 1.43 06/11/2014 01:00 PM       Depression is well managed on Wellbutrin 150 mg, XL, Duloxetin 30 mg daily    Endocrine Review  She is seen for osteoporosis. Since last visit: had rec;ast injection. She is on the following treatment: Reclast injections yearly (last injection: 10/2019).   She reports compliance with all meds, and denies any med side effects. She denies any  joint pain. Review of Systems   Constitutional: Negative for chills, fever and malaise/fatigue. HENT: Negative for congestion, ear pain, sore throat and tinnitus. Eyes: Negative for blurred vision, double vision, pain and discharge. Respiratory: Positive for cough. Negative for shortness of breath and wheezing. Cardiovascular: Negative for chest pain, palpitations and leg swelling. Gastrointestinal: Negative for abdominal pain, blood in stool, constipation, diarrhea, nausea and vomiting. Genitourinary: Negative for dysuria, frequency, hematuria and urgency. Musculoskeletal: Negative for back pain, joint pain and myalgias. Skin: Negative for rash. Neurological: Negative for dizziness, tremors, seizures and headaches. Endo/Heme/Allergies: Negative for polydipsia. Does not bruise/bleed easily. Psychiatric/Behavioral: Negative for depression and substance abuse. The patient is not nervous/anxious. Physical Exam  Constitutional:       Comments: Examined in chair   Pulmonary:      Comments: Bilateral basal fine crackles        ASSESSMENT and PLAN  Diagnoses and all orders for this visit:    1. Medicare annual wellness visit, subsequent    2. Severe obesity (BMI 35.0-39. 9) with comorbidity [facility])  Assessment & Plan:  Uncontrolled, based on history, physical exam and review of pertinent labs, studies and medications; meds reconciled; continue current treatment plan, lifestyle modifications recommended, medication compliance emphasized.   Key Obesity Meds             levothyroxine (SYNTHROID) 112 mcg tablet (Taking) TAKE 1 TABLET BY MOUTH EVERY DAY BEFORE BREAKFAST        Lab Results   Component Value Date/Time    Glucose 89 04/04/2019 11:23 AM    Cholesterol, total 143 04/04/2019 11:23 AM    HDL Cholesterol 60 04/04/2019 11:23 AM    LDL, calculated 57 04/04/2019 11:23 AM    Triglyceride 129 04/04/2019 11:23 AM    TSH 1.130 04/04/2019 11:23 AM    Sodium 145 04/04/2019 11:23 AM    Potassium 4.6 04/04/2019 11:23 AM    ALT (SGPT) 18 04/04/2019 11:23 AM    AST (SGOT) 21 04/04/2019 11:23 AM             3. MS (multiple sclerosis) ([facility] [address]  Assessment & Plan: This condition is managed by Specialist.she follows Neurology. She is off immune therapy  Lab Results   Component Value Date/Time    WBC 7.1 04/04/2019 11:23 AM    HGB 14.6 04/04/2019 11:23 AM    HCT 45.2 04/04/2019 11:23 AM    PLATELET 032 89/20/6502 11:23 AM    Creatinine 0.77 04/04/2019 11:23 AM    BUN 25 04/04/2019 11:23 AM    Potassium 4.6 04/04/2019 11:23 AM       Orders:  -     CBC WITH AUTOMATED DIFF    4. Acquired hypothyroidism  -     TSH AND FREE T4    5. Hyperlipidemia, unspecified hyperlipidemia type  -     METABOLIC PANEL, COMPREHENSIVE  -     LIPID PANEL    6. Essential hypertension  -     METABOLIC PANEL, COMPREHENSIVE    7. Mild episode of recurrent major depressive disorder [facility])  Assessment & Plan:  Improving, based on history, physical exam and review of pertinent labs, studies and medications; meds reconciled; continue current treatment plan, lifestyle modifications recommended, medication compliance emphasized. Key Psychotherapeutic Meds             buPROPion XL (WELLBUTRIN XL) 150 mg tablet (Taking) TAKE 1 TABLET BY MOUTH EVERY DAY IN THE MORNING    DULoxetine (CYMBALTA) 30 mg capsule (Taking) Take 30 mg by mouth daily. Other [address] Meds             gabapentin (NEURONTIN) 100 mg capsule (Taking) Take 3 Caps by mouth nightly as needed (rarely uses for leg pain). Lab Results   Component Value Date/Time    Sodium 145 04/04/2019 11:23 AM    Creatinine 0.77 04/04/2019 11:23 AM    TSH 1.130 04/04/2019 11:23 AM    WBC 7.1 04/04/2019 11:23 AM    ALT (SGPT) 18 04/04/2019 11:23 AM    AST (SGOT) 21 04/04/2019 11:23 AM         8. Functional incontinence  -     UA WITH REFLEX MICRO AND CULTURE    9.  Advanced directives, counseling/discussion  - ADVANCE CARE PLANNING FIRST 30 MINS    10. Screen for colon cancer  -     OCCULT BLOOD IMMUNOASSAY,DIAGNOSTIC    11. Screening for depression  -     DEPRESSION SCREEN ANNUAL    12. Screening for alcoholism  -     [facility] ANNUAL ALCOHOL SCREEN 15 MIN    13. Postmenopausal state  -     DEXA BONE DENSITY STUDY AXIAL; Future    14. Localized osteoporosis without current pathological fracture  -     DEXA BONE DENSITY STUDY AXIAL; Future      Discussed lifestyle issues and health guidance given  Patient was given an after visit summary which includes diagnoses, vital signs, current medications, instructions and references & authorized prescriptions . Results of labs will be conveyed to patient, once available. Pt verbalized instructions I provided and expressed understanding of discussion that was held today. Follow-up and Dispositions    · Return for follow up.

## 2019-12-31 NOTE — ACP (ADVANCE CARE PLANNING)
Advance Care Planning    Advance Care Planning (ACP) Provider Conversation Snapshot    Date of ACP Conversation: 12/31/19  Persons included in Conversation:  patient and family  Length of ACP Conversation in minutes:  16 minutes    Authorized Decision Maker (if patient is incapable of making informed decisions): son Marian Knutson and daughter Meli Smith  This person is: Other Legally Authorized Decision Maker (e.g. Next of Kin)            For Patients with Decision Making Capacity:   Values/Goals: Exploration of values, goals, and preferences if recovery is not expected, even with continued medical treatment in the event of:  Imminent death  Severe, permanent brain injury  \"In these circumstances, what matters most to you? \"  Care focused more on comfort or quality of life.     Conversation Outcomes / Follow-Up Plan:   Recommended completion of Advance Directive form after review of ACP materials and conversation with prospective healthcare agent   Recommended communicating the plan and making copies for the healthcare agent, personal physician, and others as appropriate (e.g., health system)  Recommended review of completed ACP document annually or upon change in health status  directive was explained to patient and daughter, and strongly recommended to complete

## 2019-12-31 NOTE — ASSESSMENT & PLAN NOTE
This condition is managed by Specialist.she follows Neurology.  She is off immune therapy  Lab Results   Component Value Date/Time    WBC 7.1 04/04/2019 11:23 AM    HGB 14.6 04/04/2019 11:23 AM    HCT 45.2 04/04/2019 11:23 AM    PLATELET 343 87/29/5717 11:23 AM    Creatinine 0.77 04/04/2019 11:23 AM    BUN 25 04/04/2019 11:23 AM    Potassium 4.6 04/04/2019 11:23 AM

## 2019-12-31 NOTE — ASSESSMENT & PLAN NOTE
Improving, based on history, physical exam and review of pertinent labs, studies and medications; meds reconciled; continue current treatment plan, lifestyle modifications recommended, medication compliance emphasized. Key Psychotherapeutic Meds             buPROPion XL (WELLBUTRIN XL) 150 mg tablet (Taking) TAKE 1 TABLET BY MOUTH EVERY DAY IN THE MORNING    DULoxetine (CYMBALTA) 30 mg capsule (Taking) Take 30 mg by mouth daily. Other [address] [facility] Meds             gabapentin (NEURONTIN) 100 mg capsule (Taking) Take 3 Caps by mouth nightly as needed (rarely uses for leg pain).         Lab Results   Component Value Date/Time    Sodium 145 04/04/2019 11:23 AM    Creatinine 0.77 04/04/2019 11:23 AM    TSH 1.130 04/04/2019 11:23 AM    WBC 7.1 04/04/2019 11:23 AM    ALT (SGPT) 18 04/04/2019 11:23 AM    AST (SGOT) 21 04/04/2019 11:23 AM

## 2019-12-31 NOTE — PROGRESS NOTES
Patient is new to me. This is the Subsequent Medicare Annual Wellness Exam, performed 12 months or more after the Initial AWV or the last Subsequent AWV    I have reviewed the patient's medical history in detail and updated the computerized patient record. We did a Medicare Wellness evaluation including a review of past, family, and social histories with attention to age/sex appropriate screening, risk assessment, preventive care and counseling. Any cognitive, fall risk, or ADL issues identified are addressed separately. A patient specific preventive care plan was provided and appropriate screening tests were ordered as specified. History     Patient Active Problem List   Diagnosis Code    HTN (hypertension) I10    Reflux K21.9    Multiple joint pain M25.50    Arthritis M19.90    Urine, incontinence, stress female N39.3    MS (multiple sclerosis) ([address] G35    Fall from slip, trip, or stumble W01. 0XXA    Urinary retention R33.9    Fall W19. Darlis Teodora    CAD (coronary artery disease) I25.10    Hyperlipidemia E78.5    Hypothyroidism E03.9    Normal cardiac stress test [id number]    Lymphocytic colitis K52.832    Renal stones N20.0    Osteoarthritis M19.90    Osteoporosis M81.0    Hx of migraine headaches Z86.69    Psoriasis L40.9    Glaucoma H40.9    Left-sided weakness R53.1    Chronic edema R60.9    Severe obesity (BMI 35.0-39. 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    Functional incontinence R39.81    Marijuana use F12.90    Mild episode of recurrent major depressive disorder (HCC) F33.0     Past Medical History:   Diagnosis Date    Arthritis     Asthma     had it while she smoked cigaretts    Autoimmune disease ([address] 2004    pt has MS    Burning with urination     CAD (coronary artery disease) 8/7/2012    Chronic pain     Depression     Dyspepsia and other specified disorders of function of stomach  GERD (gastroesophageal reflux disease)     Glaucoma     Glaucoma 8/7/2012    Hx of migraine headaches 8/7/2012    Hyperlipidemia 8/7/2012    Hypertension     Hypothyroidism 8/7/2012    IFG (impaired fasting glucose) 8/7/2012    Lymphocytic colitis 8/7/2012    Morbid obesity ([address]     Multiple sclerosis (HCC)     Normal cardiac stress test 8/7/2012    Osteoarthritis 8/7/2012    Other ill-defined conditions(799.89)     COLITIS    Psoriasis 8/7/2012    Psychiatric disorder     anexity depression    Renal stones 8/7/2012    Sunburn, blistering     Thyroid disease       Past Surgical History:   Procedure Laterality Date    BREAST SURGERY PROCEDURE UNLISTED      cyst removal right breast    HX APPENDECTOMY      and lysis of adhesions    HX BREAST BIOPSY Right     many yrs ago; neg    HX CATARACT REMOVAL      both    HX CHOLECYSTECTOMY  1994    lap    HX KNEE REPLACEMENT      both    HX LYSIS OF ADHESIONS      HX MOHS PROCEDURES  04/19/2017    BCC left infraorbital cheek by Dr. Giovani Barney HX TONSIL AND ADENOIDECTOMY       Current Outpatient Medications   Medication Sig Dispense Refill    levothyroxine (SYNTHROID) 112 mcg tablet TAKE 1 TABLET BY MOUTH EVERY DAY BEFORE BREAKFAST 90 Tab 3    buPROPion XL (WELLBUTRIN XL) 150 mg tablet TAKE 1 TABLET BY MOUTH EVERY DAY IN THE MORNING 90 Tab 3    simvastatin (ZOCOR) 40 mg tablet TAKE 1 TABLET EVERY EVENING 90 Tab 1    cloNIDine HCl (CATAPRES) 0.1 mg tablet TAKE 1 TABLET BY MOUTH EVERY DAY AT NIGHT 90 Tab 1    losartan-hydroCHLOROthiazide (HYZAAR) 100-25 mg per tablet TAKE 1 TAB BY MOUTH DAILY. FOR BLOOD PRESSURE 90 Tab 1    latanoprost (XALATAN) 0.005 % ophthalmic solution Administer 1 Drop to both eyes daily.       timolol (TIMOPTIC) 0.5 % ophthalmic solution INSTILL 1 DROP INTO INTO BOTH EYES TWICE A DAY  3    naproxen (NAPROSYN) 500 mg tablet TAKE ONE TABLET BY MOUTH TWICE DAILY AS NEEDED 60 Tab 1    brinzolamide (AZOPT) 1 % ophthalmic suspension Administer 1 Drop to both eyes two (2) times a day.  gabapentin (NEURONTIN) 100 mg capsule Take 3 Caps by mouth nightly as needed (rarely uses for leg pain).  DULoxetine (CYMBALTA) 30 mg capsule Take 30 mg by mouth daily. 11    cycloSPORINE (RESTASIS) 0.05 % ophthalmic emulsion Administer 1 Drop to both eyes two (2) times a day.  therapeutic multivitamin (THERAGRAN) tablet Take 1 Tab by mouth daily.  aspirin delayed-release 81 mg tablet Take 81 mg by mouth daily.  loperamide (IMODIUM) 2 mg capsule Take  by mouth as needed.  cyanocobalamin (VITAMIN B-12) 1,000 mcg tablet Take 1,000 mcg by mouth daily. Allergies   Allergen Reactions    Adhes.  Band-Tape-Benzalkonium Rash    Alphagan P [Brimonidine] Other (comments)     Severe reaction and damage to the cornea       Family History   Problem Relation Age of Onset    COPD Mother    Stefania Horn Lung Disease Mother     Breast Cancer Mother 43    Coronary Artery Disease Father     Bipolar Disorder Father     Hypertension Father     Heart Disease Sister         A fib    Cancer Sister         pancreatic     Coronary Artery Disease Maternal Grandmother     Coronary Artery Disease Maternal Grandfather      Social History     Tobacco Use    Smoking status: Former Smoker     Last attempt to quit: 2006     Years since quittin.2    Smokeless tobacco: Never Used   Substance Use Topics    Alcohol use: Yes     Comment: 1 DRINK/MO       Depression Risk Factor Screening:     3 most recent PHQ Screens 2018   Little interest or pleasure in doing things Not at all   Feeling down, depressed, irritable, or hopeless Not at all   Total Score PHQ 2 0       Alcohol Risk Factor Screening:   Do you average 1 drink per night or more than 7 drinks a week:  No    On any one occasion in the past three months have you have had more than 3 drinks containing alcohol:  No      Functional Ability and Level of Safety:   Hearing: Hearing is good. Activities of Daily Living: The home contains: handrails and grab bars  Patient needs help with:  transportation and walking    Ambulation: with difficulty, uses a walker    Fall Risk:  Fall Risk Assessment, last 12 mths 12/31/2019   Able to walk? Yes   Fall in past 12 months? Yes   Fall with injury? No   Number of falls in past 12 months 1   Fall Risk Score 1       Abuse Screen:  Patient is not abused    Cognitive Screening   Has your family/caregiver stated any concerns about your memory: no  Cognitive Screening: Normal - MMSE (Mini Mental Status Exam)  30/30 score on MMSE     Patient Care Team   Patient Care Team:  Rachid Champagne MD as PCP - General (Family Practice)  Rachid Champagne MD as PCP - [facility]  Terry Mujica MD (Cardiology)  Jamil Goldman MD (Neurology)  Julio Pearl NP (Dermatology)  My Alvarez MD (Ophthalmology)    Assessment/Plan   Education and counseling provided:  Are appropriate based on today's review and evaluation    Diagnoses and all orders for this visit:    1. Medicare annual wellness visit, subsequent    2. Severe obesity (BMI 35.0-39. 9) with comorbidity [facility])  Assessment & Plan:  Uncontrolled, based on history, physical exam and review of pertinent labs, studies and medications; meds reconciled; continue current treatment plan, lifestyle modifications recommended, medication compliance emphasized.   Key Obesity Meds             levothyroxine (SYNTHROID) 112 mcg tablet (Taking) TAKE 1 TABLET BY MOUTH EVERY DAY BEFORE BREAKFAST        Lab Results   Component Value Date/Time    Glucose 89 04/04/2019 11:23 AM    Cholesterol, total 143 04/04/2019 11:23 AM    HDL Cholesterol 60 04/04/2019 11:23 AM    LDL, calculated 57 04/04/2019 11:23 AM    Triglyceride 129 04/04/2019 11:23 AM    TSH 1.130 04/04/2019 11:23 AM    Sodium 145 04/04/2019 11:23 AM    Potassium 4.6 04/04/2019 11:23 AM ALT (SGPT) 18 04/04/2019 11:23 AM    AST (SGOT) 21 04/04/2019 11:23 AM             3. MS (multiple sclerosis) ([facility] [address]  Assessment & Plan: This condition is managed by Specialist.she follows Neurology. She is off immune therapy  Lab Results   Component Value Date/Time    WBC 7.1 04/04/2019 11:23 AM    HGB 14.6 04/04/2019 11:23 AM    HCT 45.2 04/04/2019 11:23 AM    PLATELET 422 45/99/1292 11:23 AM    Creatinine 0.77 04/04/2019 11:23 AM    BUN 25 04/04/2019 11:23 AM    Potassium 4.6 04/04/2019 11:23 AM       Orders:  -     CBC WITH AUTOMATED DIFF    4. Acquired hypothyroidism  -     TSH AND FREE T4    5. Hyperlipidemia, unspecified hyperlipidemia type  -     METABOLIC PANEL, COMPREHENSIVE  -     LIPID PANEL    6. Essential hypertension  -     METABOLIC PANEL, COMPREHENSIVE    7. Mild episode of recurrent major depressive disorder [facility])  Assessment & Plan:  Improving, based on history, physical exam and review of pertinent labs, studies and medications; meds reconciled; continue current treatment plan, lifestyle modifications recommended, medication compliance emphasized. Key Psychotherapeutic Meds             buPROPion XL (WELLBUTRIN XL) 150 mg tablet (Taking) TAKE 1 TABLET BY MOUTH EVERY DAY IN THE MORNING    DULoxetine (CYMBALTA) 30 mg capsule (Taking) Take 30 mg by mouth daily. Other [address] Meds             gabapentin (NEURONTIN) 100 mg capsule (Taking) Take 3 Caps by mouth nightly as needed (rarely uses for leg pain). Lab Results   Component Value Date/Time    Sodium 145 04/04/2019 11:23 AM    Creatinine 0.77 04/04/2019 11:23 AM    TSH 1.130 04/04/2019 11:23 AM    WBC 7.1 04/04/2019 11:23 AM    ALT (SGPT) 18 04/04/2019 11:23 AM    AST (SGOT) 21 04/04/2019 11:23 AM         8. Functional incontinence  -     UA WITH REFLEX MICRO AND CULTURE    9.  Advanced directives, counseling/discussion  -     ADVANCE CARE PLANNING FIRST 27 MINS    10. Screen for colon cancer  -     OCCULT BLOOD IMMUNOASSAY,DIAGNOSTIC    11. Screening for depression  -     DEPRESSION SCREEN ANNUAL    12. Screening for alcoholism  -     W[place] ANNUAL ALCOHOL SCREEN 15 MIN    13. Postmenopausal state  -     DEXA BONE DENSITY STUDY AXIAL; Future  Discussed lifestyle issues and health guidance given  Patient was given an after visit summary which includes diagnoses, vital signs, current medications, instructions and references & authorized prescriptions . Results of labs will be conveyed to patient, once available. Pt verbalized instructions I provided and expressed understanding of discussion that was held today. Follow-up and Dispositions    · Return for follow up. Health Maintenance Due   Topic Date Due    Shingrix Vaccine Age 49> (2 of 2) 05/01/2019    FOBT Q 1 YEAR, 18+  05/23/2019    MEDICARE YEARLY EXAM  10/03/2019     Discussed lifestyle issues and health guidance given  Patient was given an after visit summary which includes diagnoses, vital signs, current medications, instructions and references & authorized prescriptions . Results of labs will be conveyed to patient, once available. Pt verbalized instructions I provided and expressed understanding of discussion that was held today. Follow-up and Dispositions    · Return for follow up.

## 2019-12-31 NOTE — PATIENT INSTRUCTIONS
Medicare Wellness Visit, Female     The best way to live healthy is to have a lifestyle where you eat a well-balanced diet, exercise regularly, limit alcohol use, and quit all forms of tobacco/nicotine, if applicable. Regular preventive services are another way to keep healthy. Preventive services (vaccines, screening tests, monitoring & exams) can help personalize your care plan, which helps you manage your own care. Screening tests can find health problems at the earliest stages, when they are easiest to treat. [facility] follows the current, evidence-based guidelines published by the [facility] ([facility]) when recommending preventive services for our patients. Because we follow these guidelines, sometimes recommendations change over time as research supports it. (For example, mammograms used to be recommended annually. Even though Medicare will still pay for an annual mammogram, the newer guidelines recommend a mammogram every two years for women of average risk). Of course, you and your doctor may decide to screen more often for some diseases, based on your risk and your co-morbidities (chronic disease you are already diagnosed with). Preventive services for you include:  - Medicare offers their members a free annual wellness visit, which is time for you and your primary care provider to discuss and plan for your preventive service needs. Take advantage of this benefit every year!  -All adults over the age of 72 should receive the recommended pneumonia vaccines. Current USPSTF guidelines recommend a series of two vaccines for the best pneumonia protection.   -All adults should have a flu vaccine yearly and a tetanus vaccine every 10 years.   -All adults age 48 and older should receive the shingles vaccines (series of two vaccines).       -All adults age 38-68 who are overweight should have a diabetes screening test once every three years.   -All adults born between [birth year] and 1966 should be screened once for Hepatitis C.  -Other screening tests and preventive services for persons with diabetes include: an eye exam to screen for diabetic retinopathy, a kidney function test, a foot exam, and stricter control over your cholesterol.   -Cardiovascular screening for adults with routine risk involves an electrocardiogram (ECG) at intervals determined by your doctor.   -Colorectal cancer screenings should be done for adults age 54-65 with no increased risk factors for colorectal cancer. There are a number of acceptable methods of screening for this type of cancer. Each test has its own benefits and drawbacks. Discuss with your doctor what is most appropriate for you during your annual wellness visit. The different tests include: colonoscopy (considered the best screening method), a fecal occult blood test, a fecal DNA test, and sigmoidoscopy.    -A bone mass density test is recommended when a woman turns 65 to screen for osteoporosis. This test is only recommended one time, as a screening. Some providers will use this same test as a disease monitoring tool if you already have osteoporosis. -Breast cancer screenings are recommended every other year for women of normal risk, age 54-69.  -Cervical cancer screenings for women over age 72 are only recommended with certain risk factors. Here is a list of your current Health Maintenance items (your personalized list of preventive services) with a due date:  Health Maintenance Due   Topic Date Due    Shingles Vaccine (2 of 2) 05/01/2019    Stool testing for trace blood  05/23/2019    Annual Well Visit  10/03/2019              Advance Care Planning: Care Instructions  Your Care Instructions  It can be hard to live with an illness that cannot be cured. But if your health is getting worse, you may want to make decisions about end-of-life care. Planning for the end of your life does not mean that you are giving up.  It is a way to make sure that your wishes are met. Clearly stating your wishes can make it easier for your loved ones. Making plans while you are still able may also ease your mind and make your final days less stressful and more meaningful. Follow-up care is a key part of your treatment and safety. Be sure to make and go to all appointments, and call your doctor if you are having problems. It's also a good idea to know your test results and keep a list of the medicines you take. What can you do to plan for the end of life? · You can bring these issues up with your doctor. You do not need to wait until your doctor starts the conversation. You might start with \"I would not be willing to live with . Mandi Ask Mandi Ask Mandi Ask \" When you complete this sentence it helps your doctor understand your wishes. · Talk openly and honestly with your doctor. This is the best way to understand the decisions you will need to make as your health changes. Know that you can always change your mind. · Ask your doctor about commonly used life-support measures. These include tube feedings, breathing machines, and fluids given through a vein (IV). Understanding these treatments will help you decide whether you want them. · You may choose to have these life-supporting treatments for a limited time. This allows a trial period to see whether they will help you. You may also decide that you want your doctor to take only certain measures to keep you alive. It is important to spell out these conditions so that your doctor and family understand them. · Talk to your doctor about how long you are likely to live. He or she may be able to give you an idea of what usually happens with your specific illness. · Think about preparing papers that state your wishes. This way there will not be any confusion about what you want. You can change your instructions at any time. Which papers should you prepare?   Advance directives are legal papers that tell doctors how you want to be cared for at the end of your life. You do not need a  to write these papers. Ask your doctor or your state health department for information on how to write your advance directives. They may have the forms for each of these types of papers. Make sure your doctor has a copy of these on file, and give a copy to a family member or close friend. · Consider a do-not-resuscitate order (DNR). This order asks that no extra treatments be done if your heart stops or you stop breathing. Extra treatments may include cardiopulmonary resuscitation (CPR), electrical shock to restart your heart, or a machine to breathe for you. If you decide to have a DNR order, ask your doctor to explain and write it. Place the order in your home where everyone can easily see it. · Consider a living will. A living will explains your wishes about life support and other treatments at the end of your life if you become unable to speak for yourself. Living andrade tell doctors to use or not use treatments that would keep you alive. You must have one or two witnesses or a notary present when you sign this form. · Consider a durable power of  for health care. This allows you to name a person to make decisions about your care if you are not able to. Most people ask a close friend or family member. Talk to this person about the kinds of treatments you want and those that you do not want. Make sure this person understands your wishes. These legal papers are simple to change. Tell your doctor what you want to change, and ask him or her to make a note in your medical file. Give your family updated copies of the papers. Where can you learn more? Go to http://brittany-ramírez.info/. Enter P184 in the search box to learn more about \"Advance Care Planning: Care Instructions. \"  Current as of: November 17, 2016  Content Version: 11.3  © 8963-8517 [employer].  Care instructions adapted under license by [facility] (which disclaims liability or warranty for this information). If you have questions about a medical condition or this instruction, always ask your healthcare professional. [address] any warranty or liability for your use of this information. Preventing Falls: Care Instructions  Your Care Instructions    Getting around your home safely can be a challenge if you have injuries or health problems that make it easy for you to fall. Loose rugs and furniture in walkways are among the dangers for many older people who have problems walking or who have poor eyesight. People who have conditions such as arthritis, osteoporosis, or dementia also have to be careful not to fall. You can make your home safer with a few simple measures. Follow-up care is a key part of your treatment and safety. Be sure to make and go to all appointments, and call your doctor if you are having problems. It's also a good idea to know your test results and keep a list of the medicines you take. How can you care for yourself at home? Taking care of yourself  · You may get dizzy if you do not drink enough water. To prevent dehydration, drink plenty of fluids, enough so that your urine is light yellow or clear like water. Choose water and other caffeine-free clear liquids. If you have kidney, heart, or liver disease and have to limit fluids, talk with your doctor before you increase the amount of fluids you drink. · Exercise regularly to improve your strength, muscle tone, and balance. Walk if you can. Swimming may be a good choice if you cannot walk easily. · Have your vision and hearing checked each year or any time you notice a change. If you have trouble seeing and hearing, you might not be able to avoid objects and could lose your balance. · Know the side effects of the medicines you take. Ask your doctor or pharmacist whether the medicines you take can affect your balance.  Sleeping pills or sedatives can affect your balance. · Limit the amount of alcohol you drink. Alcohol can impair your balance and other senses. · Ask your doctor whether calluses or corns on your feet need to be removed. If you wear loose-fitting shoes because of calluses or corns, you can lose your balance and fall. · Talk to your doctor if you have numbness in your feet. Preventing falls at home  · Remove raised doorway thresholds, throw rugs, and clutter. Repair loose carpet or raised areas in the floor. · Move furniture and electrical cords to keep them out of walking paths. · Use nonskid floor wax, and wipe up spills right away, especially on ceramic tile floors. · If you use a walker or cane, put rubber tips on it. If you use crutches, clean the bottoms of them regularly with an abrasive pad, such as steel wool. · Keep your house well lit, especially [facility], and outside walkways. Use night-lights in areas such as hallways and bathrooms. Add extra light switches or use remote switches (such as switches that go on or off when you clap your hands) to make it easier to turn lights on if you have to get up during the night. · Install sturdy handrails on stairways. · Move items in your cabinets so that the things you use a lot are on the lower shelves (about waist level). · Keep a cordless phone and a flashlight with new batteries by your bed. If possible, put a phone in each of the main rooms of your house, or carry a cell phone in case you fall and cannot reach a phone. Or, you can wear a device around your neck or wrist. You push a button that sends a signal for help. · Wear low-heeled shoes that fit well and give your feet good support. Use footwear with nonskid soles. Check the heels and soles of your shoes for wear. Repair or replace worn heels or soles. · Do not wear socks without shoes on wood floors. · Walk on the grass when the sidewalks are slippery.  If you live in an area that gets snow and ice in the winter, sprinkle salt on slippery steps and sidewalks. Preventing falls in the bath  · Install grab bars and nonskid mats inside and outside your shower or tub and near the toilet and sinks. · Use shower chairs and bath benches. · Use a hand-held shower head that will allow you to sit while showering. · Get into a tub or shower by putting the weaker leg in first. Get out of a tub or shower with your strong side first.  · Repair loose toilet seats and consider installing a raised toilet seat to make getting on and off the toilet easier. · Keep your bathroom door unlocked while you are in the shower. Where can you learn more? Go to http://brittany-ramírez.info/. Enter [id number] [id number] in the search box to learn more about \"Preventing Falls: Care Instructions. \"  Current as of: March 16, 2018  Content Version: 11.8  © 9041-2229 [employer]. Care instructions adapted under license by [employer] (which disclaims liability or warranty for this information). If you have questions about a medical condition or this instruction, always ask your healthcare professional. [place] [address] any warranty or liability for your use of this information.

## 2019-12-31 NOTE — ASSESSMENT & PLAN NOTE
Uncontrolled, based on history, physical exam and review of pertinent labs, studies and medications; meds reconciled; continue current treatment plan, lifestyle modifications recommended, medication compliance emphasized.   Key Obesity Meds             levothyroxine (SYNTHROID) 112 mcg tablet (Taking) TAKE 1 TABLET BY MOUTH EVERY DAY BEFORE BREAKFAST        Lab Results   Component Value Date/Time    Glucose 89 04/04/2019 11:23 AM    Cholesterol, total 143 04/04/2019 11:23 AM    HDL Cholesterol 60 04/04/2019 11:23 AM    LDL, calculated 57 04/04/2019 11:23 AM    Triglyceride 129 04/04/2019 11:23 AM    TSH 1.130 04/04/2019 11:23 AM    Sodium 145 04/04/2019 11:23 AM    Potassium 4.6 04/04/2019 11:23 AM    ALT (SGPT) 18 04/04/2019 11:23 AM    AST (SGOT) 21 04/04/2019 11:23 AM

## 2020-01-02 LAB
ALBUMIN SERPL-MCNC: 4.4 G/DL (ref 3.5–4.8)
ALBUMIN/GLOB SERPL: 2.4 {RATIO} (ref 1.2–2.2)
ALP SERPL-CCNC: 125 IU/L (ref 39–117)
ALT SERPL-CCNC: 104 IU/L (ref 0–32)
APPEARANCE UR: CLEAR
AST SERPL-CCNC: 61 IU/L (ref 0–40)
BACTERIA #/AREA URNS HPF: ABNORMAL /[HPF]
BACTERIA UR CULT: NORMAL
BASOPHILS # BLD AUTO: 0.1 X10E3/UL (ref 0–0.2)
BASOPHILS NFR BLD AUTO: 1 %
BILIRUB SERPL-MCNC: 0.5 MG/DL (ref 0–1.2)
BILIRUB UR QL STRIP: NEGATIVE
BUN SERPL-MCNC: 31 MG/DL (ref 8–27)
BUN/CREAT SERPL: 38 (ref 12–28)
CALCIUM SERPL-MCNC: 9.8 MG/DL (ref 8.7–10.3)
CASTS URNS MICRO: ABNORMAL
CASTS URNS QL MICRO: PRESENT /LPF
CHLORIDE SERPL-SCNC: 103 MMOL/L (ref 96–106)
CHOLEST SERPL-MCNC: 179 MG/DL (ref 100–199)
CO2 SERPL-SCNC: 20 MMOL/L (ref 20–29)
COLOR UR: YELLOW
CREAT SERPL-MCNC: 0.81 MG/DL (ref 0.57–1)
EOSINOPHIL # BLD AUTO: 0.3 X10E3/UL (ref 0–0.4)
EOSINOPHIL NFR BLD AUTO: 2 %
EPI CELLS #/AREA URNS HPF: ABNORMAL /HPF (ref 0–10)
ERYTHROCYTE [DISTWIDTH] IN BLOOD BY AUTOMATED COUNT: 12.5 % (ref 12.3–15.4)
GLOBULIN SER CALC-MCNC: 1.8 G/DL (ref 1.5–4.5)
GLUCOSE SERPL-MCNC: 81 MG/DL (ref 65–99)
GLUCOSE UR QL: NEGATIVE
HCT VFR BLD AUTO: 47.7 % (ref 34–46.6)
HDLC SERPL-MCNC: 77 MG/DL
HGB BLD-MCNC: 15.4 G/DL (ref 11.1–15.9)
HGB UR QL STRIP: NEGATIVE
IMM GRANULOCYTES # BLD AUTO: 0.1 X10E3/UL (ref 0–0.1)
IMM GRANULOCYTES NFR BLD AUTO: 1 %
INTERPRETATION, 910389: NORMAL
KETONES UR QL STRIP: NEGATIVE
LDLC SERPL CALC-MCNC: 74 MG/DL (ref 0–99)
LEUKOCYTE ESTERASE UR QL STRIP: ABNORMAL
LYMPHOCYTES # BLD AUTO: 2.3 X10E3/UL (ref 0.7–3.1)
LYMPHOCYTES NFR BLD AUTO: 18 %
MCH RBC QN AUTO: 30.1 PG (ref 26.6–33)
MCHC RBC AUTO-ENTMCNC: 32.3 G/DL (ref 31.5–35.7)
MCV RBC AUTO: 93 FL (ref 79–97)
MICRO URNS: ABNORMAL
MONOCYTES # BLD AUTO: 1.4 X10E3/UL (ref 0.1–0.9)
MONOCYTES NFR BLD AUTO: 11 %
MUCOUS THREADS URNS QL MICRO: PRESENT
NEUTROPHILS # BLD AUTO: 8.7 X10E3/UL (ref 1.4–7)
NEUTROPHILS NFR BLD AUTO: 67 %
NITRITE UR QL STRIP: NEGATIVE
PH UR STRIP: 5 [PH] (ref 5–7.5)
PLATELET # BLD AUTO: 258 X10E3/UL (ref 150–450)
POTASSIUM SERPL-SCNC: 5.1 MMOL/L (ref 3.5–5.2)
PROT SERPL-MCNC: 6.2 G/DL (ref 6–8.5)
PROT UR QL STRIP: NEGATIVE
RBC # BLD AUTO: 5.11 X10E6/UL (ref 3.77–5.28)
RBC #/AREA URNS HPF: ABNORMAL /HPF (ref 0–2)
SODIUM SERPL-SCNC: 144 MMOL/L (ref 134–144)
SP GR UR: 1.03 (ref 1–1.03)
T4 FREE SERPL-MCNC: 1.67 NG/DL (ref 0.82–1.77)
TRIGL SERPL-MCNC: 142 MG/DL (ref 0–149)
TSH SERPL DL<=0.005 MIU/L-ACNC: 7.05 UIU/ML (ref 0.45–4.5)
URINALYSIS REFLEX, 377202: ABNORMAL
UROBILINOGEN UR STRIP-MCNC: 1 MG/DL (ref 0.2–1)
VLDLC SERPL CALC-MCNC: 28 MG/DL (ref 5–40)
WBC # BLD AUTO: 12.7 X10E3/UL (ref 3.4–10.8)
WBC #/AREA URNS HPF: ABNORMAL /HPF (ref 0–5)

## 2020-01-02 NOTE — PROGRESS NOTES
Please inform patient and send letter. I am routing results to PCP  CBC shows elevated WBC and neutrophil count. She was on high dose steroid and just completed a day before her visit. So likely steroid induced vs still having residual infection. recommend to repeat in 4-6 weeks  CMP shows normal renal function but has liver enzymes, c/w liver insult either from her bad bronchitis vs medicines she was given.  strongly recommend to repeat in 4-6 weeks  Thyroid function is off, elevated TSH with normal T4, likely sick thyroid, so no change in medicine for now and to repeat level in 4-6 weeks  Cholesterol results normal  Urine negative for infection  thanks

## 2020-01-03 NOTE — PROGRESS NOTES
Outbound call to patiens daughter. Phone went to voicemail and prompted to enter password. Unable to leave message.

## 2020-01-09 NOTE — PROGRESS NOTES
Outbound call placed to patient. Name and  verified. Call placed for most recent lab results. Patient reported understanding and appreciate of call.  Letter sent

## 2020-01-29 ENCOUNTER — HOSPITAL ENCOUNTER (OUTPATIENT)
Dept: BONE DENSITY | Age: 75
Discharge: HOME | End: 2020-01-29
Attending: FAMILY MEDICINE
Payer: MEDICARE

## 2020-01-29 DIAGNOSIS — Z78.0 POSTMENOPAUSAL STATE: ICD-10-CM

## 2020-01-29 DIAGNOSIS — M81.6 LOCALIZED OSTEOPOROSIS WITHOUT CURRENT PATHOLOGICAL FRACTURE: ICD-10-CM

## 2020-01-29 PROCEDURE — 77080 DXA BONE DENSITY AXIAL: CPT

## 2020-01-30 NOTE — PROGRESS NOTES
Please inform patient and I will route results to Dr Arellano Poster as she is managing her Reclast  Dexa c/w osteoporosis.  To continue with Reclastinjection and also Vitamin d3 2000 units and Calcium 1200 mg daily  thanks

## 2020-01-30 NOTE — PROGRESS NOTES
Outbound call placed to patient. Name and  verified. Call placed for most recent lab results. Patient reported understanding and appreciative of call.

## 2020-02-06 RX ORDER — CLONIDINE HYDROCHLORIDE 0.1 MG/1
0.1 TABLET ORAL
Qty: 90 TAB | Refills: 0 | Status: SHIPPED | OUTPATIENT
Start: 2020-02-06 | End: 2020-09-03

## 2020-02-06 RX ORDER — SODIUM CHLORIDE 9 MG/ML
25 INJECTION, SOLUTION INTRAVENOUS CONTINUOUS
Status: DISCONTINUED | OUTPATIENT
Start: 2020-02-11 | End: 2020-02-12 | Stop reason: HOSPADM

## 2020-02-06 RX ORDER — ACETAMINOPHEN 325 MG/1
650 TABLET ORAL
Status: DISCONTINUED | OUTPATIENT
Start: 2020-02-11 | End: 2020-02-12 | Stop reason: HOSPADM

## 2020-02-06 RX ORDER — ZOLEDRONIC ACID 5 MG/100ML
5 INJECTION, SOLUTION INTRAVENOUS ONCE
Status: COMPLETED | OUTPATIENT
Start: 2020-02-11 | End: 2020-02-11

## 2020-02-11 ENCOUNTER — HOSPITAL ENCOUNTER (OUTPATIENT)
Dept: INFUSION THERAPY | Age: 75
Discharge: HOME | End: 2020-02-11
Payer: MEDICARE

## 2020-02-11 VITALS
RESPIRATION RATE: 18 BRPM | TEMPERATURE: 97.8 F | HEART RATE: 66 BPM | BODY MASS INDEX: 38.98 KG/M2 | SYSTOLIC BLOOD PRESSURE: 130 MMHG | WEIGHT: 220 LBS | HEIGHT: 63 IN | DIASTOLIC BLOOD PRESSURE: 60 MMHG

## 2020-02-11 PROCEDURE — 96374 THER/PROPH/DIAG INJ IV PUSH: CPT

## 2020-02-11 PROCEDURE — 74011250636 HC RX REV CODE- 250/636: Performed by: FAMILY MEDICINE

## 2020-02-11 RX ADMIN — ZOLEDRONIC ACID 5 MG: 5 INJECTION, SOLUTION INTRAVENOUS at 14:25

## 2020-02-11 RX ADMIN — SODIUM CHLORIDE 25 ML/HR: 900 INJECTION, SOLUTION INTRAVENOUS at 09:00

## 2020-02-11 NOTE — PROGRESS NOTES
[facility] Short Visit Note:    1400:  Pt arrived ambulatory and in no distress, received Reclast 5mg in a PIV and tolerated well. Labs drawn at on month ago and was used for today's appointment.   D/Cd from [facility] ambulatory and in no distress accompanied by son at 1430      Medications Administered     0.9% sodium chloride infusion     Admin Date  02/11/2020 Action  New Bag Dose  25 mL/hr Rate  25 mL/hr Route  IntraVENous Administered By  Navdeep Brock, ULICES          zoledronic acid (RECLAST) IVPB 5 mg     Admin Date  02/11/2020 Action  Given Dose  5 mg Rate  400 mL/hr Route  IntraVENous Administered By  Navdeep Brock, ULICES              Visit Vitals  /60   Pulse 66   Temp 97.8 °F (36.6 °C)   Resp 18   Ht 5' 3\" (1.6 m)   Wt 99.8 kg (220 lb)   Breastfeeding No   BMI 38.97 kg/m²     Future Appointments   Date Time Provider Gena Carballo   2/11/2021  2:00 PM [facility]

## 2020-02-13 DIAGNOSIS — Z00.00 MEDICARE ANNUAL WELLNESS VISIT, SUBSEQUENT: Primary | ICD-10-CM

## 2020-02-14 LAB — HEMOCCULT STL QL IA: POSITIVE

## 2020-02-16 DIAGNOSIS — R19.5 POSITIVE OCCULT STOOL BLOOD TEST: Primary | ICD-10-CM

## 2020-02-16 NOTE — PROGRESS NOTES
Stool occult positive. Will route result to PCP as she will need referral to GI.  Will make referral for colonoscopy  thanks

## 2020-02-17 NOTE — PROGRESS NOTES
Outbound call to patient. No answer left message for patient to return call to the office regarding labs.

## 2020-02-18 ENCOUNTER — TELEPHONE (OUTPATIENT)
Dept: FAMILY MEDICINE CLINIC | Age: 75
End: 2020-02-18

## 2020-02-18 DIAGNOSIS — E03.9 ACQUIRED HYPOTHYROIDISM: ICD-10-CM

## 2020-02-18 DIAGNOSIS — D72.829 LEUKOCYTOSIS, UNSPECIFIED TYPE: Primary | ICD-10-CM

## 2020-02-18 NOTE — TELEPHONE ENCOUNTER
----- Message from Tonie Vo sent at 2/18/2020 11:40 AM EST -----  Regarding: Dr. Bird Watts      Patient called to request orders for CBC lab follow up. Best contact number is [phone].

## 2020-02-18 NOTE — TELEPHONE ENCOUNTER
LVM that labs ordered and she can come in any time to get them. She doesn't need to fast    Below are the results from dr Maricruz Espinal from 1/9/20 OV. RECOMMENDATIONS:  CBC shows elevated WBC and neutrophil count. She was on high dose steroid and just completed a day before her visit. So likely steroid induced vs still having residual infection. recommend to repeat in 4-6 weeks   CMP shows normal renal function but has liver enzymes, c/w liver insult either from her bad bronchitis vs medicines she was given.  strongly recommend to repeat in 4-6 weeks   Thyroid function is off, elevated TSH with normal T4, likely sick thyroid, so no change in medicine for now and to repeat level in 4-6 weeks   Cholesterol results normal   Urine negative for infection       Do you want to order repeat labs or route back to Dr Maricruz Espinal since she saw the pt?

## 2020-02-20 LAB
BASOPHILS # BLD AUTO: 0 X10E3/UL (ref 0–0.2)
BASOPHILS NFR BLD AUTO: 0 %
EOSINOPHIL # BLD AUTO: 0 X10E3/UL (ref 0–0.4)
EOSINOPHIL NFR BLD AUTO: 0 %
ERYTHROCYTE [DISTWIDTH] IN BLOOD BY AUTOMATED COUNT: 12.6 % (ref 11.7–15.4)
HCT VFR BLD AUTO: 42.7 % (ref 34–46.6)
HGB BLD-MCNC: 14.6 G/DL (ref 11.1–15.9)
IMM GRANULOCYTES # BLD AUTO: 0 X10E3/UL (ref 0–0.1)
IMM GRANULOCYTES NFR BLD AUTO: 0 %
LYMPHOCYTES # BLD AUTO: 1.2 X10E3/UL (ref 0.7–3.1)
LYMPHOCYTES NFR BLD AUTO: 14 %
MCH RBC QN AUTO: 31 PG (ref 26.6–33)
MCHC RBC AUTO-ENTMCNC: 34.2 G/DL (ref 31.5–35.7)
MCV RBC AUTO: 91 FL (ref 79–97)
MONOCYTES # BLD AUTO: 0.7 X10E3/UL (ref 0.1–0.9)
MONOCYTES NFR BLD AUTO: 8 %
NEUTROPHILS # BLD AUTO: 6.5 X10E3/UL (ref 1.4–7)
NEUTROPHILS NFR BLD AUTO: 78 %
PLATELET # BLD AUTO: 257 X10E3/UL (ref 150–450)
RBC # BLD AUTO: 4.71 X10E6/UL (ref 3.77–5.28)
TSH SERPL DL<=0.005 MIU/L-ACNC: 0.77 UIU/ML (ref 0.45–4.5)
WBC # BLD AUTO: 8.4 X10E3/UL (ref 3.4–10.8)

## 2020-08-04 RX ORDER — SIMVASTATIN 40 MG/1
TABLET, FILM COATED ORAL
Qty: 30 TAB | Refills: 0 | Status: SHIPPED | OUTPATIENT
Start: 2020-08-04 | End: 2020-08-29

## 2020-08-28 ENCOUNTER — TELEPHONE (OUTPATIENT)
Dept: FAMILY MEDICINE CLINIC | Age: 75
End: 2020-08-28

## 2020-08-28 NOTE — TELEPHONE ENCOUNTER
----- Message from Heidi Donovan sent at 8/28/2020  2:53 PM EDT -----  Regarding: Dr. Anders Rough: [phone]  Caller's first and last name: Pt  Reason for call: Inform PCP on soreness in mouth. Callback required yes/no and why: yes  Best contact number(s): [phone]  Details to clarify the request: Stated that she has been feeling the soreness in her mouth for about 6 months. Has some blisters and feels raw in some places. Requesting an in office appt.

## 2020-08-29 RX ORDER — SIMVASTATIN 40 MG/1
TABLET, FILM COATED ORAL
Qty: 30 TAB | Refills: 0 | Status: SHIPPED | OUTPATIENT
Start: 2020-08-29 | End: 2020-09-26

## 2020-09-03 RX ORDER — CLONIDINE HYDROCHLORIDE 0.1 MG/1
TABLET ORAL
Qty: 90 TAB | Refills: 0 | Status: SHIPPED | OUTPATIENT
Start: 2020-09-03 | End: 2022-04-08

## 2020-09-07 NOTE — PATIENT INSTRUCTIONS
Canker Sore: Care Instructions  Your Care Instructions  Canker sores are painful white sores in the mouth. They usually begin with a tingling feeling, followed by a red spot or bump that turns white. Canker sores appear most often on the tongue, inside the cheeks, and inside the lips. They can be very painful and can make talking, eating, and drinking difficult. A canker sore may form after an injury or stretching of tissues in the mouth, which can happen, for example, during a dental procedure or teeth cleaning. If you accidentally bite your tongue or the inside of your cheek, you may end up with a canker sore. Other possible causes are infection, certain foods, and stress. Canker sores are not contagious. The pain from your canker sore should decrease in 7 to 10 days, and it should heal completely in 1 to 3 weeks. In most cases, a canker sore will go away by itself. Home treatment can ease pain and discomfort. If you have a large or deep canker sore that does not seem to be getting better after 2 weeks, your doctor may prescribe medicine. Canker sores often come back again. Follow-up care is a key part of your treatment and safety. Be sure to make and go to all appointments, and call your doctor if you are having problems. It's also a good idea to know your test results and keep a list of the medicines you take. How can you care for yourself at home? · Drink cold liquids, such as water or iced tea, or eat flavored ice pops or frozen juices. Use a straw to keep the liquid from coming in contact with your canker sore. · Eat soft, bland foods that are easy to chew and swallow, such as ice cream, custard, applesauce, cottage cheese, macaroni and cheese, soft-cooked eggs, yogurt, or cream soups. · Cut foods into small pieces, or grind, mash, blend, or puree foods to make them easier to chew and swallow.   · While your canker sore heals, avoid coffee, chocolate, spicy and salty foods, citrus fruits, nuts, seeds, and tomatoes. · To soothe your canker sore and help it heal:  ? Use an over-the-counter numbing medicine, such as Orabase or Anbesol. ? Dab a bit of Milk of Magnesia on the canker sore 3 or 4 times a day. · Put ice on your sore to reduce the pain. · Take anti-inflammatory medicines to reduce pain, as needed. These include ibuprofen (Advil, Motrin) and naproxen (Aleve). Read and follow all instructions on the label. · Use a soft-bristle toothbrush, and brush your teeth well but carefully. · Do not smoke or use spit tobacco. Tobacco can cause mouth problems and slow healing. If you need help quitting, talk to your doctor about stop-smoking programs and medicines. These can increase your chances of quitting for good. When should you call for help? Call your doctor now or seek immediate medical care if:    · You have signs of infection, such as:  ? Increased pain, swelling, warmth, or redness. ? Red streaks leading from the area. ? Pus draining from the area. ? A fever. Watch closely for changes in your health, and be sure to contact your doctor if:    · You do not get better as expected. Where can you learn more? Go to http://brittany-ramírez.info/  Enter E216 in the search box to learn more about \"Canker Sore: Care Instructions. \"  Current as of: March 25, 2020               Content Version: 12.6  © 3153-7014 [employer]. Care instructions adapted under license by [employer] (which disclaims liability or warranty for this information). If you have questions about a medical condition or this instruction, always ask your healthcare professional. [place] [address] any warranty or liability for your use of this information.

## 2020-09-07 NOTE — PROGRESS NOTES
[facility]  [address]  [phone]             Date of visit: 9/8/20     Subjective:      History obtained from:  the patient  Jenny Das is a 67 y.o. female who presents today for   Chief Complaint   Patient presents with    Mouth Lesions     6 + months     Vaginal Pain     vaginal burning when she washes      Sores in mouth for 6 months, moving around but always has several at a times  Dentist gave her some sort of liquid, worked well for 2.5 weeks (lidocaine, helped pain but can't keep buying it)  Today has a few blisters on either inner cheek  Things bleed when she brushes her teeth    Also pain/burning on vaginal lip when she washes  Sometimes burns when she washes  Was on right inner labia before, was on left side today  Sometimes hurts to urinate but not usually    Has long history of joint pains  Worries she has psoriatic arthritis  Feet are really painful  Admits to morning stiffness  Has had low back pain since her 35s.   Saw rheum about 20 years ago    Hypothyroidism, remembers her med   Due to recheck     HTN on losartan, hctz, clonidine she normally takes daily but missed it yesterday and today  Checks bp every once in a while and is ok    HLD on zocor     Does have history of depression and anxiety   Uses marijuana nightly for insomnia and RLS doing well    MS stable, sees neuro regularly  No relapse in a year and not on a med now due to age and feels better off it    Was in hospital last year with mild rectal bleeding   Does have occasional colitis    Obesity stable    Pneumovax due and will do today  Flu shot she will do at pharmacy  2nd dose shingrix she will get after pandemic  TDAP will get after pandemic  Last eye visit not long ago; has macular degeneration    Patient Active Problem List    Diagnosis Date Noted    Mild episode of recurrent major depressive disorder ([facility] [address] 04/04/2019    Marijuana use 10/13/2018    Functional incontinence 10/02/2018    Severe obesity (BMI 35.0-39. 9) with comorbidity ([facility] [address] 05/17/2018    Prolapse of vaginal wall with midline cystocele 05/17/2018    Overactive detrusor 05/17/2018    Left-sided weakness 04/04/2017    Chronic edema 04/04/2017    CAD (coronary artery disease) 08/07/2012    Hyperlipidemia 08/07/2012    Hypothyroidism 08/07/2012    Lymphocytic colitis 08/07/2012    Renal stones 08/07/2012    Osteoarthritis 08/07/2012    Osteoporosis 08/07/2012    Hx of migraine headaches 08/07/2012    Psoriasis 08/07/2012    Glaucoma 08/07/2012    MS (multiple sclerosis) ([facility] [address] 10/07/2011    Urinary retention 10/07/2011    HTN (hypertension) 11/10/2010    Reflux 11/10/2010    Multiple joint pain 11/10/2010    Arthritis 11/10/2010    Urine, incontinence, stress female 11/10/2010     Current Outpatient Medications   Medication Sig Dispense Refill    cloNIDine HCL (CATAPRES) 0.1 mg tablet TAKE 1 TABLET BY MOUTH NIGHTLY. *HOLD IF SBP IS LESS THAN OR EQUAL * 90 Tab 0    simvastatin (ZOCOR) 40 mg tablet TAKE 1 TABLET BY MOUTH EVERY DAY IN THE EVENING NEEDS APPT 30 Tab 0    levothyroxine (SYNTHROID) 112 mcg tablet TAKE 1 TABLET BY MOUTH EVERY DAY BEFORE BREAKFAST 90 Tab 3    buPROPion XL (WELLBUTRIN XL) 150 mg tablet TAKE 1 TABLET BY MOUTH EVERY DAY IN THE MORNING 90 Tab 3    losartan-hydroCHLOROthiazide (HYZAAR) 100-25 mg per tablet TAKE 1 TAB BY MOUTH DAILY. FOR BLOOD PRESSURE 90 Tab 1    latanoprost (XALATAN) 0.005 % ophthalmic solution Administer 1 Drop to both eyes daily.  timolol (TIMOPTIC) 0.5 % ophthalmic solution INSTILL 1 DROP INTO INTO BOTH EYES TWICE A DAY  3    naproxen (NAPROSYN) 500 mg tablet TAKE ONE TABLET BY MOUTH TWICE DAILY AS NEEDED 60 Tab 1    brinzolamide (AZOPT) 1 % ophthalmic suspension Administer 1 Drop to both eyes two (2) times a day.       gabapentin (NEURONTIN) 100 mg capsule Take 3 Caps by mouth nightly as needed (rarely uses for leg pain).      DULoxetine (CYMBALTA) 30 mg capsule Take 30 mg by mouth daily. 11    cycloSPORINE (RESTASIS) 0.05 % ophthalmic emulsion Administer 1 Drop to both eyes two (2) times a day.  therapeutic multivitamin (THERAGRAN) tablet Take 1 Tab by mouth daily.  aspirin delayed-release 81 mg tablet Take 81 mg by mouth daily.  loperamide (IMODIUM) 2 mg capsule Take  by mouth as needed.  cyanocobalamin (VITAMIN B-12) 1,000 mcg tablet Take 1,000 mcg by mouth daily. Allergies   Allergen Reactions    Adhes.  Band-Tape-Benzalkonium Rash    Alphagan P [Brimonidine] Other (comments)     Severe reaction and damage to the cornea     Past Medical History:   Diagnosis Date    Arthritis     Asthma     had it while she smoked cigaretts    Autoimmune disease ([address] 2004    pt has MS    Burning with urination     CAD (coronary artery disease) 8/7/2012    Chronic pain     Depression     Dyspepsia and other specified disorders of function of stomach     GERD (gastroesophageal reflux disease)     Glaucoma     Glaucoma 8/7/2012    Hx of migraine headaches 8/7/2012    Hyperlipidemia 8/7/2012    Hypertension     Hypothyroidism 8/7/2012    IFG (impaired fasting glucose) 8/7/2012    Lymphocytic colitis 8/7/2012    Morbid obesity ([address]     Multiple sclerosis ([address]     Normal cardiac stress test 8/7/2012    Osteoarthritis 8/7/2012    Other ill-defined conditions(799.89)     COLITIS    Psoriasis 8/7/2012    Psychiatric disorder     anexity depression    Renal stones 8/7/2012    Sunburn, blistering     Thyroid disease      Past Surgical History:   Procedure Laterality Date    BREAST SURGERY PROCEDURE UNLISTED      cyst removal right breast    HX APPENDECTOMY      and lysis of adhesions    HX BREAST BIOPSY Right     many yrs ago; neg    HX CATARACT REMOVAL      both    HX CHOLECYSTECTOMY  1994    lap    HX KNEE REPLACEMENT      both    HX LYSIS OF ADHESIONS      HX MOHS PROCEDURES 2017    BCC left infraorbital cheek by Dr. Emiliano Cunha    HX TONSIL AND ADENOIDECTOMY       Family History   Problem Relation Age of Onset    COPD Mother     Lung Disease Mother     Breast Cancer Mother 43    Coronary Artery Disease Father     Bipolar Disorder Father     Hypertension Father     Heart Disease Sister         A fib    Cancer Sister         pancreatic     Coronary Artery Disease Maternal Grandmother     Coronary Artery Disease Maternal Grandfather      Social History     Tobacco Use    Smoking status: Former Smoker     Last attempt to quit: 2006     Years since quittin.9    Smokeless tobacco: Never Used   Substance Use Topics    Alcohol use: Yes     Comment: 1 DRINK/MO      Social History     Social History Narrative    Lives with daughter        Review of Systems  Gen: denies fever   GI: denies hematochezia     Objective:     Vitals:    20 1618   BP: 180/63   Pulse: 77   Resp: 18   SpO2: 95%   Weight: 221 lb (100.2 kg)   Height: 5' 3\" (1.6 m)     Body mass index is 39.15 kg/m². General: stated age, well developed, well nourished and in NAD  Mouth: few small apthous ulcers on inner cheeks, tip of tongue  Neck: supple, symmetrical, trachea midline, no adenopathy and thyroid: not enlarged, symmetric, no tenderness/mass/nodules  Lungs:  clear to auscultation w/o rales, rhonchi, wheezes w/normal effort and no use of accessory muscles of respiration   Heart: regular rate and rhythm, S1, S2 normal, no murmur, click, rub or gallop  Abdomen: soft, nontender, no masses  Ext:  No edema noted. Lymph: no cervical adenopathy appreciated  Skin:  Normal. and no rash or abnormalities   MSK: mild tenderness and no swelling of hands MCPs and PIPs, base of right thumb tender with mild deformity at Aia 16 joint. Has interosseous muscle wasting in both hands. Feet with some tenderness at midfoot dorsally, bilaterally.  No tenderness/swelling of MTPs or toes  Psych: alert and oriented to person, place, time and situation and Speech: appropriate quality, quantity and organization of sentences      Assessment/Plan:       ICD-10-CM ICD-9-CM    1. Recurrent aphthous ulcer  K12.0 528.2 C REACTIVE PROTEIN, QT      SED RATE (ESR)      DEVIN, RFLX TO 5-BIOMARKER PROFILE(MELANIE)      HLA-B27      RHEUMATOID FACTOR, QL   2. Essential hypertension  I10 401.9 CBC WITH AUTOMATED DIFF      METABOLIC PANEL, COMPREHENSIVE      LIPID PANEL   3. Acquired hypothyroidism  E03.9 244.9 TSH 3RD GENERATION   4. MS (multiple sclerosis) (HCC)  G35 340    5. Hyperlipidemia, unspecified hyperlipidemia type  E78.5 272.4    6. Severe obesity (BMI 35.0-39. 9) with comorbidity ([facility] [address]  E66.01 278.01    7. Mild episode of recurrent major depressive disorder (HCC)  F33.0 296.31    8. Encounter for immunization  Z23 V03.89 PNEUMOCOCCAL POLYSACCHARIDE VACCINE, 23-VALENT, ADULT OR IMMUNOSUPPRESSED PT DOSE,      ADMIN PNEUMOCOCCAL VACCINE      FLU (FLUAD QUAD INFLUENZA VACCINE,QUAD,ADJUVANTED)      ADMIN INFLUENZA VIRUS VAC   9. Bilateral hand pain  M79.641 729.5 XR HAND RT MIN 3 V    M79.642  XR HAND LT MIN 3 V      REFERRAL TO RHEUMATOLOGY   10. Inflammatory arthritis  M19.90 714.9 C REACTIVE PROTEIN, QT      SED RATE (ESR)      DEVIN, RFLX TO 5-BIOMARKER PROFILE(MELANIE)      HLA-B27      RHEUMATOID FACTOR, QL   11. Bilateral foot pain  M79.671 729.5 XR FOOT RT MIN 3 V    M79.672  XR FOOT LT MIN 3 V      REFERRAL TO RHEUMATOLOGY   12. Chronic SI joint pain  M53.3 724.6 XR SI JTS MAX 2 V    G89.29 338.29 REFERRAL TO RHEUMATOLOGY   13. Chronic bilateral low back pain with right-sided sciatica  M54.41 724.2 XR SPINE LUMB 2 OR 3 V    G89.29 724.3 REFERRAL TO RHEUMATOLOGY     338.29    14.  Psoriasis  L40.9 696.1 REFERRAL TO RHEUMATOLOGY        Orders Placed This Encounter    XR HAND RT MIN 3 V    XR HAND LT MIN 3 V    XR FOOT RT MIN 3 V    XR FOOT LT MIN 3 V    XR SI JTS MAX 2 V    XR SPINE LUMB 2 OR 3 V    PNEUMOCOCCAL POLYSACCHARIDE VACCINE, 23-VALENT, ADULT OR IMMUNOSUPPRESSED PT DOSE,    FLU (FLUAD QUAD INFLUENZA VACCINE,QUAD,ADJUVANTED)    CBC WITH AUTOMATED DIFF    METABOLIC PANEL, COMPREHENSIVE    LIPID PANEL    TSH 3RD GENERATION    C REACTIVE PROTEIN, QT    SED RATE (ESR)    DEVIN, RFLX TO 5-BIOMARKER PROFILE(MELANIE)    HLA-B27    RHEUMATOID FACTOR, QL    [facility]    ADMIN PNEUMOCOCCAL VACCINE    ADMIN INFLUENZA VIRUS VAC     Has quite an autoimmune history (MS, hypothyroid, ulcerative colitis) and with long-standing joint pain and new ulcers in mouth for months without resolution. Will do labs, xrays as above and also refer to rheum for help. bp high on first check today although did miss med for a couple of days, unusual for her. No change to med but continue to monitor at home    MS and other chronic problems stable. Encouraged healthy lifestyle, continuing to try to lose weight. She is very limited in activity due to arthritis and also COVID pandemic    *will plan to call with results*    Discussed the diagnosis and plan and she expressed understanding. F/u Dec AWV  Follow-up and Dispositions    · Return in about 3 months (around 12/8/2020) for Follow up, Annual Wellness Visit.          Jone Haley MD

## 2020-09-08 ENCOUNTER — OFFICE VISIT (OUTPATIENT)
Dept: FAMILY MEDICINE CLINIC | Age: 75
End: 2020-09-08
Payer: MEDICARE

## 2020-09-08 VITALS
SYSTOLIC BLOOD PRESSURE: 180 MMHG | DIASTOLIC BLOOD PRESSURE: 63 MMHG | RESPIRATION RATE: 18 BRPM | HEIGHT: 63 IN | WEIGHT: 221 LBS | OXYGEN SATURATION: 95 % | BODY MASS INDEX: 39.16 KG/M2 | HEART RATE: 77 BPM

## 2020-09-08 DIAGNOSIS — G89.29 CHRONIC SI JOINT PAIN: ICD-10-CM

## 2020-09-08 DIAGNOSIS — M53.3 CHRONIC SI JOINT PAIN: ICD-10-CM

## 2020-09-08 DIAGNOSIS — M19.90 INFLAMMATORY ARTHRITIS: ICD-10-CM

## 2020-09-08 DIAGNOSIS — K12.0 RECURRENT APHTHOUS ULCER: Primary | ICD-10-CM

## 2020-09-08 DIAGNOSIS — E78.5 HYPERLIPIDEMIA, UNSPECIFIED HYPERLIPIDEMIA TYPE: ICD-10-CM

## 2020-09-08 DIAGNOSIS — M79.671 BILATERAL FOOT PAIN: ICD-10-CM

## 2020-09-08 DIAGNOSIS — L40.9 PSORIASIS: ICD-10-CM

## 2020-09-08 DIAGNOSIS — I10 ESSENTIAL HYPERTENSION: ICD-10-CM

## 2020-09-08 DIAGNOSIS — E03.9 ACQUIRED HYPOTHYROIDISM: ICD-10-CM

## 2020-09-08 DIAGNOSIS — M79.642 BILATERAL HAND PAIN: ICD-10-CM

## 2020-09-08 DIAGNOSIS — M79.672 BILATERAL FOOT PAIN: ICD-10-CM

## 2020-09-08 DIAGNOSIS — M54.41 CHRONIC BILATERAL LOW BACK PAIN WITH RIGHT-SIDED SCIATICA: ICD-10-CM

## 2020-09-08 DIAGNOSIS — M79.641 BILATERAL HAND PAIN: ICD-10-CM

## 2020-09-08 DIAGNOSIS — E66.01 SEVERE OBESITY (BMI 35.0-39.9) WITH COMORBIDITY (HCC): ICD-10-CM

## 2020-09-08 DIAGNOSIS — G35 MS (MULTIPLE SCLEROSIS) (HCC): ICD-10-CM

## 2020-09-08 DIAGNOSIS — G89.29 CHRONIC BILATERAL LOW BACK PAIN WITH RIGHT-SIDED SCIATICA: ICD-10-CM

## 2020-09-08 DIAGNOSIS — Z23 ENCOUNTER FOR IMMUNIZATION: ICD-10-CM

## 2020-09-08 DIAGNOSIS — F33.0 MILD EPISODE OF RECURRENT MAJOR DEPRESSIVE DISORDER (HCC): ICD-10-CM

## 2020-09-08 PROCEDURE — 1090F PRES/ABSN URINE INCON ASSESS: CPT

## 2020-09-08 PROCEDURE — G8427 DOCREV CUR MEDS BY ELIG CLIN: HCPCS | Performed by: FAMILY MEDICINE

## 2020-09-08 PROCEDURE — G0009 ADMIN PNEUMOCOCCAL VACCINE: HCPCS | Performed by: FAMILY MEDICINE

## 2020-09-08 PROCEDURE — G9717 DOC PT DX DEP/BP F/U NT REQ: HCPCS | Performed by: FAMILY MEDICINE

## 2020-09-08 PROCEDURE — G8753 SYS BP > OR = 140: HCPCS | Performed by: FAMILY MEDICINE

## 2020-09-08 PROCEDURE — 3288F FALL RISK ASSESSMENT DOCD: CPT | Performed by: FAMILY MEDICINE

## 2020-09-08 PROCEDURE — G0008 ADMIN INFLUENZA VIRUS VAC: HCPCS | Performed by: FAMILY MEDICINE

## 2020-09-08 PROCEDURE — 1100F PTFALLS ASSESS-DOCD GE2>/YR: CPT | Performed by: FAMILY MEDICINE

## 2020-09-08 PROCEDURE — G8417 CALC BMI ABV UP PARAM F/U: HCPCS

## 2020-09-08 PROCEDURE — G8536 NO DOC ELDER MAL SCRN: HCPCS | Performed by: FAMILY MEDICINE

## 2020-09-08 PROCEDURE — 90653 IIV ADJUVANT VACCINE IM: CPT

## 2020-09-08 PROCEDURE — 90732 PPSV23 VACC 2 YRS+ SUBQ/IM: CPT

## 2020-09-08 PROCEDURE — G8754 DIAS BP LESS 90: HCPCS | Performed by: FAMILY MEDICINE

## 2020-09-08 PROCEDURE — 99214 OFFICE O/P EST MOD 30 MIN: CPT | Performed by: FAMILY MEDICINE

## 2020-09-08 NOTE — PROGRESS NOTES
Chief Complaint   Patient presents with    Mouth Lesions     6 + months     Vaginal Pain     vaginal burning when she washes      1. Have you been to the ER, urgent care clinic since your last visit? Hospitalized since your last visit? No    2. Have you seen or consulted any other health care providers outside of the [address] [place] [address] since your last visit? Include any pap smears or colon screening.  No

## 2020-09-15 LAB
ALBUMIN SERPL-MCNC: 4.1 G/DL (ref 3.7–4.7)
ALBUMIN/GLOB SERPL: 2.3 {RATIO} (ref 1.2–2.2)
ALP SERPL-CCNC: 55 IU/L (ref 39–117)
ALT SERPL-CCNC: 14 IU/L (ref 0–32)
ANA SER QL: NEGATIVE
AST SERPL-CCNC: 23 IU/L (ref 0–40)
BASOPHILS # BLD AUTO: 0.1 X10E3/UL (ref 0–0.2)
BASOPHILS NFR BLD AUTO: 1 %
BILIRUB SERPL-MCNC: 0.4 MG/DL (ref 0–1.2)
BUN SERPL-MCNC: 16 MG/DL (ref 8–27)
BUN/CREAT SERPL: 22 (ref 12–28)
CALCIUM SERPL-MCNC: 9.7 MG/DL (ref 8.7–10.3)
CHLORIDE SERPL-SCNC: 107 MMOL/L (ref 96–106)
CHOLEST SERPL-MCNC: 138 MG/DL (ref 100–199)
CO2 SERPL-SCNC: 23 MMOL/L (ref 20–29)
CREAT SERPL-MCNC: 0.72 MG/DL (ref 0.57–1)
CRP SERPL-MCNC: 2 MG/L (ref 0–10)
EOSINOPHIL # BLD AUTO: 0.3 X10E3/UL (ref 0–0.4)
EOSINOPHIL NFR BLD AUTO: 6 %
ERYTHROCYTE [DISTWIDTH] IN BLOOD BY AUTOMATED COUNT: 12.9 % (ref 11.7–15.4)
ERYTHROCYTE [SEDIMENTATION RATE] IN BLOOD BY WESTERGREN METHOD: 47 MM/HR (ref 0–40)
GLOBULIN SER CALC-MCNC: 1.8 G/DL (ref 1.5–4.5)
GLUCOSE SERPL-MCNC: 111 MG/DL (ref 65–99)
HCT VFR BLD AUTO: 45.1 % (ref 34–46.6)
HDLC SERPL-MCNC: 56 MG/DL
HGB BLD-MCNC: 15.1 G/DL (ref 11.1–15.9)
HLA-B27 QL NAA+PROBE: NEGATIVE
IMM GRANULOCYTES # BLD AUTO: 0 X10E3/UL (ref 0–0.1)
IMM GRANULOCYTES NFR BLD AUTO: 1 %
INTERPRETATION, 910389: NORMAL
LDLC SERPL CALC-MCNC: 61 MG/DL (ref 0–99)
LYMPHOCYTES # BLD AUTO: 0.9 X10E3/UL (ref 0.7–3.1)
LYMPHOCYTES NFR BLD AUTO: 16 %
MCH RBC QN AUTO: 30.8 PG (ref 26.6–33)
MCHC RBC AUTO-ENTMCNC: 33.5 G/DL (ref 31.5–35.7)
MCV RBC AUTO: 92 FL (ref 79–97)
MONOCYTES # BLD AUTO: 0.7 X10E3/UL (ref 0.1–0.9)
MONOCYTES NFR BLD AUTO: 12 %
NEUTROPHILS # BLD AUTO: 3.5 X10E3/UL (ref 1.4–7)
NEUTROPHILS NFR BLD AUTO: 64 %
PLATELET # BLD AUTO: 250 X10E3/UL (ref 150–450)
POTASSIUM SERPL-SCNC: 4.5 MMOL/L (ref 3.5–5.2)
PROT SERPL-MCNC: 5.9 G/DL (ref 6–8.5)
RBC # BLD AUTO: 4.91 X10E6/UL (ref 3.77–5.28)
RHEUMATOID FACT SERPL-ACNC: <10 IU/ML (ref 0–13.9)
SODIUM SERPL-SCNC: 145 MMOL/L (ref 134–144)
TRIGL SERPL-MCNC: 120 MG/DL (ref 0–149)
TSH SERPL DL<=0.005 MIU/L-ACNC: 1.46 UIU/ML (ref 0.45–4.5)
VLDLC SERPL CALC-MCNC: 21 MG/DL (ref 5–40)
WBC # BLD AUTO: 5.5 X10E3/UL (ref 3.4–10.8)

## 2020-09-16 ENCOUNTER — TELEPHONE (OUTPATIENT)
Dept: FAMILY MEDICINE CLINIC | Age: 75
End: 2020-09-16

## 2020-09-16 NOTE — TELEPHONE ENCOUNTER
----- Message from Jakob Quiñones sent at 9/15/2020  3:30 PM EDT -----  Regarding: Dr. Nehemias Wang first and last name: Christine Rehmanlandon  Reason for call: Dr. Johnny Chaudhary asked pt to call to schedule appt with Dr. Nhung Rivero. Pt called and could not get an appt until January. Office advised pt to have Dr. Johnny Chaudhary call on her behalf to get a sooner appt.   Callback required yes/no and why: yes  Best contact number(s): [phone]  Details to clarify the request: Phone # to Dr. Nhung Rivero is [phone]

## 2020-09-16 NOTE — TELEPHONE ENCOUNTER
Please advise her to call back and try to schedule with the brand new rheumatologist, I think his name is Cj. She should be able to get in much sooner with him.

## 2020-09-16 NOTE — PROGRESS NOTES
Sent in letter:  Labs looking for inflammatory arthritis like psoriatic arthritis were very normal. I hope the x-rays will give us some clues. However, these conditions can be hard to diagnose, and I definitely still recommend you see a rheumatologist.  I hope you are able to get in quickly.   In the meantime, especially if you get worse, please give us a call or send a note on the [employer] portal.

## 2020-09-18 ENCOUNTER — HOSPITAL ENCOUNTER (OUTPATIENT)
Dept: GENERAL RADIOLOGY | Age: 75
Discharge: HOME | End: 2020-09-18
Payer: MEDICARE

## 2020-09-18 DIAGNOSIS — G89.29 CHRONIC SI JOINT PAIN: ICD-10-CM

## 2020-09-18 DIAGNOSIS — M53.3 CHRONIC SI JOINT PAIN: ICD-10-CM

## 2020-09-18 DIAGNOSIS — M54.41 CHRONIC BILATERAL LOW BACK PAIN WITH RIGHT-SIDED SCIATICA: ICD-10-CM

## 2020-09-18 DIAGNOSIS — G89.29 CHRONIC BILATERAL LOW BACK PAIN WITH RIGHT-SIDED SCIATICA: ICD-10-CM

## 2020-09-18 DIAGNOSIS — M79.641 BILATERAL HAND PAIN: ICD-10-CM

## 2020-09-18 DIAGNOSIS — M79.671 BILATERAL FOOT PAIN: ICD-10-CM

## 2020-09-18 DIAGNOSIS — M79.642 BILATERAL HAND PAIN: ICD-10-CM

## 2020-09-18 DIAGNOSIS — M79.672 BILATERAL FOOT PAIN: ICD-10-CM

## 2020-09-18 PROCEDURE — 73130 X-RAY EXAM OF HAND: CPT | Performed by: FAMILY MEDICINE

## 2020-09-18 PROCEDURE — 73630 X-RAY EXAM OF FOOT: CPT | Performed by: FAMILY MEDICINE

## 2020-09-18 PROCEDURE — 72100 X-RAY EXAM L-S SPINE 2/3 VWS: CPT | Performed by: FAMILY MEDICINE

## 2020-09-18 PROCEDURE — 72200 X-RAY EXAM SI JOINTS: CPT | Performed by: FAMILY MEDICINE

## 2020-09-25 ENCOUNTER — TELEPHONE (OUTPATIENT)
Dept: FAMILY MEDICINE CLINIC | Age: 75
End: 2020-09-25

## 2020-09-25 RX ORDER — TRIAMCINOLONE ACETONIDE 1 MG/G
PASTE DENTAL
Qty: 5 G | Refills: 5 | Status: SHIPPED | OUTPATIENT
Start: 2020-09-25 | End: 2021-01-05

## 2020-09-25 NOTE — TELEPHONE ENCOUNTER
Pt called to request if Dr. Meryl Najjar can prescribe something for pain in her mouth. She said Dr. Meryl Najjar knows her condition.     BCB: [phone]

## 2020-09-25 NOTE — TELEPHONE ENCOUNTER
Spoke with patient  She has 3 blisters  Tongue has healed a bit (but may crack again)  The left inner cheek is a very tender huge ridge  Still has in vaginal area as well  The blisters go away in a few days but keep coming back  Will try kenalog paste; explained how to use it  She has learned what foods she can tolerate, doing soft, etc    Discussed xrays, maybe seeing podiatrist  She will defer for now as she is not out and active on feet much during covid but may want to go alter

## 2020-09-26 ENCOUNTER — TELEPHONE (OUTPATIENT)
Dept: FAMILY MEDICINE CLINIC | Age: 75
End: 2020-09-26

## 2020-09-26 RX ORDER — SIMVASTATIN 40 MG/1
TABLET, FILM COATED ORAL
Qty: 90 TAB | Refills: 0 | Status: SHIPPED | OUTPATIENT
Start: 2020-09-26 | End: 2020-12-19

## 2020-10-15 NOTE — TELEPHONE ENCOUNTER
----- Message from Riley Thomason sent at 10/14/2020  5:02 PM EDT -----  Regarding: /Telephone                                                    Caller's first and last name:self  Reason for call:                 message  Callback required yes/no and why: YES  Best contact number(s):               [phone]  Details to clarify the request:      Pt. wanted  to know that she is still having hard time with her mouth, its still in pain and she stopped taking that medication that was given for it a week ago because it was making things worse. Pt. would like to know what to do next.

## 2020-10-20 RX ORDER — DEXAMETHASONE 0.5 MG/5ML
ELIXIR ORAL
Qty: 237 ML | Refills: 1 | Status: SHIPPED | OUTPATIENT
Start: 2020-10-20 | End: 2020-11-30

## 2020-10-20 NOTE — TELEPHONE ENCOUNTER
Apologized that I had misplaced her message somehow but did talk to her  Mouth no better  Tongue not white but tip of tonue hurts  Inner left cheek  Also gets blisters that aren't really sore; the erosions are what hurt  Still has painful blisters on labia as well  Kenalog paste no help  Peroxide swish/spit soothes it some so nothing wnating something else to soothe it, wants something to get rid of it  Will try a diferent steroid in a liquid form, advised to swish for 5 min then spit, 4x daily, wait 30 min to eat/drink after each dose  If not starting to improve in 1  Week come back to see me  Also has appt with rheum but not for about 6 more weeks

## 2020-10-31 DIAGNOSIS — I10 HYPERTENSION, UNSPECIFIED TYPE: ICD-10-CM

## 2020-11-01 RX ORDER — LOSARTAN POTASSIUM AND HYDROCHLOROTHIAZIDE 25; 100 MG/1; MG/1
TABLET ORAL
Qty: 90 TAB | Refills: 1 | Status: SHIPPED | OUTPATIENT
Start: 2020-11-01 | End: 2021-07-19 | Stop reason: SDUPTHER

## 2020-11-06 ENCOUNTER — TELEPHONE (OUTPATIENT)
Dept: FAMILY MEDICINE CLINIC | Age: 75
End: 2020-11-06

## 2020-11-06 NOTE — TELEPHONE ENCOUNTER
----- Message from Maricarmen Haile sent at 11/5/2020  2:07 PM EST -----  Regarding: Dr. Tammy Webber telephone  General Message/Vendor Calls    Caller's first and last name: pt       Reason for call: Pt states that the steroid medication is not helping. Pt states she still have white coating on her tongue and believes she may have thrush. Pt would like to know if an antifungal medication can be an option for her sx.         Callback required yes/no and why: yes       Best contact number(s): [phone]      Details to clarify the request: vianca Haile

## 2020-11-25 ENCOUNTER — OFFICE VISIT (OUTPATIENT)
Dept: RHEUMATOLOGY | Age: 75
End: 2020-11-25
Payer: MEDICARE

## 2020-11-25 VITALS
SYSTOLIC BLOOD PRESSURE: 124 MMHG | OXYGEN SATURATION: 95 % | WEIGHT: 219 LBS | RESPIRATION RATE: 20 BRPM | HEIGHT: 63 IN | BODY MASS INDEX: 38.8 KG/M2 | TEMPERATURE: 97.3 F | HEART RATE: 70 BPM | DIASTOLIC BLOOD PRESSURE: 55 MMHG

## 2020-11-25 DIAGNOSIS — M81.0 AGE-RELATED OSTEOPOROSIS WITHOUT CURRENT PATHOLOGICAL FRACTURE: Primary | ICD-10-CM

## 2020-11-25 DIAGNOSIS — M47.816 LUMBAR FACET ARTHROPATHY: ICD-10-CM

## 2020-11-25 DIAGNOSIS — M11.89 PSEUDOGOUT INVOLVING MULTIPLE JOINTS: ICD-10-CM

## 2020-11-25 PROCEDURE — G8536 NO DOC ELDER MAL SCRN: HCPCS | Performed by: INTERNAL MEDICINE

## 2020-11-25 PROCEDURE — 1090F PRES/ABSN URINE INCON ASSESS: CPT | Performed by: INTERNAL MEDICINE

## 2020-11-25 PROCEDURE — G8417 CALC BMI ABV UP PARAM F/U: HCPCS | Performed by: INTERNAL MEDICINE

## 2020-11-25 PROCEDURE — G8754 DIAS BP LESS 90: HCPCS | Performed by: INTERNAL MEDICINE

## 2020-11-25 PROCEDURE — G9717 DOC PT DX DEP/BP F/U NT REQ: HCPCS | Performed by: INTERNAL MEDICINE

## 2020-11-25 PROCEDURE — G8752 SYS BP LESS 140: HCPCS | Performed by: INTERNAL MEDICINE

## 2020-11-25 PROCEDURE — 3288F FALL RISK ASSESSMENT DOCD: CPT | Performed by: INTERNAL MEDICINE

## 2020-11-25 PROCEDURE — G8427 DOCREV CUR MEDS BY ELIG CLIN: HCPCS | Performed by: INTERNAL MEDICINE

## 2020-11-25 PROCEDURE — 1100F PTFALLS ASSESS-DOCD GE2>/YR: CPT | Performed by: INTERNAL MEDICINE

## 2020-11-25 PROCEDURE — 99205 OFFICE O/P NEW HI 60 MIN: CPT | Performed by: INTERNAL MEDICINE

## 2020-11-25 RX ORDER — TERIPARATIDE 250 UG/ML
20 INJECTION, SOLUTION SUBCUTANEOUS DAILY
Qty: 1 SYRINGE | Refills: 11 | Status: SHIPPED | OUTPATIENT
Start: 2020-11-25 | End: 2021-04-11 | Stop reason: SDUPTHER

## 2020-11-25 NOTE — PATIENT INSTRUCTIONS
1. Pain consult for low back injections, call [phone] if you'd like to schedule with Dr. Emile Chatman, if your insurance is accepted there. 2. Labs at your earliest convenience from [facility].    3. Given your bone density was still declining with the Reclast, I'll see if we can get approval for Forteo injections. If you don't hear from our pharmacy in the next 2 weeks, call or message me so I can see where the holdup is. 4. I don't think you have psoriatic arthritis; more likely you have pseudogout affecting the midfoot, and pain related to poor blood flow in the feet. 5. Talk to your PCP about the value and timing of working up peripheral vascular disease as a cause of your avascular necrosis. 6. Return in 3 months.

## 2020-11-25 NOTE — PROGRESS NOTES
REASON FOR VISIT:    is a 67 y.o. female with history of relapsing-remitting multiple sclerosis, osteoporosis, and possibly ulcerative colitis who is being referred to [facility] at the request of Dr. Faheem Portillo, regarding a diagnosis of joint pain. HISTORY OF PRESENT ILLNESS     Has had scalp psoriasis since childhood, later had discolored fingers and small plaques on the knees. Has longstanding aches and pains. Low back is painful, left foot hurts. Left foot can become swollen. Pain comes and goes, every day is different in terms of both stiffness and pain. Now avoids making morning appointments due to morning stiffness. Low back is more bothersome with prolonged sitting. She did also have knees replaced nearly 20 years ago, though doesn't recall being told whether there was more than osteoarthritis present. Xrays in September demonstrated bilateral 2nd metatarsal AVN and corticated erosions in the left midfoot. Labs have been negative for DEVIN, RF, and HLA-B27. Has been having mouth ulcers, redness and irritation of the gums. Bleeding with brushing. Tried triamcinolone paste which wasn't very helpful, switched to dexamethasone which helps incompletely as long as she has it. Avoids the sun largely due to the difficulty getting outside, no easy access to yard in home; no clear sun-related rashes. She uses Tgel for her scalp now. Has seen dermatology before mostly for skin cancer monitoring. Generally skin patches are sparse enough outside of the scalp that she doesn't feel they are worth treating. She was diagnosed with MS 15-20 years ago though suspects she had this as a teenager, when she was having syncopal episodes and diagnosed with epilepsy, though didn't take any AEDs for longer than a year or two. No problems with syncopal episodes since her pregnancy, though has waxing/waning weakness particularly on the left side, with relapsing-remitting phenotype.  Says she has only needed prednisone when she would have occasional bronchitis when she was smoking and would require treatment of bronchitis. Had taken copaxone and Tecfidera but in her 65's has weaned off of these. Has lost about an inch of height, from 5'4 to 5'3\". Diagnosed with ulcerative colitis about 10 years ago when hospitalized, but says no chronic diarrhea and well-controlled with diet alone; naproxen for her joints doesn't seem to upset the gut. Received Reclast around Feb 2018 and 2019 with DXA scan in       REVIEW OF SYSTEMS  Negatives as follows:  CONSTITUTlONAL:   +fatigue, weakness. Denies unexplained persistent fevers, weight change  HEAD/EYES:              +eye redness,pain 2/2 glaucoma, no HA, temporal pain  ENT:                            +oral/nasal ulcers, No recurrent sinus infections, dry mouth  RESPIRATORY:         +dry cough and exertional dyspnea. No pleuritic pain, history of pleural effusions, hemoptysis  CARDIOVASCULAR:            Denies chest pain, history of pericardial effusions  GASTRO:                    +chronic diarrheaDenies heartburn, esophageal dysmotility, abdominal pain, nausea, vomiting, diarrhea, blood in the stool  HEMATOLOGIC:        +easy bruising. No\ purpura, swollen lymph nodes  SKIN:                           +nodules, Denies alopecia, sun sensitivity, unexplained persistent rash   VASCULAR:                Denies edema, cyanosis, raynaud phenomenon  NEUROLOGIC:           +paresthesias. Denies specific muscle weakness   PSYCHIATRIC:           No sleep disturbance / snoring, depression, anxiety  MSK:                           +morning stiffness 1 hour, SI joint pain, persistent joint swelling, persistent joint pain    Review of systems is as above and is otherwise negative. ALLERGIES  Adhes.  band-tape-benzalkonium and Alphagan p [brimonidine]    MEDICATIONS  Current Outpatient Medications   Medication Sig    losartan-hydroCHLOROthiazide (HYZAAR) 100-25 mg per tablet TAKE 1 TABLET BY MOUTH DAILY FOR BLOOD PRESSURE    dexAMETHasone (DECADRON) 0.5 mg/5 mL elixir Swish/spit 5mL 4x daily for up to 2 weeks    simvastatin (ZOCOR) 40 mg tablet TAKE 1 TABLET BY MOUTH EVERY DAY IN THE EVENING    triamcinolone acetonide (KENALOG) 0.1 % dental paste Apply small amt to blisters in mouth twice daily as needed    cloNIDine HCL (CATAPRES) 0.1 mg tablet TAKE 1 TABLET BY MOUTH NIGHTLY. *HOLD IF SBP IS LESS THAN OR EQUAL *    levothyroxine (SYNTHROID) 112 mcg tablet TAKE 1 TABLET BY MOUTH EVERY DAY BEFORE BREAKFAST    buPROPion XL (WELLBUTRIN XL) 150 mg tablet TAKE 1 TABLET BY MOUTH EVERY DAY IN THE MORNING    latanoprost (XALATAN) 0.005 % ophthalmic solution Administer 1 Drop to both eyes daily.  timolol (TIMOPTIC) 0.5 % ophthalmic solution INSTILL 1 DROP INTO INTO BOTH EYES TWICE A DAY    naproxen (NAPROSYN) 500 mg tablet TAKE ONE TABLET BY MOUTH TWICE DAILY AS NEEDED    brinzolamide (AZOPT) 1 % ophthalmic suspension Administer 1 Drop to both eyes two (2) times a day.  gabapentin (NEURONTIN) 100 mg capsule Take 3 Caps by mouth nightly as needed (rarely uses for leg pain).  DULoxetine (CYMBALTA) 30 mg capsule Take 30 mg by mouth daily.  cycloSPORINE (RESTASIS) 0.05 % ophthalmic emulsion Administer 1 Drop to both eyes two (2) times a day.  therapeutic multivitamin (THERAGRAN) tablet Take 1 Tab by mouth daily.  aspirin delayed-release 81 mg tablet Take 81 mg by mouth daily.  loperamide (IMODIUM) 2 mg capsule Take  by mouth as needed.  cyanocobalamin (VITAMIN B-12) 1,000 mcg tablet Take 1,000 mcg by mouth daily. No current facility-administered medications for this visit.         PAST MEDICAL HISTORY  Past Medical History:   Diagnosis Date    Arthritis     Asthma     had it while she smoked cigaretts    Autoimmune disease ([facility] [address] 2004    pt has MS    Burning with urination     CAD (coronary artery disease) 8/7/2012    Chronic pain     Depression     Dyspepsia and other specified disorders of function of stomach     GERD (gastroesophageal reflux disease)     Glaucoma     Glaucoma 8/7/2012    Hx of migraine headaches 8/7/2012    Hyperlipidemia 8/7/2012    Hypertension     Hypothyroidism 8/7/2012    IFG (impaired fasting glucose) 8/7/2012    Lymphocytic colitis 8/7/2012    Morbid obesity ([address]     Multiple sclerosis ([facility] [address]     Normal cardiac stress test 8/7/2012    Osteoarthritis 8/7/2012    Other ill-defined conditions(799.89)     COLITIS    Psoriasis 8/7/2012    Psychiatric disorder     anexity depression    Renal stones 8/7/2012    Sunburn, blistering     Thyroid disease        FAMILY HISTORY  family history includes Bipolar Disorder in her father; Breast Cancer (age of onset: 43) in her mother; COPD in her mother; Cancer in her sister; Coronary Artery Disease in her father, maternal grandfather, and maternal grandmother; Heart Disease in her sister; Hypertension in her father; Lung Disease in her mother. No family history of psoriasis or rheumatoid arthritis. SOCIAL HISTORY  She  reports that she quit smoking about 14 years ago. She has never used smokeless tobacco. She reports current alcohol use. She reports current drug use. Drug: Marijuana. Social History     Social History Narrative    Lives with daughter   Uses medical marijuana for glaucoma and joint pain. DATA  Visit Vitals  BP (!) 124/55 (BP 1 Location: Right arm, BP Patient Position: Sitting)   Pulse 70   Temp 97.3 °F (36.3 °C) (Oral)   Resp 20   Ht 5' 3\" (1.6 m)   Wt 219 lb (99.3 kg)   SpO2 95%   BMI 38.79 kg/m²    Body mass index is 38.79 kg/m². No flowsheet data found. General:  The patient is well developed, well nourished, alert, and in no apparent distress. Eyes: Sclera are anicteric. Trace bilateral conjunctival injection with decreased tear meniscus. Bilateral lens replacements. HEENT:  Denuded mucosa along lower buccal border of mandibular gumline bilaterally. Adequate salivary pooling. No cervical or supraclavicular lymphadenopathy. Lungs:  Clear to auscultation bilaterally, without wheeze or stridor. Normal respiratory effort. Cor:  Regular rate and rhythm. No murmur rub or gallop. Abdomen: Soft, non-tender, without hepatomegaly or masses. Extremities: Cyanosis of both feet, 2s cap refill of distal toes. DP pulses faintly palpable. No calf tenderness or edema. Warm and well perfused. Skin: No alopecia. Bilateral remote-appearing TKA scars. Few actinic keratoses and seborrheic dermatoses on LE's, no appreciable psoriaform lesions. Neuro: Left-sided strength 4/5 in upper and lower, +relative foot drop of left ankle. Sensation intact to light touch in distal extremities. Musculoskeletal:    A comprehensive musculoskeletal exam was performed for all joints of each upper and lower extremity and assessed for swelling, tenderness and range of motion. Results are documented as below:  Midline tenderness ~L5/S1 with adjacent paraspinous tenderness bilaterally. Left midfoot bony tenderness without warmth or erythema. No evidence of synovitis in the small joints of the hands, wrists, shoulders, elbows, hips, knees or ankles. Labs:  2020: RF negative, ESR 47, CRP 2mg/L, direct DEVIN negative, negative HLA-B27. CMP, CBC WNL    Imagin20 XR Lspine, reviewed personally, on my review decrease in anterior height of T11; Radiology read: Accentuated lumbar lordosis. Lower lumbar facet arthropathy. No compression fracture  20 XR feet:  Bilateral second metatarsal AVN. Bilateral foot diffuse osteopenia.  Inactive or indolent arthritis at the left navicula-medial cuneiform  Articulation evidenced by corticated erosions of navicular-cuneiform articulation  20 XR hands: reviewed personally, chondrocalcinosis of the wrist, no erosions  2019 DXA:  Fractures identified on Lateral scanogram: Possible mild T11 wedge deformity  Femoral Neck Left:  Bone mineral density (gm/cm2): 0.700  % of peak bone mass: 67  % for age matched controls: 80  T-score: -2.4  Z-score: -1.2  Femoral Neck Right:  Bone mineral density (gm/cm2): 0.711  % of peak bone mass: 68  % for age matched controls:  80  T-score: -2.4  Z-score: -1.1  Total Hip Left:  Bone mineral density (gm/cm2): 0.641  % of peak bone mass: 64  % for age matched controls: 72  T-score: -2.9  Z-score: -1.9  Total Hip Right:  Bone mineral density (gm/cm2): 0.737  % of peak bone mass: 73  % for age matched controls:  80  T-score: -2.1  Z-score: -1.2  Lumbar Spine: L1-4   Bone mineral density (gm/cm2): 1.099  % of peak bone mass: 92  % for age matched controls: 80  T-score: -0.8  Z-score: -0.1  33% Radius Left:  Bone mineral density (gm/cm2): 0.814  % of peak bone mass: 92  % for age matched controls:  80  T-score: -0.8  Z-score: 1.5    ASSESSMENT AND PLAN  Ms. Nima Rausch is a 67 y.o. female with history of psoriasis and possibly ulcerative colitis, who presents for evaluation of polyarticular arthralgia, on a background of advanced osteoporosis, poor distal circulation, AVN of the feet, and chondrocalcinosis with midfoot erosions. Possible the erosions reflect chondrocalcinosis alone, and she does not appear actively inflamed today, but will round out RA workup with CCP antibody screen. 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 We reviewed treatment of her progressive osteoporosis despite 2 years of Reclast, and she is amenable to daily injections of teriparatide. Will apply for Forteo, and update vitamin D and PTH levels. Forteo has shown promise in improving back pain, and she remains at very high risk for fractures with ongoing falls at home.     Her peripheral cyanosis presumably related to peripheral vascular disease may have predisposed her to AVN in the feet, though she is resistant to aggressive intervention and focal stenotic lesions amenable to stenting seem unlikely. She agrees to explore this further with her PCP. She will continue to pursue management of her desquamating mucosal lesions with her dentist. Her DEVIN is reassuring this is not lupus-related; she may have an alternative autoimmune  such as lichen planus or mucous membrane pemphigoid for which a biopsy may be needed. Chief Complaint   Patient presents with    Arthritis     left foot and back   . 1. Age-related osteoporosis without current pathological fracture- xray is equivocal, possible anterior wedge compression at T11. Declining density after 2 years of Reclast, high risk for fracture with recurring falls. - Forteo  - CYCLIC CITRUL PEPTIDE AB, IGG; Future  - PTH INTACT; Future  - C REACTIVE PROTEIN, QT; Future  - CBC WITH AUTOMATED DIFF; Future  - METABOLIC PANEL, COMPREHENSIVE; Future  - SED RATE (ESR); Future  - MAGNESIUM; Future  - VITAMIN D, 25 HYDROXY; Future    2. Pseudogout involving multiple joints  - CYCLIC CITRUL PEPTIDE AB, IGG; Future  - PTH INTACT; Future  - C REACTIVE PROTEIN, QT; Future  - METABOLIC PANEL, COMPREHENSIVE; Future  - SED RATE (ESR); Future  - MAGNESIUM; Future    3. Lumbar facet arthropathy  - REFERRAL TO PAIN MANAGEMENT    Orders Placed This Encounter    CYCLIC CITRUL PEPTIDE AB, IGG    PTH INTACT    C REACTIVE PROTEIN, QT    CBC WITH AUTOMATED DIFF    METABOLIC PANEL, COMPREHENSIVE    SED RATE (ESR)    MAGNESIUM    VITAMIN D, 25 HYDROXY    REFERRAL TO PAIN MANAGEMENT     Patient Instructions   1. Pain consult for low back injections, call [phone] if you'd like to schedule with Dr. Tiffany Ramsay, if your insurance is accepted there. 2. Labs at your earliest convenience from [facility].    3. Given your bone density was still declining with the Reclast, I'll see if we can get approval for Forteo injections.  If you don't hear from our pharmacy in the next 2 weeks, call or message me so I can see where the holdup is. 4. I don't think you have psoriatic arthritis; more likely you have pseudogout affecting the midfoot, and pain related to poor blood flow in the feet. 5. Talk to your PCP about the value and timing of working up peripheral vascular disease as a cause of your avascular necrosis. 6. Return in 3 months. Medications: I am having Mimi Oliva maintain her aspirin delayed-release, loperamide, cyanocobalamin, cycloSPORINE, therapeutic multivitamin, DULoxetine, brinzolamide, gabapentin, naproxen, latanoprost, timolol, buPROPion XL, levothyroxine, cloNIDine HCL, triamcinolone acetonide, simvastatin, dexAMETHasone, and losartan-hydroCHLOROthiazide.     Follow up: 3 months    Tonie Grewal MD    Adult Rheumatology   [facility]  A Part of [facility], [address] [place] [address] Road   Phone [phone]  Fax [phone]

## 2020-11-30 RX ORDER — DEXAMETHASONE 0.5 MG/5ML
ELIXIR ORAL
Qty: 237 ML | Refills: 1 | Status: SHIPPED | OUTPATIENT
Start: 2020-11-30 | End: 2021-01-05 | Stop reason: SDUPTHER

## 2020-12-10 LAB
25(OH)D3+25(OH)D2 SERPL-MCNC: 50.3 NG/ML (ref 30–100)
ALBUMIN SERPL-MCNC: 4.1 G/DL (ref 3.7–4.7)
ALBUMIN/GLOB SERPL: 1.5 {RATIO} (ref 1.2–2.2)
ALP SERPL-CCNC: 64 IU/L (ref 39–117)
ALT SERPL-CCNC: 18 IU/L (ref 0–32)
AST SERPL-CCNC: 20 IU/L (ref 0–40)
BASOPHILS # BLD AUTO: 0 X10E3/UL (ref 0–0.2)
BASOPHILS NFR BLD AUTO: 0 %
BILIRUB SERPL-MCNC: 0.4 MG/DL (ref 0–1.2)
BUN SERPL-MCNC: 27 MG/DL (ref 8–27)
BUN/CREAT SERPL: 36 (ref 12–28)
CALCIUM SERPL-MCNC: 10.2 MG/DL (ref 8.7–10.3)
CCP IGA+IGG SERPL IA-ACNC: 5 UNITS (ref 0–19)
CHLORIDE SERPL-SCNC: 101 MMOL/L (ref 96–106)
CO2 SERPL-SCNC: 21 MMOL/L (ref 20–29)
CREAT SERPL-MCNC: 0.76 MG/DL (ref 0.57–1)
CRP SERPL-MCNC: 1 MG/L (ref 0–10)
EOSINOPHIL # BLD AUTO: 0.2 X10E3/UL (ref 0–0.4)
EOSINOPHIL NFR BLD AUTO: 2 %
ERYTHROCYTE [DISTWIDTH] IN BLOOD BY AUTOMATED COUNT: 12.9 % (ref 11.7–15.4)
ERYTHROCYTE [SEDIMENTATION RATE] IN BLOOD BY WESTERGREN METHOD: 32 MM/HR (ref 0–40)
GLOBULIN SER CALC-MCNC: 2.7 G/DL (ref 1.5–4.5)
GLUCOSE SERPL-MCNC: 96 MG/DL (ref 65–99)
HCT VFR BLD AUTO: 46.7 % (ref 34–46.6)
HGB BLD-MCNC: 15.7 G/DL (ref 11.1–15.9)
IMM GRANULOCYTES # BLD AUTO: 0 X10E3/UL (ref 0–0.1)
IMM GRANULOCYTES NFR BLD AUTO: 0 %
LYMPHOCYTES # BLD AUTO: 1.3 X10E3/UL (ref 0.7–3.1)
LYMPHOCYTES NFR BLD AUTO: 13 %
MAGNESIUM SERPL-MCNC: 2.3 MG/DL (ref 1.6–2.3)
MCH RBC QN AUTO: 30.5 PG (ref 26.6–33)
MCHC RBC AUTO-ENTMCNC: 33.6 G/DL (ref 31.5–35.7)
MCV RBC AUTO: 91 FL (ref 79–97)
MONOCYTES # BLD AUTO: 1.1 X10E3/UL (ref 0.1–0.9)
MONOCYTES NFR BLD AUTO: 11 %
NEUTROPHILS # BLD AUTO: 7.4 X10E3/UL (ref 1.4–7)
NEUTROPHILS NFR BLD AUTO: 74 %
PLATELET # BLD AUTO: 248 X10E3/UL (ref 150–450)
POTASSIUM SERPL-SCNC: 4.4 MMOL/L (ref 3.5–5.2)
PROT SERPL-MCNC: 6.8 G/DL (ref 6–8.5)
PTH-INTACT SERPL-MCNC: NORMAL PG/ML
RBC # BLD AUTO: 5.14 X10E6/UL (ref 3.77–5.28)
SODIUM SERPL-SCNC: 142 MMOL/L (ref 134–144)
WBC # BLD AUTO: 10 X10E3/UL (ref 3.4–10.8)

## 2020-12-18 RX ORDER — LEVOTHYROXINE SODIUM 112 UG/1
TABLET ORAL
Qty: 90 TAB | Refills: 3 | Status: SHIPPED | OUTPATIENT
Start: 2020-12-18 | End: 2021-12-16 | Stop reason: SDUPTHER

## 2021-01-05 ENCOUNTER — OFFICE VISIT (OUTPATIENT)
Dept: FAMILY MEDICINE CLINIC | Age: 76
End: 2021-01-05
Payer: MEDICARE

## 2021-01-05 VITALS
BODY MASS INDEX: 39.97 KG/M2 | SYSTOLIC BLOOD PRESSURE: 131 MMHG | HEIGHT: 63 IN | HEART RATE: 69 BPM | TEMPERATURE: 97.8 F | OXYGEN SATURATION: 96 % | RESPIRATION RATE: 19 BRPM | DIASTOLIC BLOOD PRESSURE: 81 MMHG | WEIGHT: 225.6 LBS

## 2021-01-05 DIAGNOSIS — M19.90 INFLAMMATORY ARTHRITIS: ICD-10-CM

## 2021-01-05 DIAGNOSIS — F33.0 MILD EPISODE OF RECURRENT MAJOR DEPRESSIVE DISORDER (HCC): ICD-10-CM

## 2021-01-05 DIAGNOSIS — Z12.31 ENCOUNTER FOR SCREENING MAMMOGRAM FOR BREAST CANCER: ICD-10-CM

## 2021-01-05 DIAGNOSIS — G35 MS (MULTIPLE SCLEROSIS) (HCC): ICD-10-CM

## 2021-01-05 DIAGNOSIS — E66.01 SEVERE OBESITY (BMI 35.0-39.9) WITH COMORBIDITY (HCC): ICD-10-CM

## 2021-01-05 DIAGNOSIS — L43.8 EROSIVE ORAL LICHEN PLANUS: ICD-10-CM

## 2021-01-05 DIAGNOSIS — K12.0 RECURRENT APHTHOUS ULCER: ICD-10-CM

## 2021-01-05 DIAGNOSIS — I10 ESSENTIAL HYPERTENSION: ICD-10-CM

## 2021-01-05 DIAGNOSIS — Z00.00 MEDICARE ANNUAL WELLNESS VISIT, SUBSEQUENT: Primary | ICD-10-CM

## 2021-01-05 DIAGNOSIS — E78.5 HYPERLIPIDEMIA, UNSPECIFIED HYPERLIPIDEMIA TYPE: ICD-10-CM

## 2021-01-05 PROCEDURE — 3288F FALL RISK ASSESSMENT DOCD: CPT | Performed by: FAMILY MEDICINE

## 2021-01-05 PROCEDURE — G0439 PPPS, SUBSEQ VISIT: HCPCS | Performed by: FAMILY MEDICINE

## 2021-01-05 PROCEDURE — G8752 SYS BP LESS 140: HCPCS | Performed by: FAMILY MEDICINE

## 2021-01-05 PROCEDURE — 1090F PRES/ABSN URINE INCON ASSESS: CPT | Performed by: FAMILY MEDICINE

## 2021-01-05 PROCEDURE — G9717 DOC PT DX DEP/BP F/U NT REQ: HCPCS | Performed by: FAMILY MEDICINE

## 2021-01-05 PROCEDURE — 99214 OFFICE O/P EST MOD 30 MIN: CPT | Performed by: FAMILY MEDICINE

## 2021-01-05 PROCEDURE — G8754 DIAS BP LESS 90: HCPCS | Performed by: FAMILY MEDICINE

## 2021-01-05 PROCEDURE — G8536 NO DOC ELDER MAL SCRN: HCPCS | Performed by: FAMILY MEDICINE

## 2021-01-05 PROCEDURE — G8427 DOCREV CUR MEDS BY ELIG CLIN: HCPCS | Performed by: FAMILY MEDICINE

## 2021-01-05 PROCEDURE — G8417 CALC BMI ABV UP PARAM F/U: HCPCS | Performed by: FAMILY MEDICINE

## 2021-01-05 PROCEDURE — 1100F PTFALLS ASSESS-DOCD GE2>/YR: CPT | Performed by: FAMILY MEDICINE

## 2021-01-05 RX ORDER — DEXAMETHASONE 0.5 MG/5ML
ELIXIR ORAL
Qty: 237 ML | Refills: 1 | Status: CANCELLED | OUTPATIENT
Start: 2021-01-05

## 2021-01-05 RX ORDER — DEXAMETHASONE 0.5 MG/5ML
ELIXIR ORAL
Qty: 237 ML | Refills: 4 | Status: SHIPPED | OUTPATIENT
Start: 2021-01-05

## 2021-01-05 NOTE — PATIENT INSTRUCTIONS
I suggest you follow up with Dr. Tianna Garcia here         Well Visit, Over 72: Care Instructions  Your Care Instructions     Physical exams can help you stay healthy. Your doctor has checked your overall health and may have suggested ways to take good care of yourself. He or she also may have recommended tests. At home, you can help prevent illness with healthy eating, regular exercise, and other steps. Follow-up care is a key part of your treatment and safety. Be sure to make and go to all appointments, and call your doctor if you are having problems. It's also a good idea to know your test results and keep a list of the medicines you take. How can you care for yourself at home? · Reach and stay at a healthy weight. This will lower your risk for many problems, such as obesity, diabetes, heart disease, and high blood pressure. · Get at least 30 minutes of exercise on most days of the week. Walking is a good choice. You also may want to do other activities, such as running, swimming, cycling, or playing tennis or team sports. · Do not smoke. Smoking can make health problems worse. If you need help quitting, talk to your doctor about stop-smoking programs and medicines. These can increase your chances of quitting for good. · Protect your skin from too much sun. When you're outdoors from 10 a.m. to 4 p.m., stay in the shade or cover up with clothing and a hat with a wide brim. Wear sunglasses that block UV rays. Even when it's cloudy, put broad-spectrum sunscreen (SPF 30 or higher) on any exposed skin. · See a dentist one or two times a year for checkups and to have your teeth cleaned. · Wear a seat belt in the car. Follow your doctor's advice about when to have certain tests. These tests can spot problems early. For men and women  · Cholesterol.  Your doctor will tell you how often to have this done based on your overall health and other things that can increase your risk for heart attack and stroke. · Blood pressure. Have your blood pressure checked during a routine doctor visit. Your doctor will tell you how often to check your blood pressure based on your age, your blood pressure results, and other factors. · Diabetes. Ask your doctor whether you should have tests for diabetes. · Vision. Experts recommend that you have yearly exams for glaucoma and other age-related eye problems. · Hearing. Tell your doctor if you notice any change in your hearing. You can have tests to find out how well you hear. · Colon cancer tests. Keep having colon cancer tests as your doctor recommends. You can have one of several types of tests. · Heart attack and stroke risk. At least every 4 to 6 years, you should have your risk for heart attack and stroke assessed. Your doctor uses factors such as your age, blood pressure, cholesterol, and whether you smoke or have diabetes to show what your risk for a heart attack or stroke is over the next 10 years. · Osteoporosis. Talk to your doctor about whether you should have a bone density test to find out whether you have thinning bones. Ask your doctor if you need to take a calcium plus vitamin D supplement. You may be able to get enough calcium and vitamin D through your diet. For women  · Pap test and pelvic exam. You may no longer need a Pap test. Talk with your doctor about whether to stop or continue to have Pap tests. · Breast exam and mammogram. Ask how often you should have a mammogram, which is an X-ray of your breasts. A mammogram can spot breast cancer before it can be felt and when it is easiest to treat. · Thyroid disease. Talk to your doctor about whether to have your thyroid checked as part of a regular physical exam. Women have an increased chance of a thyroid problem. For men  · Prostate exam. Talk to your doctor about whether you should have a blood test (called a PSA test) for prostate cancer.  Experts recommend that you discuss the benefits and risks of the test with your doctor before you decide whether to have this test. Some experts say that men ages 79 and older no longer need testing. · Abdominal aortic aneurysm. Ask your doctor whether you should have a test to check for an aneurysm. You may need a test if you ever smoked or if your parent, brother, sister, or child has had an aneurysm. When should you call for help? Watch closely for changes in your health, and be sure to contact your doctor if you have any problems or symptoms that concern you. Where can you learn more? Go to http://www.gray.com/  Enter [id number] in the search box to learn more about \"Well Visit, Over 65: Care Instructions. \"  Current as of: May 27, 2020               Content Version: 12.6  © 5753-6549 [employer]. Care instructions adapted under license by [employer] (which disclaims liability or warranty for this information). If you have questions about a medical condition or this instruction, always ask your healthcare professional. [place] [address] any warranty or liability for your use of this information.

## 2021-01-05 NOTE — PROGRESS NOTES
Lewis Hernández [facility]  [place], [address]  [phone]           Date of visit: 1/5/21       This is an Subsequent Medicare Annual Wellness Visit (AWV), (Performed more than 12 months after effective date of Medicare Part B enrollment and 12 months after last preventive visit)    I have reviewed the patient's medical history in detail and updated the computerized patient record. Johny Longoria is a 67 y.o. female   History obtained from: the patient. Concerns today   (Patient understands that medical problems addressed today may incur additional cost as this is a preventive visit)  -feeling well overall    Osteoporosis on Reclast for 2 years and rheumatologist Dr. Ellen Mcfadden recently added Forteo; however still waiting for insurance approval  Has appt for 3rd Reclast in Feb    Rheum was concerned she could have PVD and she is willing to do check for it  Smoked 1ppd age 12 to 60    Rheum recommended consult with pain management Dr. Stephanie Mitchell for back injections     He did not think the oral sores were auto-immune, possibly lichen planus or bullous pemphigoid   It has responded well to the dexamethasone (swish and spit out) as needed, uses it intermittently.   Avoiding salty/crunchy foods    He thought psoriatic arthritis possible but more likely pseudogout in her feet  Hurts in back, feet, wrists, hands  Worse with rain  Uses naproxen once in a while but not often; aware it is not very good for her heart    Main pain is with standing, hurts in feet, low back  Not numbness/tingling down legs  Has tried PT 3x    Does use gabaapentin 100mg occasionally when legs are jumping, once monthly    Hypothyroidism, remembers her med; level ok in Sept     HTN on losartan, hctz, clonidine and doing well    HLD on zocor and lipids ok in Sept     Does have history of depression and anxiety   Uses marijuana nightly for insomnia and RLS doing well    MS stable, sees neuro regularly  No relapse in a year and not on a med now due to age and feels better off it     Was in hospital last year with mild rectal bleeding   Does have occasional colitis     Obesity stable; weight in Sept was 221, now 225    History     Patient Active Problem List   Diagnosis Code    HTN (hypertension) I10    Reflux [id number]    Multiple joint pain M25.50    Arthritis M19.90    Urine, incontinence, stress female N39.3    MS (multiple sclerosis) ([address] G35    Urinary retention R33.9    CAD (coronary artery disease) I25.10    Hyperlipidemia E78.5    Hypothyroidism E03.9    Lymphocytic colitis K52.832    Renal stones N20.0    Osteoarthritis M19.90    Osteoporosis M81.0    Hx of migraine headaches Z86.69    Psoriasis L40.9    Glaucoma H40.9    Left-sided weakness R53.1    Chronic edema R60.9    Severe obesity (BMI 35.0-39. 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    Functional incontinence R39.81    Marijuana use F12.90    Mild episode of recurrent major depressive disorder (HCC) F33.0    Erosive oral lichen planus D77.0     Past Medical History:   Diagnosis Date    Arthritis     Asthma     had it while she smoked cigaretts    Autoimmune disease ([address] 2004    pt has MS    Burning with urination     CAD (coronary artery disease) 8/7/2012    Chronic pain     Depression     Dyspepsia and other specified disorders of function of stomach     GERD (gastroesophageal reflux disease)     Glaucoma     Glaucoma 8/7/2012    Hx of migraine headaches 8/7/2012    Hyperlipidemia 8/7/2012    Hypertension     Hypothyroidism 8/7/2012    IFG (impaired fasting glucose) 8/7/2012    Lymphocytic colitis 8/7/2012    Morbid obesity ([address]     Multiple sclerosis ([address]     Normal cardiac stress test 8/7/2012    Osteoarthritis 8/7/2012    Other ill-defined conditions(799.89)     COLITIS    Psoriasis 8/7/2012    Psychiatric disorder anexity depression    Renal stones 8/7/2012    Sunburn, blistering     Thyroid disease       Past Surgical History:   Procedure Laterality Date    HX APPENDECTOMY      and lysis of adhesions    HX BREAST BIOPSY Right     many yrs ago; neg    HX CATARACT REMOVAL      both    HX CHOLECYSTECTOMY  1994    lap    HX KNEE REPLACEMENT      both    HX LYSIS OF ADHESIONS      HX MOHS PROCEDURES  04/19/2017    BCC left infraorbital cheek by Dr. Pavel Winkler UNLISTED      cyst removal right breast     Allergies   Allergen Reactions    Adhes. Band-Tape-Benzalkonium Rash    Alphagan P [Brimonidine] Other (comments)     Severe reaction and damage to the cornea     Current Outpatient Medications   Medication Sig Dispense Refill    dexAMETHasone (DECADRON) 0.5 mg/5 mL elixir SWISH/SPIT 5ML 4X DAILY prn mouth ulcers 237 mL 4    simvastatin (ZOCOR) 40 mg tablet TAKE 1 TABLET BY MOUTH EVERY DAY IN THE EVENING 90 Tab 3    levothyroxine (SYNTHROID) 112 mcg tablet TAKE 1 TABLET BY MOUTH EVERY DAY BEFORE BREAKFAST 90 Tab 3    buPROPion XL (WELLBUTRIN XL) 150 mg tablet TAKE 1 TABLET BY MOUTH EVERY DAY IN THE MORNING 90 Tab 1    losartan-hydroCHLOROthiazide (HYZAAR) 100-25 mg per tablet TAKE 1 TABLET BY MOUTH DAILY FOR BLOOD PRESSURE 90 Tab 1    cloNIDine HCL (CATAPRES) 0.1 mg tablet TAKE 1 TABLET BY MOUTH NIGHTLY. *HOLD IF SBP IS LESS THAN OR EQUAL * 90 Tab 0    latanoprost (XALATAN) 0.005 % ophthalmic solution Administer 1 Drop to both eyes daily.  timolol (TIMOPTIC) 0.5 % ophthalmic solution INSTILL 1 DROP INTO INTO BOTH EYES TWICE A DAY  3    naproxen (NAPROSYN) 500 mg tablet TAKE ONE TABLET BY MOUTH TWICE DAILY AS NEEDED 60 Tab 1    brinzolamide (AZOPT) 1 % ophthalmic suspension Administer 1 Drop to both eyes two (2) times a day.       DULoxetine (CYMBALTA) 30 mg capsule Take 30 mg by mouth daily.  11    cycloSPORINE (RESTASIS) 0.05 % ophthalmic emulsion Administer 1 Drop to both eyes two (2) times a day.  therapeutic multivitamin (THERAGRAN) tablet Take 1 Tab by mouth daily.  aspirin delayed-release 81 mg tablet Take 81 mg by mouth daily.  cyanocobalamin (VITAMIN B-12) 1,000 mcg tablet Take 1,000 mcg by mouth daily.  teriparatide (Forteo) 20 mcg/dose (600mcg/2.4mL) pnij injection 0.08 mL by SubCUTAneous route daily. 1 Syringe 6     Family History   Problem Relation Age of Onset   [address] [facility] COPD Mother    [facility] Lung Disease Mother     Breast Cancer Mother 43    Coronary Artery Disease Father     Bipolar Disorder Father     Hypertension Father     Heart Disease Sister         A fib    Cancer Sister         pancreatic     Coronary Artery Disease Maternal Grandmother     Coronary Artery Disease Maternal Grandfather      Social History     Tobacco Use    Smoking status: Former Smoker     Quit date: 2006     Years since quittin.3    Smokeless tobacco: Never Used   Substance Use Topics    Alcohol use: Not Currently     Comment: 1 DRINK/MO       Specialists/Care Team   Funmi Frank has established care with the following healthcare providers:  Patient Care Team:  Neil Harrison MD as PCP - General (Family Medicine)  Neil Harrison MD [facility] P[facility]  Corrina Jacques MD (Cardiology)  Carleen Núñez MD (Neurology)  Bella Rivers NP (Dermatology)  Shana Nolen MD (Ophthalmology)  Taina Krueger MD (Rheumatology)    Health Risk Assessment     Demographics   female  67 y.o. General Health Questions   -During the past 4 weeks:   -how would you rate your health in general? Fair   -how often have you been bothered by feeling dizzy when standing up? Often for years due to [address]   -how much have you been bothered by bodily pain?  Lots in many joints, recently saw rheumatologist   -Have you noticed any hearing difficulties? no   -has your physical and emotional health limited your social activities with family or friends? yes    Emotional Health Questions   -Do you have a history of depression, anxiety, or emotional problems? yes    3 most recent PHQ Screens 1/5/2021   Little interest or pleasure in doing things Not at all   Feeling down, depressed, irritable, or hopeless Not at all   Total Score PHQ 2 0        Health Habits   Please describe your diet habits: lots of soups, working on more veggies  Do you get 5 servings of fruits or vegetables daily? no  Do you exercise regularly? No, encouraged chair exercise, says she stays in bed more than she should because she feels better when laying down    Activities of Daily Living and Functional Status   -Do you need help with eating, walking, dressing, bathing, toileting, the phone, transportation, shopping, preparing meals, housework, laundry, medications or managing money? Daughter helps her change the bed, vacuum and do some other cleaning.  -In the past four weeks, was someone available to help you if you needed and wanted help with anything? Yes, daughter and granddaughter  -Are you confident are you that you can control and manage most of your health problems? yes  -Have you been given information to help you keep track of your medications? yes  -How often do you have trouble taking your medications as prescribed? never    Fall Risk and Home Safety   Have you fallen 2 or more times in the past year? yes  Does your home have rugs in the hallway, lack grab bars in the bathroom, lack handrails on the stairs or have poor lighting? no  Do you have smoke detectors and check them regularly? yes  Do you have difficulties driving a car? Not usually but if feeling dizzy doesn't drive  Do you always fasten your seat belt when you are in a car? yes    Review of Systems (if indicated for problems addressed today)   Gen: denies fever  pulm denies cough        Physical Examination     Vitals:    01/05/21 1612  BP: 131/81   Pulse: 69   Resp: 19   Temp: 97.8 °F (36.6 °C)   TempSrc: Temporal   SpO2: 96%   Weight: 225 lb 9.6 oz (102.3 kg)   Height: 5' 3\" (1.6 m)     Body mass index is 39.96 kg/m². No exam data present  Was the patient's timed Up & Go test unsteady or longer than 30 seconds? yes    Evaluation of Cognitive Function   Mood/affect:  happy  Orientation: normal  Appearance: age appropriate  Family member/caregiver input: none    Additional exam if indicated for problems addressed today:  General: stated age, obese and in NAD  Mouth: inner cheeks with white lacy patches, no erosions seen, no sign of thrush  Neck: supple, symmetrical, trachea midline, no adenopathy and thyroid: not enlarged, symmetric, no tenderness/mass/nodules  Lungs:  clear to auscultation w/o rales, rhonchi, wheezes w/normal effort and no use of accessory muscles of respiration   Heart: regular rate and rhythm, S1, S2 normal, no murmur, click, rub or gallop  Abdomen: soft, nontender, no masses  Ext:  No edema noted.  Feet are cold and with slow cap refill 4-5 seconds and hard to feel DP pulses bilaterally  Lymph: no cervical adenopathy appreciated  Skin:  Normal. and no rash or abnormalities   Psych: alert and oriented to person, place, time and situation and Speech: appropriate quality, quantity and organization of sentences     Advice/Referrals/Counseling (as indicated)   Education and counseling provided for any problems identified above: diet    Preventive Services     Health Maintenance   Topic Date Due    DTaP/Tdap/Td series (2 - Tdap) 02/28/2017    Shingrix Vaccine Age 50> (2 of 2) 05/01/2019    Medicare Yearly Exam  12/31/2020    Lipid Screen  09/11/2021    GLAUCOMA SCREENING Q2Y  08/01/2022    Bone Densitometry (Dexa) Screening  Completed    Flu Vaccine  Completed    Pneumococcal 65+ years  Completed      (Preventive care checklist to be included in patient instructions)  Discussed today Done Previously Not Needed     X both Pneumococcal vaccines    x  Flu vaccine     x Hepatitis B vaccine (if at risk)   X will do #2 after pandemic X #1  Shingles vaccine   X plans after pandemic   TDAP vaccine    X 11/19  Mammogram     x Pap smear     x Colorectal cancer screening     x Low-dose CT for lung cancer screening    X 1/20 osteoporosis  Bone density test    x  Glaucoma screening    x  Cholesterol test    x  Diabetes screening test      x Diabetes self-management class     x Nutritionist referral for diabetes or renal disease     Discussion of Advance Directive   Discussed with Mimi Barragan her ability to prepare and advance directive in the case that an injury or illness causes her to be unable to make health care decisions. Has ACP on file, no changes    Assessment/Plan   Z00.00    ICD-10-CM ICD-9-CM    1. Medicare annual wellness visit, subsequent  Z00.00 V70.0    2. Essential hypertension  I10 401.9    3. MS (multiple sclerosis) ([facility]  G35 340    4. Hyperlipidemia, unspecified hyperlipidemia type  E78.5 272.4    5. Severe obesity (BMI 35.0-39. 9) with comorbidity ([facility]  E66.01 278.01    6. Mild episode of recurrent major depressive disorder (HCC)  F33.0 296.31    7. Inflammatory arthritis  M19.90 714.9    8. Recurrent aphthous ulcer  K12.0 528.2 DUPLEX LOWER EXT ARTERY BILAT   9. Erosive oral lichen planus  A23.1 697.0    10. Encounter for screening mammogram for breast cancer  Z12.31 V76.12 MIRANAD MAMMO BI SCREENING INCL CAD       Orders Placed This Encounter    MIRANDA MAMMO BI SCREENING INCL CAD    dexAMETHasone (DECADRON) 0.5 mg/5 mL elixir     Preventive as above  HTN controlled, no med changes  Not due for labs yet but should have them in 6 mo if not sooner with rheumatology  Will do arterial dopplers; she isn't smoking now but has a 45+pack-year history  Seems he made a good plan for her  Does look like she has oral lichen planus.  Encouraged her to avoid crunchy foods, be gentle with it, and use the swish/spit dexamethasone prn  Other chronic problems stable    Follow-up and Dispositions    · Return in about 6 months (around 7/5/2021) for Follow up.           Destiney Molina MD

## 2021-01-05 NOTE — PROGRESS NOTES
Chief Complaint   Patient presents with   [facility] Annual Wellness Visit       1. Have you been to the ER, urgent care clinic since your last visit? Hospitalized since your last visit? No    2. Have you seen or consulted any other health care providers outside of the [address] [place] [address] [place] since your last visit? Include any pap smears or colon screening. No    3 most recent PHQ Screens 1/5/2021   Little interest or pleasure in doing things Not at all   Feeling down, depressed, irritable, or hopeless Not at all   Total Score PHQ 2 0       Fall Risk Assessment, last 12 mths 1/5/2021   Able to walk? Yes   Fall in past 12 months? 1   Do you feel unsteady? 1   Are you worried about falling 1   Is TUG test greater than 12 seconds? 1   Is the gait abnormal? 1   Number of falls in past 12 months 2   Fall with injury? 0             ADL Assessment 1/5/2021   Feeding yourself No Help Needed   Getting from bed to chair No Help Needed   Getting dressed No Help Needed   Bathing or showering No Help Needed   Walk across the room (includes cane/walker) No Help Needed   Using the telphone No Help Needed   Taking your medications No Help Needed   Preparing meals No Help Needed   Managing money (expenses/bills) No Help Needed   Moderately strenuous housework (laundry) No Help Needed   Shopping for personal items (toiletries/medicines) No Help Needed   Shopping for groceries Help Needed   Driving No Help Needed   Climbing a flight of stairs Help Needed   Getting to places beyond walking distances No Help Needed       Abuse Screening Questionnaire 1/5/2021   Do you ever feel afraid of your partner? N   Are you in a relationship with someone who physically or mentally threatens you? N   Is it safe for you to go home?  Y       Health Maintenance Due   Topic Date Due    DTaP/Tdap/Td series (2 - Tdap) 02/28/2017    Shingrix Vaccine Age 50> (2 of 2) 05/01/2019    Medicare Yearly Exam  12/31/2020

## 2021-01-11 ENCOUNTER — HOSPITAL ENCOUNTER (OUTPATIENT)
Dept: VASCULAR SURGERY | Age: 76
Discharge: HOME | End: 2021-01-11
Attending: FAMILY MEDICINE
Payer: MEDICARE

## 2021-01-11 DIAGNOSIS — K12.0 RECURRENT APHTHOUS ULCER: ICD-10-CM

## 2021-01-11 PROCEDURE — 93922 UPR/L XTREMITY ART 2 LEVELS: CPT

## 2021-01-12 LAB
LEFT ABI: 0.99
LEFT ANTERIOR TIBIAL: 132 MMHG
LEFT ARM BP: 132 MMHG
LEFT POSTERIOR TIBIAL: 123 MMHG
LEFT TBI: 0.86
LEFT TOE PRESSURE: 115 MMHG
RIGHT ABI: 1.11
RIGHT ANTERIOR TIBIAL: 115 MMHG
RIGHT ARM BP: 134 MMHG
RIGHT POSTERIOR TIBIAL: 149 MMHG
RIGHT TBI: 0.57
RIGHT TOE PRESSURE: 76 MMHG

## 2021-01-12 NOTE — PROGRESS NOTES
Sent in letter: The studies of your leg arteries were good. The arteries in your legs are not blocked. Your right foot has less blood flow, but there is nothing we need to do about that. It is good news that it is not a problem further up your leg. I will let Dr. Fadia Martell know.

## 2021-02-11 ENCOUNTER — HOSPITAL ENCOUNTER (OUTPATIENT)
Dept: INFUSION THERAPY | Age: 76
End: 2021-02-11

## 2021-02-17 NOTE — PROGRESS NOTES
New/updated order required for patient's upcoming [facility] appointment. Faxed doctor's office on 02/17/21 at 2:44 PM.  Refer to fax documents in pharmacy for further information.     Stephen Solorzano, HansaD, BCPS

## 2021-02-22 ENCOUNTER — HOSPITAL ENCOUNTER (OUTPATIENT)
Dept: INFUSION THERAPY | Age: 76
Discharge: HOME | End: 2021-02-22

## 2021-02-22 ENCOUNTER — APPOINTMENT (OUTPATIENT)
Dept: INFUSION THERAPY | Age: 76
End: 2021-02-22

## 2021-02-26 NOTE — PROGRESS NOTES
New/updated order required for patient's upcoming [facility] appointment. Faxed doctor's office on 02/26/21 at 9:44 AM.  Refer to fax documents in pharmacy for further information.       Adelso Erwin, PharmD, BCPS

## 2021-03-02 ENCOUNTER — TELEPHONE (OUTPATIENT)
Dept: FAMILY MEDICINE CLINIC | Age: 76
End: 2021-03-02

## 2021-03-02 NOTE — TELEPHONE ENCOUNTER
Roosevelt Barragan called she is coming in tomorrow 03/03/2021 at 2:00 for reclasp and an order is required. Thi appointment is a once a year and patient should not postpone her appointment. Patient was advised to continue care with Dr. Roselyn Mcfadden, however patient has never scheduled an appointment.     Please fax the order    [phone] fax

## 2021-03-02 NOTE — TELEPHONE ENCOUNTER
Pt was last seen on 1/5/21 with DR Bonnie Douglass for AWV. Looks like pt is booked with the infusion center tomorrow. Called pt and scheduled an appt 3/31 with DR Kimberli Ansari. Pt reports that she rescheduled the reclast appt to 3/17.

## 2021-03-03 ENCOUNTER — HOSPITAL ENCOUNTER (OUTPATIENT)
Dept: INFUSION THERAPY | Age: 76
Discharge: HOME | End: 2021-03-03

## 2021-03-12 RX ORDER — SODIUM CHLORIDE 9 MG/ML
25 INJECTION, SOLUTION INTRAVENOUS CONTINUOUS
Status: DISCONTINUED | OUTPATIENT
Start: 2021-03-17 | End: 2021-03-18 | Stop reason: HOSPADM

## 2021-03-12 RX ORDER — ZOLEDRONIC ACID 5 MG/100ML
5 INJECTION, SOLUTION INTRAVENOUS ONCE
Status: DISCONTINUED | OUTPATIENT
Start: 2021-03-17 | End: 2021-03-17

## 2021-03-17 ENCOUNTER — HOSPITAL ENCOUNTER (OUTPATIENT)
Dept: INFUSION THERAPY | Age: 76
Discharge: HOME | End: 2021-03-17
Payer: MEDICARE

## 2021-03-17 VITALS
TEMPERATURE: 96.8 F | RESPIRATION RATE: 18 BRPM | DIASTOLIC BLOOD PRESSURE: 71 MMHG | SYSTOLIC BLOOD PRESSURE: 125 MMHG | HEART RATE: 77 BPM

## 2021-03-17 LAB
ALBUMIN SERPL-MCNC: 3.5 G/DL (ref 3.5–5)
ANION GAP SERPL CALC-SCNC: 4 MMOL/L (ref 5–15)
BUN SERPL-MCNC: 22 MG/DL (ref 6–20)
BUN/CREAT SERPL: 32 (ref 12–20)
CALCIUM SERPL-MCNC: 9.5 MG/DL (ref 8.5–10.1)
CHLORIDE SERPL-SCNC: 109 MMOL/L (ref 97–108)
CO2 SERPL-SCNC: 30 MMOL/L (ref 21–32)
CREAT SERPL-MCNC: 0.68 MG/DL (ref 0.55–1.02)
GLUCOSE SERPL-MCNC: 80 MG/DL (ref 65–100)
PHOSPHATE SERPL-MCNC: 2.9 MG/DL (ref 2.6–4.7)
POTASSIUM SERPL-SCNC: 3.9 MMOL/L (ref 3.5–5.1)
SODIUM SERPL-SCNC: 143 MMOL/L (ref 136–145)

## 2021-03-17 PROCEDURE — 36415 COLL VENOUS BLD VENIPUNCTURE: CPT

## 2021-03-17 PROCEDURE — 80069 RENAL FUNCTION PANEL: CPT

## 2021-03-17 NOTE — PROGRESS NOTES
[facility] VISIT NOTE    1700  Pt arrived at [facility] ambulatory via walker and in no distress for Reclast infusion. Notified Dr. Loren Ramirez office that pt had her first COVID vaccine this past Monday. Office states that pt has not had an appointment with Dr. Pricila Kelley yet, and that they would prefer that she sees him prior to her Reclast infusion. Pt notified and she verbalized understanding. Pt has an appointment with Dr. Pricila Kelley 3/31/21. Labs drawn peripherally and pt notified that they are good for 30 days. Blood pressure 125/71, pulse 77, temperature 96.8 °F (36 °C), resp. rate 18.    1715  D/C'd from [facility] ambulatory via walker and in no distress. Pt to reschedule Reclast appointment.

## 2021-03-31 ENCOUNTER — OFFICE VISIT (OUTPATIENT)
Dept: FAMILY MEDICINE CLINIC | Age: 76
End: 2021-03-31
Payer: MEDICARE

## 2021-03-31 VITALS
TEMPERATURE: 98.1 F | WEIGHT: 219.4 LBS | DIASTOLIC BLOOD PRESSURE: 84 MMHG | SYSTOLIC BLOOD PRESSURE: 132 MMHG | RESPIRATION RATE: 16 BRPM | OXYGEN SATURATION: 97 % | BODY MASS INDEX: 38.88 KG/M2 | HEART RATE: 70 BPM | HEIGHT: 63 IN

## 2021-03-31 DIAGNOSIS — G25.81 RLS (RESTLESS LEGS SYNDROME): ICD-10-CM

## 2021-03-31 DIAGNOSIS — Z87.19 HISTORY OF COLITIS: ICD-10-CM

## 2021-03-31 DIAGNOSIS — M54.41 CHRONIC BILATERAL LOW BACK PAIN WITH RIGHT-SIDED SCIATICA: ICD-10-CM

## 2021-03-31 DIAGNOSIS — E03.9 ACQUIRED HYPOTHYROIDISM: ICD-10-CM

## 2021-03-31 DIAGNOSIS — F33.0 MILD EPISODE OF RECURRENT MAJOR DEPRESSIVE DISORDER (HCC): ICD-10-CM

## 2021-03-31 DIAGNOSIS — G89.29 CHRONIC BILATERAL LOW BACK PAIN WITH RIGHT-SIDED SCIATICA: ICD-10-CM

## 2021-03-31 DIAGNOSIS — E66.01 SEVERE OBESITY (BMI 35.0-39.9) WITH COMORBIDITY (HCC): ICD-10-CM

## 2021-03-31 DIAGNOSIS — G35 MULTIPLE SCLEROSIS (HCC): ICD-10-CM

## 2021-03-31 DIAGNOSIS — M81.6 LOCALIZED OSTEOPOROSIS WITHOUT CURRENT PATHOLOGICAL FRACTURE: Primary | ICD-10-CM

## 2021-03-31 DIAGNOSIS — M25.50 MULTIPLE JOINT PAIN: ICD-10-CM

## 2021-03-31 DIAGNOSIS — I10 ESSENTIAL HYPERTENSION: ICD-10-CM

## 2021-03-31 DIAGNOSIS — E78.5 HYPERLIPIDEMIA, UNSPECIFIED HYPERLIPIDEMIA TYPE: ICD-10-CM

## 2021-03-31 DIAGNOSIS — I73.9 PAD (PERIPHERAL ARTERY DISEASE) (HCC): ICD-10-CM

## 2021-03-31 PROCEDURE — 99213 OFFICE O/P EST LOW 20 MIN: CPT | Performed by: FAMILY MEDICINE

## 2021-03-31 PROCEDURE — G9717 DOC PT DX DEP/BP F/U NT REQ: HCPCS | Performed by: FAMILY MEDICINE

## 2021-03-31 PROCEDURE — G8752 SYS BP LESS 140: HCPCS | Performed by: FAMILY MEDICINE

## 2021-03-31 PROCEDURE — G8536 NO DOC ELDER MAL SCRN: HCPCS | Performed by: FAMILY MEDICINE

## 2021-03-31 PROCEDURE — G8754 DIAS BP LESS 90: HCPCS | Performed by: FAMILY MEDICINE

## 2021-03-31 PROCEDURE — 1100F PTFALLS ASSESS-DOCD GE2>/YR: CPT | Performed by: FAMILY MEDICINE

## 2021-03-31 PROCEDURE — 3288F FALL RISK ASSESSMENT DOCD: CPT | Performed by: FAMILY MEDICINE

## 2021-03-31 PROCEDURE — 1090F PRES/ABSN URINE INCON ASSESS: CPT | Performed by: FAMILY MEDICINE

## 2021-03-31 PROCEDURE — G8427 DOCREV CUR MEDS BY ELIG CLIN: HCPCS | Performed by: FAMILY MEDICINE

## 2021-03-31 PROCEDURE — G8417 CALC BMI ABV UP PARAM F/U: HCPCS | Performed by: FAMILY MEDICINE

## 2021-03-31 NOTE — PROGRESS NOTES
Kay Barbosa  67 y.o. female  1945  [address] [id number]  [id number]     [address]       Encounter Date: 3/31/2021           Established Patient Visit Note: Florian Rosario MD    Reason for Appointment:  Chief Complaint   Patient presents with   Magalis.Lob Establish Care       History of Present Illness:  History provided by patient    Kay Barbosa is a 67 y.o. female who presents to clinic today for:    · Mouth Sores she reports that she has been to dentists, rheumatologists, and dermatology but no one seems to know what it is. She reports that it has been a little better since stopping naproxen. She has been using dexamethasone, which she reports as helpful. · She has had both COVID vaccines  · Osteoporosis: on reclast and followed by rheumatology  · Chronic Back Pain: She reports that she canceld apt with Dr. Mamadou Good d/t concern about COVID. She does plan to schedule this. · Concern for PVD: had LE dopplers that showed evidence of arterial disease at right foot or digit level, but otherwise no evidence of right lower or left lower extremity obstruction  · Multiple Joint Pain: has follow up with Rheumatology this week. Uses gabapentin when legs are jerking at night. · MS: followed by neurology (Dr. Fletcher Shoulders at [facility]), no recent exacerbations, not on any meds for MS at this time  · Hypothyroidism: taking daily as directed, last TSH nml in Sept, 2020  · HTN: doing well on losartan, HCTZ, and clondiine,  · HLD: doing well on zocor  · RLS: uses gabapentin PRN, uses marijuana PRN  · Anxiety and Depression: denies any SI; pandemic has made it a little more difficult with not being able to go out  · Hx of Colitis: reports occasional symtpoms about once per month for which she takes immodium; not interested in seeing GI    Review of Systems  Review of Systems   Constitutional: Negative for chills and fever.    Respiratory: Negative for cough, shortness of breath and wheezing. Cardiovascular: Negative for chest pain and palpitations. Allergies: Adhes. band-tape-benzalkonium and Alphagan p [brimonidine]    Medications: (Updated to reflect final medication list after visit)    Current Outpatient Medications:     dexAMETHasone (DECADRON) 0.5 mg/5 mL elixir, SWISH/SPIT 5ML 4X DAILY prn mouth ulcers, Disp: 237 mL, Rfl: 4    simvastatin (ZOCOR) 40 mg tablet, TAKE 1 TABLET BY MOUTH EVERY DAY IN THE EVENING, Disp: 90 Tab, Rfl: 3    levothyroxine (SYNTHROID) 112 mcg tablet, TAKE 1 TABLET BY MOUTH EVERY DAY BEFORE BREAKFAST, Disp: 90 Tab, Rfl: 3    buPROPion XL (WELLBUTRIN XL) 150 mg tablet, TAKE 1 TABLET BY MOUTH EVERY DAY IN THE MORNING, Disp: 90 Tab, Rfl: 1    teriparatide (Forteo) 20 mcg/dose (600mcg/2.4mL) pnij injection, 0.08 mL by SubCUTAneous route daily. , Disp: 1 Syringe, Rfl: 11    losartan-hydroCHLOROthiazide (HYZAAR) 100-25 mg per tablet, TAKE 1 TABLET BY MOUTH DAILY FOR BLOOD PRESSURE, Disp: 90 Tab, Rfl: 1    cloNIDine HCL (CATAPRES) 0.1 mg tablet, TAKE 1 TABLET BY MOUTH NIGHTLY. *HOLD IF SBP IS LESS THAN OR EQUAL *, Disp: 90 Tab, Rfl: 0    latanoprost (XALATAN) 0.005 % ophthalmic solution, Administer 1 Drop to both eyes daily. , Disp: , Rfl:     timolol (TIMOPTIC) 0.5 % ophthalmic solution, INSTILL 1 DROP INTO INTO BOTH EYES TWICE A DAY, Disp: , Rfl: 3    naproxen (NAPROSYN) 500 mg tablet, TAKE ONE TABLET BY MOUTH TWICE DAILY AS NEEDED (Patient taking differently: Take 220 mg by mouth two (2) times daily as needed.), Disp: 60 Tab, Rfl: 1    brinzolamide (AZOPT) 1 % ophthalmic suspension, Administer 1 Drop to both eyes two (2) times a day., Disp: , Rfl:     DULoxetine (CYMBALTA) 30 mg capsule, Take 30 mg by mouth daily. , Disp: , Rfl: 11    cycloSPORINE (RESTASIS) 0.05 % ophthalmic emulsion, Administer 1 Drop to both eyes two (2) times a day., Disp: , Rfl:     therapeutic multivitamin (THERAGRAN) tablet, Take 1 Tab by mouth daily. , Disp: , Rfl:   aspirin delayed-release 81 mg tablet, Take 81 mg by mouth daily. , Disp: , Rfl:     cyanocobalamin (VITAMIN B-12) 1,000 mcg tablet, Take 1,000 mcg by mouth daily. , Disp: , Rfl:     mometasone (ELOCON) 0.1 % topical cream, Apply  to affected area daily. , Disp: , Rfl:     History  Patient Care Team:  Kendra Ramirez MD as PCP - General (Family Medicine)  Kendra Ramirez MD as PCP - [facility]  Danette Arzate MD (Cardiology)  Nick Adan MD (Neurology)  Moi Ni NP (Dermatology)  Miguel Barrera MD (Rheumatology)    Past Medical History: she has a past medical history of Arthritis, Asthma, Autoimmune disease ([address] (2004), Burning with urination, CAD (coronary artery disease) (8/7/2012), Chronic pain, Depression, Dyspepsia and other specified disorders of function of stomach, GERD (gastroesophageal reflux disease), Glaucoma, Glaucoma (8/7/2012), migraine headaches (8/7/2012), Hyperlipidemia (8/7/2012), Hypertension, Hypothyroidism (8/7/2012), IFG (impaired fasting glucose) (8/7/2012), Lymphocytic colitis (8/7/2012), Morbid obesity ([address] Multiple sclerosis ([address] Normal cardiac stress test (8/7/2012), Osteoarthritis (8/7/2012), Other ill-defined conditions(799.89), Psoriasis (8/7/2012), Psychiatric disorder, Renal stones (8/7/2012), Sunburn, blistering, and Thyroid disease. She also has no past medical history of Anemia NEC, Arsenic suspected exposure, Family history of skin cancer, Radiation exposure, Skin cancer, Sleep apnea, Sun-damaged skin, or Tanning bed exposure. Past Surgical History: she has a past surgical history that includes hx tonsil and adenoidectomy; hx lysis of adhesions; hx knee replacement; pr breast surgery procedure unlisted; hx cataract removal; hx appendectomy; hx cholecystectomy (1994); hx orthopaedic; hx mohs procedure (04/19/2017); and hx breast biopsy (Right).     Family Medical History: family history includes Bipolar Disorder in her father; Breast Cancer (age of onset: 43) in her mother; COPD in her mother; Cancer in her sister; Coronary Artery Disease in her father, maternal grandfather, and maternal grandmother; Heart Disease in her sister; Hypertension in her father; Lung Disease in her mother. Social History: she reports that she quit smoking about 14 years ago. She has never used smokeless tobacco. She reports previous alcohol use. She reports current drug use. Drug: Marijuana. Health Maintenance Due   Topic Date Due    Hepatitis C Screening  Never done    DTaP/Tdap/Td series (2 - Tdap) 02/28/2017    Shingrix Vaccine Age 50> (2 of 2) 05/01/2019       Objective:   Visit Vitals  /84 (BP 1 Location: Left upper arm, BP Patient Position: Sitting)   Pulse 70   Temp 98.1 °F (36.7 °C) (Oral)   Resp 16   Ht 5' 3\" (1.6 m)   Wt 219 lb 6.4 oz (99.5 kg)   SpO2 97%   BMI 38.86 kg/m²     Wt Readings from Last 3 Encounters:   04/02/21 216 lb 6.4 oz (98.2 kg)   03/31/21 219 lb 6.4 oz (99.5 kg)   01/05/21 225 lb 9.6 oz (102.3 kg)       Physical Exam  Vitals signs and nursing note reviewed. Constitutional:       General: She is not in acute distress. Appearance: Normal appearance. HENT:      Head: Normocephalic and atraumatic. Nose: Nose normal.      Mouth/Throat:      Mouth: Mucous membranes are moist.   Eyes:      Extraocular Movements: Extraocular movements intact. Conjunctiva/sclera: Conjunctivae normal.      Pupils: Pupils are equal, round, and reactive to light. Neck:      Musculoskeletal: Normal range of motion and neck supple. No neck rigidity or muscular tenderness. Cardiovascular:      Rate and Rhythm: Normal rate and regular rhythm. Pulses: Normal pulses. Heart sounds: Normal heart sounds. No murmur. No friction rub. No gallop. Pulmonary:      Effort: Pulmonary effort is normal. No respiratory distress. Breath sounds: Normal breath sounds. No wheezing, rhonchi or rales.    Musculoskeletal: Normal range of motion. Lymphadenopathy:      Cervical: No cervical adenopathy. Skin:     General: Skin is warm. Coloration: Skin is not jaundiced. Neurological:      General: No focal deficit present. Mental Status: She is alert. Mental status is at baseline. Cranial Nerves: Cranial nerves are intact. Motor: Motor function is intact. Coordination: Coordination is intact. Psychiatric:         Mood and Affect: Mood normal.         Behavior: Behavior normal.         Thought Content: Thought content normal.         Judgment: Judgment normal.         Assessment & Plan:      ICD-10-CM ICD-9-CM    1. Localized osteoporosis without current pathological fracture  M81.6 733.09    2. Chronic bilateral low back pain with right-sided sciatica  M54.41 724.2     G89.29 724.3      338.29    3. Multiple joint pain  M25.50 719.49    4. Acquired hypothyroidism  E03.9 244.9 TSH 3RD GENERATION   5. Essential hypertension  I10 401.9    6. Multiple sclerosis ([facility]  G35 340    7. Mild episode of recurrent major depressive disorder ([facility])  F33.0 296.31    8. Severe obesity (BMI 35.0-39. 9) with comorbidity ([facility]  E66.01 278.01    9. Hyperlipidemia, unspecified hyperlipidemia type  E78.5 272.4    10. RLS (restless legs syndrome)  G25.81 333.94    11. History of colitis  Z87.19 V12.79    12. PAD (peripheral artery disease) ([facility])  I73.9 443.9      · PAD: Chronic, uncontrolled. See not in chart regarding referral to Vascular  · Mouth Sores: chronic. Follow up with dentist as scheduled  · Hypothyroidism: chronic, unclear control. Check TSH   All other conditions listed above: chronic and stable. Will continue current treatment regimen. Discussed following up with specialist as scheduled. Discussed recommendations for diet and exercise. I have discussed the diagnosis with the patient and the intended plan as seen in the above orders. The patient has received an after-visit summary along with patient information handout. I have discussed medication side effects and warnings with the patient as well. Disposition  Follow-up and Dispositions    · Return in about 6 months (around 9/30/2021).            Sophie Paredes MD

## 2021-03-31 NOTE — PROGRESS NOTES
Chief Complaint   Patient presents with   Sphinx.Ask Establish Care     1. Have you been to the ER, urgent care clinic since your last visit? Hospitalized since your last visit? No    2. Have you seen or consulted any other health care providers outside of the [address] [place] [address] since your last visit? Include any pap smears or colon screening.  No

## 2021-04-02 ENCOUNTER — OFFICE VISIT (OUTPATIENT)
Dept: RHEUMATOLOGY | Age: 76
End: 2021-04-02
Payer: MEDICARE

## 2021-04-02 VITALS
BODY MASS INDEX: 38.33 KG/M2 | HEART RATE: 92 BPM | TEMPERATURE: 97.5 F | WEIGHT: 216.4 LBS | SYSTOLIC BLOOD PRESSURE: 105 MMHG | DIASTOLIC BLOOD PRESSURE: 67 MMHG

## 2021-04-02 DIAGNOSIS — M81.0 AGE-RELATED OSTEOPOROSIS WITHOUT CURRENT PATHOLOGICAL FRACTURE: ICD-10-CM

## 2021-04-02 DIAGNOSIS — M80.08XD PATHOLOGICAL FRACTURE OF VERTEBRA DUE TO AGE-RELATED OSTEOPOROSIS WITH ROUTINE HEALING, SUBSEQUENT ENCOUNTER: ICD-10-CM

## 2021-04-02 DIAGNOSIS — M47.816 LUMBAR FACET ARTHROPATHY: ICD-10-CM

## 2021-04-02 DIAGNOSIS — E03.9 ACQUIRED HYPOTHYROIDISM: Primary | ICD-10-CM

## 2021-04-02 PROCEDURE — G8536 NO DOC ELDER MAL SCRN: HCPCS | Performed by: INTERNAL MEDICINE

## 2021-04-02 PROCEDURE — 3288F FALL RISK ASSESSMENT DOCD: CPT | Performed by: INTERNAL MEDICINE

## 2021-04-02 PROCEDURE — G8427 DOCREV CUR MEDS BY ELIG CLIN: HCPCS | Performed by: INTERNAL MEDICINE

## 2021-04-02 PROCEDURE — G9717 DOC PT DX DEP/BP F/U NT REQ: HCPCS | Performed by: INTERNAL MEDICINE

## 2021-04-02 PROCEDURE — G8752 SYS BP LESS 140: HCPCS | Performed by: INTERNAL MEDICINE

## 2021-04-02 PROCEDURE — G8417 CALC BMI ABV UP PARAM F/U: HCPCS | Performed by: INTERNAL MEDICINE

## 2021-04-02 PROCEDURE — 99214 OFFICE O/P EST MOD 30 MIN: CPT | Performed by: INTERNAL MEDICINE

## 2021-04-02 PROCEDURE — 1100F PTFALLS ASSESS-DOCD GE2>/YR: CPT | Performed by: INTERNAL MEDICINE

## 2021-04-02 PROCEDURE — G8754 DIAS BP LESS 90: HCPCS | Performed by: INTERNAL MEDICINE

## 2021-04-02 PROCEDURE — 1090F PRES/ABSN URINE INCON ASSESS: CPT | Performed by: INTERNAL MEDICINE

## 2021-04-02 RX ORDER — MOMETASONE FUROATE 1 MG/G
CREAM TOPICAL DAILY
COMMUNITY

## 2021-04-02 NOTE — PATIENT INSTRUCTIONS
1. For now, just continue the mometasone as needed for the scalp. If the skin worsens, let me know and can talk more about Tremfya. 2. I'm applying again for the Forteo pens. If you hear of any denials, let me know as sometimes we don't get the information properly. We'll need the PTH level done before we can get this approved. 3. Return in 6 months.

## 2021-04-02 NOTE — PROGRESS NOTES
REASON FOR VISIT:    is a 67 y.o. female with history of psoriasis, osteoarthritis, relapsing-remitting multiple sclerosis, osteoporosis, and possibly ulcerative colitis who is returning for followup of avascular necrosis and erosions in the foot without active inflammatory arthritis likely 2/2 CPPD. HISTORY OF PRESENT ILLNESS    Doesn't think the teriparatide was approved, we had not received any information of a rejection. Psoriasis is stable, mostly limited to scalp with few scattered LE lesions, but irritating still. Using mometasone for scalp. Sees a Dermatologist in [facility] on [employer] (unsure which). Still some irritation in the left buccal mucosa, feels raw. Tongue ulcers have resolved with weaning off of naproxen. No treatment still for ulcerative colitis, has a flare about once a month typically of loose stools. No gout flares. Experiences very sharp intermittent toe pains sometimes while walking, pass within seconds of resting. Says right shoe size has decreased 2 sizes though doesn't feel they were swollen before, no new diuretics. For pain, takes Tylenol PM at night        Disease History:  Has had scalp psoriasis since childhood, later had discolored fingers and small plaques on the knees. Has longstanding aches and pains. Low back is painful, left foot hurts. Left foot can become swollen. Pain comes and goes, every day is different in terms of both stiffness and pain. Now avoids making morning appointments due to morning stiffness. Low back is more bothersome with prolonged sitting. She did also have knees replaced nearly 20 years ago, though doesn't recall being told whether there was more than osteoarthritis present. Xrays in September demonstrated bilateral 2nd metatarsal AVN and corticated erosions in the left midfoot. Labs have been negative for DVEIN, RF, CCP, and HLA-B27. Has been having mouth ulcers, redness and irritation of the gums. Bleeding with brushing. Tried triamcinolone paste which wasn't very helpful, switched to dexamethasone which helps incompletely as long as she has it; now improved with cessation of naproxen. She uses Tgel for her scalp now. Has seen dermatology before mostly for skin cancer monitoring. Generally skin patches are sparse enough outside of the scalp that she doesn't feel they are worth treating. She was diagnosed with MS 15-20 years ago though suspects she had this as a teenager, when she was having syncopal episodes and diagnosed with epilepsy, though didn't take any AEDs for longer than a year or two. No problems with syncopal episodes since her pregnancy, though has waxing/waning weakness particularly on the left side, with relapsing-remitting phenotype. Says she has only needed prednisone when she would have occasional bronchitis when she was smoking and would require treatment of bronchitis. Had taken copaxone and Tecfidera but in her 65's has weaned off of these. Has lost about an inch of height, from 5'4 to 5'3\". Diagnosed with ulcerative colitis about 10 years ago when hospitalized, but says no chronic diarrhea and well-controlled with diet alone; naproxen for her joints doesn't seem to upset the gut. Received Reclast around Feb 2018 and 2019 with DXA scan in 2019 demonstrating T-score of -2.9 in the left hip with a T11 compression fracture. REVIEW OF SYSTEMS  Negatives as follows:  CONSTITUTlONAL:   +fatigue, weakness. Denies unexplained persistent fevers, weight change  HEAD/EYES:              +eye redness,pain 2/2 glaucoma, no HA, temporal pain  ENT:                            +oral/nasal ulcers, No recurrent sinus infections, dry mouth  RESPIRATORY:         +dry cough and exertional dyspnea.  No pleuritic pain, history of pleural effusions, hemoptysis  CARDIOVASCULAR:            Denies chest pain, history of pericardial effusions  GASTRO:                    +chronic diarrheaDenies heartburn, esophageal dysmotility, abdominal pain, nausea, vomiting, diarrhea, blood in the stool  HEMATOLOGIC:        +easy bruising. No\ purpura, swollen lymph nodes  SKIN:                           +nodules, Denies alopecia, sun sensitivity, unexplained persistent rash   VASCULAR:                Denies edema, cyanosis, raynaud phenomenon  NEUROLOGIC:           +paresthesias. Denies specific muscle weakness   PSYCHIATRIC:           No sleep disturbance / snoring, depression, anxiety  MSK:                           +morning stiffness 1 hour, SI joint pain, persistent joint swelling, persistent joint pain    Review of systems is as above and is otherwise negative. ALLERGIES  Adhes. band-tape-benzalkonium and Alphagan p [brimonidine]    MEDICATIONS  Current Outpatient Medications   Medication Sig    mometasone (ELOCON) 0.1 % topical cream Apply  to affected area daily.  dexAMETHasone (DECADRON) 0.5 mg/5 mL elixir SWISH/SPIT 5ML 4X DAILY prn mouth ulcers    simvastatin (ZOCOR) 40 mg tablet TAKE 1 TABLET BY MOUTH EVERY DAY IN THE EVENING    levothyroxine (SYNTHROID) 112 mcg tablet TAKE 1 TABLET BY MOUTH EVERY DAY BEFORE BREAKFAST    buPROPion XL (WELLBUTRIN XL) 150 mg tablet TAKE 1 TABLET BY MOUTH EVERY DAY IN THE MORNING    teriparatide (Forteo) 20 mcg/dose (600mcg/2.4mL) pnij injection 0.08 mL by SubCUTAneous route daily.  losartan-hydroCHLOROthiazide (HYZAAR) 100-25 mg per tablet TAKE 1 TABLET BY MOUTH DAILY FOR BLOOD PRESSURE    cloNIDine HCL (CATAPRES) 0.1 mg tablet TAKE 1 TABLET BY MOUTH NIGHTLY. *HOLD IF SBP IS LESS THAN OR EQUAL *    latanoprost (XALATAN) 0.005 % ophthalmic solution Administer 1 Drop to both eyes daily.  timolol (TIMOPTIC) 0.5 % ophthalmic solution INSTILL 1 DROP INTO INTO BOTH EYES TWICE A DAY    brinzolamide (AZOPT) 1 % ophthalmic suspension Administer 1 Drop to both eyes two (2) times a day.  DULoxetine (CYMBALTA) 30 mg capsule Take 30 mg by mouth daily.     cycloSPORINE (RESTASIS) 0.05 % ophthalmic emulsion Administer 1 Drop to both eyes two (2) times a day.  therapeutic multivitamin (THERAGRAN) tablet Take 1 Tab by mouth daily.  aspirin delayed-release 81 mg tablet Take 81 mg by mouth daily.  cyanocobalamin (VITAMIN B-12) 1,000 mcg tablet Take 1,000 mcg by mouth daily.  naproxen (NAPROSYN) 500 mg tablet TAKE ONE TABLET BY MOUTH TWICE DAILY AS NEEDED (Patient taking differently: Take 220 mg by mouth two (2) times daily as needed.)     No current facility-administered medications for this visit. PAST MEDICAL HISTORY  Past Medical History:   Diagnosis Date    Arthritis     Asthma     had it while she smoked cigaretts    Autoimmune disease ([address] 2004    pt has MS    Burning with urination     CAD (coronary artery disease) 8/7/2012    Chronic pain     Depression     Dyspepsia and other specified disorders of function of stomach     GERD (gastroesophageal reflux disease)     Glaucoma     Glaucoma 8/7/2012    Hx of migraine headaches 8/7/2012    Hyperlipidemia 8/7/2012    Hypertension     Hypothyroidism 8/7/2012    IFG (impaired fasting glucose) 8/7/2012    Lymphocytic colitis 8/7/2012    Morbid obesity ([address]     Multiple sclerosis ([address]     Normal cardiac stress test 8/7/2012    Osteoarthritis 8/7/2012    Other ill-defined conditions(799.89)     COLITIS    Psoriasis 8/7/2012    Psychiatric disorder     anexity depression    Renal stones 8/7/2012    Sunburn, blistering     Thyroid disease        FAMILY HISTORY  family history includes Bipolar Disorder in her father; Breast Cancer (age of onset: 43) in her mother; COPD in her mother; Cancer in her sister; Coronary Artery Disease in her father, maternal grandfather, and maternal grandmother; Heart Disease in her sister; Hypertension in her father; Lung Disease in her mother. No family history of psoriasis or rheumatoid arthritis.     SOCIAL HISTORY  She  reports that she quit smoking about 14 years ago. She has never used smokeless tobacco. She reports previous alcohol use. She reports current drug use. Drug: Marijuana. Social History     Social History Narrative    Lives with daughter   Uses medical marijuana for glaucoma and joint pain. DATA  Visit Vitals  /67   Pulse 92   Temp 97.5 °F (36.4 °C)   Wt 216 lb 6.4 oz (98.2 kg)   BMI 38.33 kg/m²    Body mass index is 38.33 kg/m². No flowsheet data found. General:  The patient is well developed, well nourished, alert, and in no apparent distress. Eyes: Sclera are anicteric. Decreased tear meniscus without injection today. Bilateral lens replacements. HEENT:  Interval improved mucosa along lower buccal border of mandibular gumline bilaterally. Adequate salivary pooling. No cervical or supraclavicular lymphadenopathy. Lungs:  Clear to auscultation bilaterally, without wheeze or stridor. Normal respiratory effort. Cor:  Regular rate and rhythm. No murmur rub or gallop. Abdomen: Soft, non-tender, without hepatomegaly or masses. Extremities: Cyanosis of both feet, 2s cap refill of distal toes. DP pulses faintly palpable. No calf tenderness or edema. Warm and well perfused. Skin: No alopecia. Bilateral remote-appearing TKA scars. Few actinic keratoses and seborrheic dermatoses on LE's, no appreciable psoriaform lesions. Neuro: Left-sided strength 4/5 in upper and lower, 3/5 left ankle dorsiflexion. Sensation intact to light touch in distal extremities. Musculoskeletal:    A comprehensive musculoskeletal exam was performed for all joints of each upper and lower extremity and assessed for swelling, tenderness and range of motion. Results are documented as below:  Midline tenderness ~L5/S1 with adjacent paraspinous tenderness bilaterally. Left midfoot bony tenderness without warmth or erythema. Still no evidence of synovitis in the small joints of the hands, wrists, shoulders, elbows, hips, knees or ankles.      Labs:  3/17/21: Cr 0.68, Ca 9.5  20:  CCP neg; ESR 32; PTH canceled; Ca 10.2  2020: RF negative, ESR 47, CRP 2mg/L, direct DEVIN negative, negative HLA-B27. CMP, CBC WNL    Imagin21 GAVIN's: GAVIN is normal (1.11) on the right and normal (0.99) on the left. PVR waveforms appear normal on the right and normal on the left. TBI is 0.57 on the right and 0.86 on the left. 20 XR Lspine, reviewed personally, on my review decrease in anterior height of T11; Radiology read: Accentuated lumbar lordosis. Lower lumbar facet arthropathy. No compression fracture    20 XR feet:  Bilateral second metatarsal AVN. Bilateral foot diffuse osteopenia.  Inactive or indolent arthritis at the left navicula-medial cuneiform articulation evidenced by corticated erosions of navicular-cuneiform articulation    20 XR hands: reviewed personally, chondrocalcinosis of the wrist, no erosions    2019 DXA:  Fractures identified on Lateral scanogram: Possible mild T11 wedge deformity  Femoral Neck Left:  Bone mineral density (gm/cm2): 0.700  % of peak bone mass: 67  % for age matched controls: 80  T-score: -2.4  Z-score: -1.2  Femoral Neck Right:  Bone mineral density (gm/cm2): 0.711  % of peak bone mass: 68  % for age matched controls:  80  T-score: -2.4  Z-score: -1.1  Total Hip Left:  Bone mineral density (gm/cm2): 0.641  % of peak bone mass: 64  % for age matched controls: 72  T-score: -2.9  Z-score: -1.9  Total Hip Right:  Bone mineral density (gm/cm2): 0.737  % of peak bone mass: 73  % for age matched controls:  80  T-score: -2.1  Z-score: -1.2  Lumbar Spine: L1-4   Bone mineral density (gm/cm2): 1.099  % of peak bone mass: 92  % for age matched controls: 99  T-score: -0.8  Z-score: -0.1  33% Radius Left:  Bone mineral density (gm/cm2): 0.814  % of peak bone mass: 92  % for age matched controls:  80  T-score: -0.8  Z-score: 1.5    ASSESSMENT AND PLAN  Ms. Zeyad Pa is a 67 y.o. female with history of psoriasis and possibly ulcerative colitis, who presents for evaluation of polyarticular arthralgia, on a background of advanced osteoporosis, poor distal circulation, AVN of the feet, and chondrocalcinosis with midfoot erosions but no active inflammatory arthritis clinically. Applying for Forteo again for osteoporosis with compression fracture and AVN, presume the missing PTH was the holdup with last attempt. 1. Pathological fracture of vertebra due to age-related osteoporosis with routine healing, subsequent encounter  -Re-applying for Forteo  - PTH INTACT; Future  - TSH 3RD GENERATION; Future    2. Acquired hypothyroidism  - TSH 3RD GENERATION; Future    3. Lumbar facet arthropathy; avascular necrosis  - REFERRAL TO PAIN MANAGEMENT      Orders Placed This Encounter    PTH INTACT    TSH 3RD GENERATION    REFERRAL TO PAIN MANAGEMENT    mometasone (ELOCON) 0.1 % topical cream     Patient Instructions   1. For now, just continue the mometasone as needed for the scalp. If the skin worsens, let me know and can talk more about Tremfya. 2. I'm applying again for the Forteo pens. If you hear of any denials, let me know as sometimes we don't get the information properly. We'll need the PTH level done before we can get this approved. 3. Return in 6 months. Medications: I am having Mimi Oliva maintain her aspirin delayed-release, cyanocobalamin, cycloSPORINE, therapeutic multivitamin, DULoxetine, brinzolamide, naproxen, latanoprost, timolol, cloNIDine HCL, losartan-hydroCHLOROthiazide, Forteo, buPROPion XL, levothyroxine, simvastatin, dexAMETHasone, and mometasone.     Follow up: 6 months    Face to face time: 20 minutes  Note preparation and records review day of service: 15 minutes  Total provider time day of service: 35 minutes      Alison Thao MD    Adult Rheumatology   [address], [address] [place] [address] Road   Phone [phone]  Fax [phone]

## 2021-04-06 PROBLEM — Z87.19 HISTORY OF COLITIS: Status: ACTIVE | Noted: 2021-04-06

## 2021-04-06 PROBLEM — K13.79 MOUTH SORES: Status: ACTIVE | Noted: 2021-04-06

## 2021-04-06 PROBLEM — G25.81 RLS (RESTLESS LEGS SYNDROME): Status: ACTIVE | Noted: 2021-04-06

## 2021-04-06 PROBLEM — G89.29 CHRONIC BILATERAL LOW BACK PAIN WITH RIGHT-SIDED SCIATICA: Status: ACTIVE | Noted: 2021-04-06

## 2021-04-06 PROBLEM — M54.41 CHRONIC BILATERAL LOW BACK PAIN WITH RIGHT-SIDED SCIATICA: Status: ACTIVE | Noted: 2021-04-06

## 2021-04-07 ENCOUNTER — TELEPHONE (OUTPATIENT)
Dept: FAMILY MEDICINE CLINIC | Age: 76
End: 2021-04-07

## 2021-04-07 ENCOUNTER — APPOINTMENT (OUTPATIENT)
Dept: FAMILY MEDICINE CLINIC | Age: 76
End: 2021-04-07

## 2021-04-07 DIAGNOSIS — I73.9 PAD (PERIPHERAL ARTERY DISEASE) (HCC): Primary | ICD-10-CM

## 2021-04-07 NOTE — TELEPHONE ENCOUNTER
Review of patient's GAVIN shows Evidence of arterial disease at right foot foot or digit level. Called and discussed this result with patient. Will provide referral to vascular surgery for further evaluation. Patient agrees to call to schedule appt.      Marissa Candelario MD

## 2021-04-08 LAB
CALCIUM SERPL-MCNC: 9.5 MG/DL (ref 8.5–10.1)
PTH-INTACT SERPL-MCNC: 63.1 PG/ML (ref 18.4–88)
TSH SERPL DL<=0.05 MIU/L-ACNC: 1.3 UIU/ML (ref 0.36–3.74)

## 2021-04-11 RX ORDER — TERIPARATIDE 250 UG/ML
20 INJECTION, SOLUTION SUBCUTANEOUS DAILY
Qty: 1 SYRINGE | Refills: 11 | Status: SHIPPED | OUTPATIENT
Start: 2021-04-11 | End: 2022-04-08

## 2021-04-28 ENCOUNTER — DOCUMENTATION ONLY (OUTPATIENT)
Dept: RHEUMATOLOGY | Age: 76
End: 2021-04-28

## 2021-04-28 NOTE — PROGRESS NOTES
Mailed [employer] [facility] patient assistance forms for E. IChinmay Adams injection.

## 2021-05-04 ENCOUNTER — DOCUMENTATION ONLY (OUTPATIENT)
Dept: RHEUMATOLOGY | Age: 76
End: 2021-05-04

## 2021-05-04 NOTE — PROGRESS NOTES
Faxed Dr. Adler Portal part of the  form [id number] patient assistance for Forteo Pen to [employer]

## 2021-05-07 RX ORDER — ZOLEDRONIC ACID 5 MG/100ML
5 INJECTION, SOLUTION INTRAVENOUS ONCE
Status: COMPLETED | OUTPATIENT
Start: 2021-05-12 | End: 2021-05-12

## 2021-05-07 RX ORDER — SODIUM CHLORIDE 9 MG/ML
25 INJECTION, SOLUTION INTRAVENOUS CONTINUOUS
Status: DISCONTINUED | OUTPATIENT
Start: 2021-05-12 | End: 2021-05-13 | Stop reason: HOSPADM

## 2021-05-12 ENCOUNTER — HOSPITAL ENCOUNTER (OUTPATIENT)
Dept: INFUSION THERAPY | Age: 76
Discharge: HOME | End: 2021-05-12
Payer: MEDICARE

## 2021-05-12 VITALS
RESPIRATION RATE: 18 BRPM | WEIGHT: 220 LBS | BODY MASS INDEX: 38.98 KG/M2 | HEART RATE: 66 BPM | DIASTOLIC BLOOD PRESSURE: 66 MMHG | SYSTOLIC BLOOD PRESSURE: 123 MMHG | TEMPERATURE: 97.5 F | HEIGHT: 63 IN

## 2021-05-12 LAB
ALBUMIN SERPL-MCNC: 3.2 G/DL (ref 3.5–5)
ANION GAP SERPL CALC-SCNC: 4 MMOL/L (ref 5–15)
BUN SERPL-MCNC: 14 MG/DL (ref 6–20)
BUN/CREAT SERPL: 21 (ref 12–20)
CALCIUM SERPL-MCNC: 9.7 MG/DL (ref 8.5–10.1)
CHLORIDE SERPL-SCNC: 107 MMOL/L (ref 97–108)
CO2 SERPL-SCNC: 30 MMOL/L (ref 21–32)
CREAT SERPL-MCNC: 0.68 MG/DL (ref 0.55–1.02)
GLUCOSE SERPL-MCNC: 90 MG/DL (ref 65–100)
PHOSPHATE SERPL-MCNC: 3 MG/DL (ref 2.6–4.7)
POTASSIUM SERPL-SCNC: 4.6 MMOL/L (ref 3.5–5.1)
SODIUM SERPL-SCNC: 141 MMOL/L (ref 136–145)

## 2021-05-12 PROCEDURE — 74011000258 HC RX REV CODE- 258: Performed by: FAMILY MEDICINE

## 2021-05-12 PROCEDURE — 80069 RENAL FUNCTION PANEL: CPT

## 2021-05-12 PROCEDURE — 36415 COLL VENOUS BLD VENIPUNCTURE: CPT

## 2021-05-12 PROCEDURE — 96374 THER/PROPH/DIAG INJ IV PUSH: CPT

## 2021-05-12 PROCEDURE — 74011250636 HC RX REV CODE- 250/636: Performed by: FAMILY MEDICINE

## 2021-05-12 RX ADMIN — ZOLEDRONIC ACID 5 MG: 5 INJECTION, SOLUTION INTRAVENOUS at 18:10

## 2021-05-12 RX ADMIN — SODIUM CHLORIDE 25 ML/HR: 900 INJECTION, SOLUTION INTRAVENOUS at 18:01

## 2021-05-12 NOTE — PROGRESS NOTES
[facility] VISIT NOTE    1405  Pt arrived at [facility] ambulatory and in no distress for Reclast infusion. Assessment completed, no new complaints at this time. Pt denies all COVID symptoms, recent travel, and recent exposure. Pt denies recent or scheduled invasive dental procedures. PIV started in [facility] with no difficulty. Positive blood return noted and labs drawn. Recent Results (from the past 12 hour(s))   RENAL FUNCTION PANEL    Collection Time: 05/12/21  4:32 PM   Result Value Ref Range    Sodium 141 136 - 145 mmol/L    Potassium 4.6 3.5 - 5.1 mmol/L    Chloride 107 97 - 108 mmol/L    CO2 30 21 - 32 mmol/L    Anion gap 4 (L) 5 - 15 mmol/L    Glucose 90 65 - 100 mg/dL    BUN 14 6 - 20 MG/DL    Creatinine 0.68 0.55 - 1.02 MG/DL    BUN/Creatinine ratio 21 (H) 12 - 20      GFR est AA >60 >60 ml/min/1.73m2    GFR est non-AA >60 >60 ml/min/1.73m2    Calcium 9.7 8.5 - 10.1 MG/DL    Phosphorus 3.0 2.6 - 4.7 MG/DL    Albumin 3.2 (L) 3.5 - 5.0 g/dL     Medications received:  NS at [address] IV    Blood pressure 123/66, pulse 66, temperature 97.5 °F (36.4 °C), resp. rate 18, height 5' 3\" (1.6 m), weight 99.8 kg (220 lb), not currently breastfeeding. Tolerated treatment well, no adverse reaction noted. PIV removed per protocol. 1835  D/C'd from [facility] ambulatory and in no distress. Next appointment is 2/28/22.

## 2021-06-14 RX ORDER — BUPROPION HYDROCHLORIDE 150 MG/1
150 TABLET ORAL
Qty: 90 TABLET | Refills: 1 | Status: SHIPPED | OUTPATIENT
Start: 2021-06-14 | End: 2021-12-16 | Stop reason: SDUPTHER

## 2021-06-14 NOTE — TELEPHONE ENCOUNTER
Last Visit: 3/31/21 MD Srivastava  Next Appointment: Not scheduled  Previous Refill Encounter(s): 12/17/20 90 + 1    Requested Prescriptions     Pending Prescriptions Disp Refills    buPROPion XL (WELLBUTRIN XL) 150 mg tablet 90 Tablet 1     Sig: Take 1 Tablet by mouth every morning.

## 2021-07-19 DIAGNOSIS — I10 HYPERTENSION, UNSPECIFIED TYPE: ICD-10-CM

## 2021-07-19 RX ORDER — LOSARTAN POTASSIUM AND HYDROCHLOROTHIAZIDE 25; 100 MG/1; MG/1
TABLET ORAL
Qty: 90 TABLET | Refills: 1 | Status: SHIPPED | OUTPATIENT
Start: 2021-07-19 | End: 2022-04-18 | Stop reason: SDUPTHER

## 2021-07-19 NOTE — TELEPHONE ENCOUNTER
Last Visit: 3/31/21 MD Shailesh Bolden  Next Appointment: Not scheduled  Previous refill encounter(s)   11/01/2020 Hyzaar #90 with 1 refill     Requested Prescriptions     Pending Prescriptions Disp Refills    losartan-hydroCHLOROthiazide (HYZAAR) 100-25 mg per tablet 90 Tablet 1     Sig: Take 1 tab by mouth daily for blood pressure.

## 2021-12-16 RX ORDER — BUPROPION HYDROCHLORIDE 150 MG/1
150 TABLET ORAL
Qty: 90 TABLET | Refills: 0 | Status: SHIPPED | OUTPATIENT
Start: 2021-12-16 | End: 2022-03-15 | Stop reason: SDUPTHER

## 2021-12-16 RX ORDER — SIMVASTATIN 40 MG/1
40 TABLET, FILM COATED ORAL EVERY EVENING
Qty: 90 TABLET | Refills: 0 | Status: SHIPPED | OUTPATIENT
Start: 2021-12-16 | End: 2022-04-18 | Stop reason: SDUPTHER

## 2021-12-16 RX ORDER — LEVOTHYROXINE SODIUM 112 UG/1
112 TABLET ORAL
Qty: 90 TABLET | Refills: 0 | Status: SHIPPED | OUTPATIENT
Start: 2021-12-16 | End: 2022-04-18 | Stop reason: SDUPTHER

## 2021-12-16 NOTE — TELEPHONE ENCOUNTER
Last Visit: 3/31/21 MD Vamshi Lomax, lipid 9/2020, TSH 4/2021  Next Appointment: Not scheduled- past due in 9/2021  Previous Refill Encounter(s):   buproprion 6/14/21 90 + 1  Levothyroxine 12/18/20 90 + 3  Simvastatin 12/19/20 90 + 3    Requested Prescriptions     Pending Prescriptions Disp Refills    buPROPion XL (WELLBUTRIN XL) 150 mg tablet 90 Tablet 0     Sig: Take 1 Tablet by mouth every morning.  levothyroxine (SYNTHROID) 112 mcg tablet 90 Tablet 1     Sig: Take 1 Tablet by mouth Daily (before breakfast).  simvastatin (ZOCOR) 40 mg tablet 90 Tablet 0     Sig: Take 1 Tablet by mouth every evening.

## 2022-02-21 ENCOUNTER — TELEPHONE (OUTPATIENT)
Dept: FAMILY MEDICINE CLINIC | Age: 77
End: 2022-02-21

## 2022-02-28 ENCOUNTER — HOSPITAL ENCOUNTER (OUTPATIENT)
Dept: INFUSION THERAPY | Age: 77
End: 2022-02-28

## 2022-02-28 RX ORDER — DULOXETIN HYDROCHLORIDE 30 MG/1
30 CAPSULE, DELAYED RELEASE ORAL DAILY
Qty: 90 CAPSULE | Refills: 0 | Status: SHIPPED | OUTPATIENT
Start: 2022-02-28 | End: 2022-04-18 | Stop reason: SDUPTHER

## 2022-03-15 RX ORDER — BUPROPION HYDROCHLORIDE 150 MG/1
150 TABLET ORAL
Qty: 90 TABLET | Refills: 0 | Status: SHIPPED | OUTPATIENT
Start: 2022-03-15 | End: 2022-06-17 | Stop reason: SDUPTHER

## 2022-03-15 NOTE — TELEPHONE ENCOUNTER
Last visit 03/31/2021 MD Christina Lobo   Next appointment 04/08/2022 MD Christina Lobo   Previous refill encounter(s)   12/16/2021 Wellbutrin-XL #90     Requested Prescriptions     Pending Prescriptions Disp Refills    buPROPion XL (WELLBUTRIN XL) 150 mg tablet 90 Tablet 0     Sig: Take 1 Tablet by mouth every morning.

## 2022-03-18 PROBLEM — F33.0 MILD EPISODE OF RECURRENT MAJOR DEPRESSIVE DISORDER (HCC): Status: ACTIVE | Noted: 2019-04-04

## 2022-03-18 PROBLEM — E66.01 SEVERE OBESITY (BMI 35.0-39.9) WITH COMORBIDITY (HCC): Status: ACTIVE | Noted: 2018-05-17

## 2022-03-19 PROBLEM — N32.81 OVERACTIVE DETRUSOR: Status: ACTIVE | Noted: 2018-05-17

## 2022-03-19 PROBLEM — G89.29 CHRONIC BILATERAL LOW BACK PAIN WITH RIGHT-SIDED SCIATICA: Status: ACTIVE | Noted: 2021-04-06

## 2022-03-19 PROBLEM — L43.8 EROSIVE ORAL LICHEN PLANUS: Status: ACTIVE | Noted: 2021-01-05

## 2022-03-19 PROBLEM — M54.41 CHRONIC BILATERAL LOW BACK PAIN WITH RIGHT-SIDED SCIATICA: Status: ACTIVE | Noted: 2021-04-06

## 2022-03-19 PROBLEM — R39.81 FUNCTIONAL INCONTINENCE: Status: ACTIVE | Noted: 2018-10-02

## 2022-03-19 PROBLEM — Z87.19 HISTORY OF COLITIS: Status: ACTIVE | Noted: 2021-04-06

## 2022-03-19 PROBLEM — R60.9 CHRONIC EDEMA: Status: ACTIVE | Noted: 2017-04-04

## 2022-03-19 PROBLEM — N81.11 PROLAPSE OF VAGINAL WALL WITH MIDLINE CYSTOCELE: Status: ACTIVE | Noted: 2018-05-17

## 2022-03-19 PROBLEM — R53.1 LEFT-SIDED WEAKNESS: Status: ACTIVE | Noted: 2017-04-04

## 2022-03-19 PROBLEM — G25.81 RLS (RESTLESS LEGS SYNDROME): Status: ACTIVE | Noted: 2021-04-06

## 2022-03-19 PROBLEM — F12.90 MARIJUANA USE: Status: ACTIVE | Noted: 2018-10-13

## 2022-03-20 PROBLEM — K13.79 MOUTH SORES: Status: ACTIVE | Noted: 2021-04-06

## 2022-04-08 ENCOUNTER — OFFICE VISIT (OUTPATIENT)
Dept: FAMILY MEDICINE CLINIC | Age: 77
End: 2022-04-08
Payer: MEDICARE

## 2022-04-08 VITALS
OXYGEN SATURATION: 98 % | SYSTOLIC BLOOD PRESSURE: 136 MMHG | WEIGHT: 233.4 LBS | DIASTOLIC BLOOD PRESSURE: 76 MMHG | BODY MASS INDEX: 41.36 KG/M2 | HEIGHT: 63 IN | TEMPERATURE: 97.8 F | HEART RATE: 84 BPM | RESPIRATION RATE: 16 BRPM

## 2022-04-08 DIAGNOSIS — M54.41 CHRONIC BILATERAL LOW BACK PAIN WITH RIGHT-SIDED SCIATICA: ICD-10-CM

## 2022-04-08 DIAGNOSIS — G35 MULTIPLE SCLEROSIS (HCC): ICD-10-CM

## 2022-04-08 DIAGNOSIS — E66.01 OBESITY, CLASS III, BMI 40-49.9 (MORBID OBESITY) (HCC): ICD-10-CM

## 2022-04-08 DIAGNOSIS — E78.5 HYPERLIPIDEMIA, UNSPECIFIED HYPERLIPIDEMIA TYPE: ICD-10-CM

## 2022-04-08 DIAGNOSIS — E03.9 ACQUIRED HYPOTHYROIDISM: ICD-10-CM

## 2022-04-08 DIAGNOSIS — G89.29 CHRONIC BILATERAL LOW BACK PAIN WITH RIGHT-SIDED SCIATICA: ICD-10-CM

## 2022-04-08 DIAGNOSIS — M79.672 LEFT FOOT PAIN: ICD-10-CM

## 2022-04-08 DIAGNOSIS — F33.0 MILD EPISODE OF RECURRENT MAJOR DEPRESSIVE DISORDER (HCC): ICD-10-CM

## 2022-04-08 DIAGNOSIS — Z00.00 MEDICARE ANNUAL WELLNESS VISIT, SUBSEQUENT: Primary | ICD-10-CM

## 2022-04-08 DIAGNOSIS — I73.9 PAD (PERIPHERAL ARTERY DISEASE) (HCC): ICD-10-CM

## 2022-04-08 DIAGNOSIS — M1A.9XX0 CHRONIC GOUT WITHOUT TOPHUS, UNSPECIFIED CAUSE, UNSPECIFIED SITE: ICD-10-CM

## 2022-04-08 DIAGNOSIS — I10 HYPERTENSION, UNSPECIFIED TYPE: ICD-10-CM

## 2022-04-08 PROCEDURE — G8752 SYS BP LESS 140: HCPCS | Performed by: FAMILY MEDICINE

## 2022-04-08 PROCEDURE — 1101F PT FALLS ASSESS-DOCD LE1/YR: CPT | Performed by: FAMILY MEDICINE

## 2022-04-08 PROCEDURE — G8754 DIAS BP LESS 90: HCPCS | Performed by: FAMILY MEDICINE

## 2022-04-08 PROCEDURE — G8536 NO DOC ELDER MAL SCRN: HCPCS | Performed by: FAMILY MEDICINE

## 2022-04-08 PROCEDURE — G8417 CALC BMI ABV UP PARAM F/U: HCPCS | Performed by: FAMILY MEDICINE

## 2022-04-08 PROCEDURE — G0439 PPPS, SUBSEQ VISIT: HCPCS | Performed by: FAMILY MEDICINE

## 2022-04-08 PROCEDURE — G8427 DOCREV CUR MEDS BY ELIG CLIN: HCPCS | Performed by: FAMILY MEDICINE

## 2022-04-08 PROCEDURE — G9717 DOC PT DX DEP/BP F/U NT REQ: HCPCS | Performed by: FAMILY MEDICINE

## 2022-04-08 RX ORDER — GABAPENTIN 100 MG/1
100 CAPSULE ORAL
Qty: 90 CAPSULE | Refills: 0 | Status: SHIPPED | OUTPATIENT
Start: 2022-04-08

## 2022-04-08 NOTE — PROGRESS NOTES
This is the Subsequent Medicare Annual Wellness Exam, performed 12 months or more after the Initial AWV or the last Subsequent AWV    · Mouth Sores : she reports as improved with PRN dexamethasone  · Osteoporosis: on reclast and followed by rheumatology  · Gout: followed by rheumatolgy  · Left Foot Pain: She reports that this has been a little bothersome recently  · Chronic Back Pain: followed by Dr. Milton Duque with last visit in 2021  · Concern for PVD: he reprts that she she saw vacular with stable findings  · MS: followed by neurology (Dr. Dany Godoy at [facility]), no recent exacerbations, not on any meds for MS at this time  · Hypothyroidism: taking daily as directed  · HTN: doing well on losartan, HCTZ, she reports home BP as well controlled  · HLD: doing well on zocor  · RLS: uses gabapentin PRN  · Anxiety and Depression: denies any SI; she reports that her mood has been good  · Hx of Colitis: continues occasional symtpoms about once per month for which she takes immodium; She is not interested in seeing GI. HM  She has had both COVID vaccines and booster (Nov, 2021)      I have reviewed the patient's medical history in detail and updated the computerized patient record. Assessment/Plan   Education and counseling provided:  Are appropriate based on today's review and evaluation    1. Medicare annual wellness visit, subsequent  2. Chronic bilateral low back pain with right-sided sciatica  -     gabapentin (NEURONTIN) 100 mg capsule; Take 1 Capsule by mouth daily as needed for Pain., Normal, Disp-90 Capsule, R-0  3. Multiple sclerosis ([address]  4. Mild episode of recurrent major depressive disorder ([address]  5. PAD (peripheral artery disease) ([facility])  6. Obesity, Class III, BMI 40-49.9 (morbid obesity) ([address]  7. Left foot pain  -     REFERRAL TO PODIATRY  8. Hypertension, unspecified type  -     CBC W/O DIFF; Future  -     METABOLIC PANEL, COMPREHENSIVE; Future  9.  Acquired hypothyroidism  -     TSH 3RD GENERATION; Future  10. Chronic gout without tophus, unspecified cause, unspecified site  -     URIC ACID; Future  11. Hyperlipidemia, unspecified hyperlipidemia type  -     LIPID PANEL; Future    · Medicare Wellness Exam: Reviewed and addressed patient's medical history and concerns as discussed in note. Reviewed recommended screenings and immunizations. Discussed recommendations for diet, exercise, and lifestyle. · All conditions listed above: Chronic, stable and/or managed by specialist. Will continue current treatment regimen. Will check labs as reflected above. Discussed following up with specialist as scheduled. Discussed recommendations for diet and exercise. Depression Risk Factor Screening     3 most recent PHQ Screens 4/8/2022   Little interest or pleasure in doing things Not at all   Feeling down, depressed, irritable, or hopeless Not at all   Total Score PHQ 2 0       Alcohol & Drug Abuse Risk Screen    Do you average more than 1 drink per night or more than 7 drinks a week:  No    On any one occasion in the past three months have you have had more than 3 drinks containing alcohol:  No          Functional Ability and Level of Safety    Hearing: Hearing is good. Activities of Daily Living: The home contains: handrails and grab bars  Patient does total self care      Ambulation: with difficulty, uses a walker     Fall Risk:  Fall Risk Assessment, last 12 mths 4/8/2022   Able to walk? Yes   Fall in past 12 months? 0   Do you feel unsteady? 0   Are you worried about falling 0   Is TUG test greater than 12 seconds? -   Is the gait abnormal? -   Number of falls in past 12 months -   Fall with injury?  -      Abuse Screen:  Patient is not abused       Cognitive Screening    Has your family/caregiver stated any concerns about your memory: no     Cognitive Screening: Normal - Clock Drawing Test    Health Maintenance Due     Health Maintenance Due   Topic Date Due    Hepatitis C Screening  Never done    DTaP/Tdap/Td series (2 - Tdap) 02/28/2017    Shingrix Vaccine Age 50> (2 of 2) 05/01/2019    COVID-19 Vaccine (3 - Booster for Strange Peter series) 08/15/2021    Lipid Screen  09/11/2021       Patient Care Team   Patient Care Team:  Christin Doshi MD as PCP - General (Family Medicine)  Christin Doshi MD as PCP - [address] [place] [address] Provider  Delia Hudson MD (Cardiology)  Shwetha Davey MD (Neurology)  Delma Long MD (Rheumatology)  Mady Sever, MD (Dermatology)    History     Patient Active Problem List   Diagnosis Code    HTN (hypertension) I10    Reflux [id number]    Multiple joint pain M25.50    Arthritis M19.90    Urine, incontinence, stress female N39.3    Multiple sclerosis ([address] G35    Urinary retention R33.9    CAD (coronary artery disease) I25.10    Hyperlipidemia E78.5    Hypothyroidism E03.9    Lymphocytic colitis K52.832    Renal stones N20.0    Osteoporosis M81.0    Hx of migraine headaches Z86.69    Psoriasis L40.9    Glaucoma H40.9    Left-sided weakness R53.1    Chronic edema R60.9    Severe obesity (BMI 35.0-39. 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    Functional incontinence R39.81    Marijuana use F12.90    Mild episode of recurrent major depressive disorder (HCC) F33.0    Erosive oral lichen planus X30.9    Chronic bilateral low back pain with right-sided sciatica M54.41, G89.29    Mouth sores K13.79    RLS (restless legs syndrome) G25.81    History of colitis Z87.19     Past Medical History:   Diagnosis Date    Arthritis     Asthma     had it while she smoked cigaretts    Autoimmune disease ([address] 2004    pt has MS    Burning with urination     CAD (coronary artery disease) 8/7/2012    Chronic pain     Depression     Dyspepsia and other specified disorders of function of stomach     GERD (gastroesophageal reflux disease)     Glaucoma     Glaucoma 8/7/2012  Hx of migraine headaches 8/7/2012    Hyperlipidemia 8/7/2012    Hypertension     Hypothyroidism 8/7/2012    IFG (impaired fasting glucose) 8/7/2012    Lymphocytic colitis 8/7/2012    Morbid obesity ([address]     Multiple sclerosis (HCC)     Normal cardiac stress test 8/7/2012    Osteoarthritis 8/7/2012    Other ill-defined conditions(799.89)     COLITIS    Psoriasis 8/7/2012    Psychiatric disorder     anexity depression    Renal stones 8/7/2012    Sunburn, blistering     Thyroid disease       Past Surgical History:   Procedure Laterality Date    HX APPENDECTOMY      and lysis of adhesions    HX BREAST BIOPSY Right     many yrs ago; neg    HX CATARACT REMOVAL      both    HX CHOLECYSTECTOMY  1994    lap    HX KNEE REPLACEMENT      both    HX LYSIS OF ADHESIONS      HX MOHS PROCEDURES  04/19/2017    BCC left infraorbital cheek by Dr. Melida Rivas      cyst removal right breast     Current Outpatient Medications   Medication Sig Dispense Refill    tramadol HCl (TRAMADOL PO) Take 1 Tablet by mouth as needed.  gabapentin (NEURONTIN) 100 mg capsule Take 1 Capsule by mouth daily as needed for Pain. 90 Capsule 0    buPROPion XL (WELLBUTRIN XL) 150 mg tablet Take 1 Tablet by mouth every morning. 90 Tablet 0    DULoxetine (CYMBALTA) 30 mg capsule Take 1 Capsule by mouth daily. 90 Capsule 0    levothyroxine (SYNTHROID) 112 mcg tablet Take 1 Tablet by mouth Daily (before breakfast). 90 Tablet 0    simvastatin (ZOCOR) 40 mg tablet Take 1 Tablet by mouth every evening. 90 Tablet 0    losartan-hydroCHLOROthiazide (HYZAAR) 100-25 mg per tablet Take 1 tab by mouth daily for blood pressure. 90 Tablet 1    mometasone (ELOCON) 0.1 % topical cream Apply  to affected area daily.       dexAMETHasone (DECADRON) 0.5 mg/5 mL elixir SWISH/SPIT 5ML 4X DAILY prn mouth ulcers 237 mL 4    latanoprost (XALATAN) 0.005 % ophthalmic solution Administer 1 Drop to both eyes daily.  timolol (TIMOPTIC) 0.5 % ophthalmic solution INSTILL 1 DROP INTO INTO BOTH EYES TWICE A DAY  3    brinzolamide (AZOPT) 1 % ophthalmic suspension Administer 1 Drop to both eyes two (2) times a day.  cycloSPORINE (RESTASIS) 0.05 % ophthalmic emulsion Administer 1 Drop to both eyes two (2) times a day.  therapeutic multivitamin (THERAGRAN) tablet Take 1 Tab by mouth daily.  aspirin delayed-release 81 mg tablet Take 81 mg by mouth daily.  cyanocobalamin (VITAMIN B-12) 1,000 mcg tablet Take 1,000 mcg by mouth daily. Allergies   Allergen Reactions    Adhes.  Band-Tape-Benzalkonium Rash    Alphagan P [Brimonidine] Other (comments)     Severe reaction and damage to the cornea       Family History   Problem Relation Age of Onset    COPD Mother    Marcello Knight Lung Disease Mother     Breast Cancer Mother 43    Coronary Art Dis Father     Bipolar Disorder Father     Hypertension Father     Heart Disease Sister         A fib    Cancer Sister         pancreatic     Coronary Art Dis Maternal Grandmother     Coronary Art Dis Maternal Grandfather      Social History     Tobacco Use    Smoking status: Former Smoker     Quit date: 9/19/2006     Years since quitting: 15.5    Smokeless tobacco: Never Used   Substance Use Topics    Alcohol use: Not Currently     Comment: 1 DRINK/MO         Vitor Billy MD

## 2022-04-08 NOTE — PATIENT INSTRUCTIONS
Medicare Wellness Visit, Female     The best way to live healthy is to have a lifestyle where you eat a well-balanced diet, exercise regularly, limit alcohol use, and quit all forms of tobacco/nicotine, if applicable. Regular preventive services are another way to keep healthy. Preventive services (vaccines, screening tests, monitoring & exams) can help personalize your care plan, which helps you manage your own care. Screening tests can find health problems at the earliest stages, when they are easiest to treat. [facility] follows the current, evidence-based guidelines published by the [facility] Reed ([facility]) when recommending preventive services for our patients. Because we follow these guidelines, sometimes recommendations change over time as research supports it. (For example, mammograms used to be recommended annually. Even though Medicare will still pay for an annual mammogram, the newer guidelines recommend a mammogram every two years for women of average risk). Of course, you and your doctor may decide to screen more often for some diseases, based on your risk and your co-morbidities (chronic disease you are already diagnosed with). Preventive services for you include:  - Medicare offers their members a free annual wellness visit, which is time for you and your primary care provider to discuss and plan for your preventive service needs. Take advantage of this benefit every year!  -All adults over the age of 72 should receive the recommended pneumonia vaccines. Current USPSTF guidelines recommend a series of two vaccines for the best pneumonia protection.   -All adults should have a flu vaccine yearly and a tetanus vaccine every 10 years.   -All adults age 48 and older should receive the shingles vaccines (series of two vaccines).       -All adults age 38-68 who are overweight should have a diabetes screening test once every three years.   -All adults born between [birth year] and 1966 should be screened once for Hepatitis C.  -Other screening tests and preventive services for persons with diabetes include: an eye exam to screen for diabetic retinopathy, a kidney function test, a foot exam, and stricter control over your cholesterol.   -Cardiovascular screening for adults with routine risk involves an electrocardiogram (ECG) at intervals determined by your doctor.   -Colorectal cancer screenings should be done for adults age 54-65 with no increased risk factors for colorectal cancer. There are a number of acceptable methods of screening for this type of cancer. Each test has its own benefits and drawbacks. Discuss with your doctor what is most appropriate for you during your annual wellness visit. The different tests include: colonoscopy (considered the best screening method), a fecal occult blood test, a fecal DNA test, and sigmoidoscopy.    -A bone mass density test is recommended when a woman turns 65 to screen for osteoporosis. This test is only recommended one time, as a screening. Some providers will use this same test as a disease monitoring tool if you already have osteoporosis. -Breast cancer screenings are recommended every other year for women of normal risk, age 54-69.  -Cervical cancer screenings for women over age 72 are only recommended with certain risk factors.      Here is a list of your current Health Maintenance items (your personalized list of preventive services) with a due date:  Health Maintenance Due   Topic Date Due    Hepatitis C Test  Never done    DTaP/Tdap/Td  (2 - Tdap) 02/28/2017    Shingles Vaccine (2 of 2) 05/01/2019    COVID-19 Vaccine (3 - Booster for Pfizer series) 08/15/2021    Cholesterol Test   09/11/2021

## 2022-04-08 NOTE — PROGRESS NOTES
Chief Complaint   Patient presents with   [facility] Annual Wellness Visit       1. Have you been to the ER, urgent care clinic since your last visit? Hospitalized since your last visit? No    2. Have you seen or consulted any other health care providers outside of the [address] [place] [address] [place] since your last visit? Include any pap smears or colon screening. No    3 most recent PHQ Screens 4/8/2022   Little interest or pleasure in doing things Not at all   Feeling down, depressed, irritable, or hopeless Not at all   Total Score PHQ 2 0       Abuse Screening Questionnaire 4/8/2022   Do you ever feel afraid of your partner? N   Are you in a relationship with someone who physically or mentally threatens you? N   Is it safe for you to go home? Y       ADL Assessment 4/8/2022   Feeding yourself No Help Needed   Getting from bed to chair No Help Needed   Getting dressed No Help Needed   Bathing or showering Help Needed   Walk across the room (includes cane/walker) Help Needed   Using the telphone No Help Needed   Taking your medications No Help Needed   Preparing meals No Help Needed   Managing money (expenses/bills) No Help Needed   Moderately strenuous housework (laundry) No Help Needed   Shopping for personal items (toiletries/medicines) No Help Needed   Shopping for groceries No Help Needed   Driving No Help Needed   Climbing a flight of stairs No Help Needed   Getting to places beyond walking distances No Help Needed       Fall Risk Assessment, last 12 mths 4/8/2022   Able to walk? Yes   Fall in past 12 months? 0   Do you feel unsteady? 0   Are you worried about falling 0   Is TUG test greater than 12 seconds? -   Is the gait abnormal? -   Number of falls in past 12 months -   Fall with injury?  -

## 2022-04-09 LAB
ALBUMIN SERPL-MCNC: 3.5 G/DL (ref 3.5–5)
ALBUMIN/GLOB SERPL: 1.2 {RATIO} (ref 1.1–2.2)
ALP SERPL-CCNC: 60 U/L (ref 45–117)
ALT SERPL-CCNC: 25 U/L (ref 12–78)
ANION GAP SERPL CALC-SCNC: 4 MMOL/L (ref 5–15)
AST SERPL-CCNC: 14 U/L (ref 15–37)
BILIRUB SERPL-MCNC: 0.4 MG/DL (ref 0.2–1)
BUN SERPL-MCNC: 17 MG/DL (ref 6–20)
BUN/CREAT SERPL: 27 (ref 12–20)
CALCIUM SERPL-MCNC: 9.5 MG/DL (ref 8.5–10.1)
CHLORIDE SERPL-SCNC: 107 MMOL/L (ref 97–108)
CHOLEST SERPL-MCNC: 148 MG/DL
CO2 SERPL-SCNC: 31 MMOL/L (ref 21–32)
CREAT SERPL-MCNC: 0.64 MG/DL (ref 0.55–1.02)
ERYTHROCYTE [DISTWIDTH] IN BLOOD BY AUTOMATED COUNT: 13.4 % (ref 11.5–14.5)
GLOBULIN SER CALC-MCNC: 2.9 G/DL (ref 2–4)
GLUCOSE SERPL-MCNC: 108 MG/DL (ref 65–100)
HCT VFR BLD AUTO: 46.7 % (ref 35–47)
HDLC SERPL-MCNC: 66 MG/DL
HDLC SERPL: 2.2 {RATIO} (ref 0–5)
HGB BLD-MCNC: 14.8 G/DL (ref 11.5–16)
LDLC SERPL CALC-MCNC: 59.4 MG/DL (ref 0–100)
MCH RBC QN AUTO: 30.9 PG (ref 26–34)
MCHC RBC AUTO-ENTMCNC: 31.7 G/DL (ref 30–36.5)
MCV RBC AUTO: 97.5 FL (ref 80–99)
NRBC # BLD: 0 K/UL (ref 0–0.01)
NRBC BLD-RTO: 0 PER 100 WBC
PLATELET # BLD AUTO: 204 K/UL (ref 150–400)
PMV BLD AUTO: 12.3 FL (ref 8.9–12.9)
POTASSIUM SERPL-SCNC: 4.6 MMOL/L (ref 3.5–5.1)
PROT SERPL-MCNC: 6.4 G/DL (ref 6.4–8.2)
RBC # BLD AUTO: 4.79 M/UL (ref 3.8–5.2)
SODIUM SERPL-SCNC: 142 MMOL/L (ref 136–145)
TRIGL SERPL-MCNC: 113 MG/DL (ref ?–150)
TSH SERPL DL<=0.05 MIU/L-ACNC: 1.91 UIU/ML (ref 0.36–3.74)
URATE SERPL-MCNC: 4.9 MG/DL (ref 2.6–6)
VLDLC SERPL CALC-MCNC: 22.6 MG/DL
WBC # BLD AUTO: 8.4 K/UL (ref 3.6–11)

## 2022-04-11 NOTE — PROGRESS NOTES
Dear Christel Olea,    All of your labs are stable. If you have any questions, please feel free to call our office at [phone].      Kole Aburto MD

## 2022-04-18 DIAGNOSIS — I10 HYPERTENSION, UNSPECIFIED TYPE: ICD-10-CM

## 2022-04-18 RX ORDER — LOSARTAN POTASSIUM AND HYDROCHLOROTHIAZIDE 25; 100 MG/1; MG/1
TABLET ORAL
Qty: 90 TABLET | Refills: 1 | Status: SHIPPED | OUTPATIENT
Start: 2022-04-18

## 2022-04-18 RX ORDER — SIMVASTATIN 40 MG/1
40 TABLET, FILM COATED ORAL EVERY EVENING
Qty: 90 TABLET | Refills: 3 | Status: SHIPPED | OUTPATIENT
Start: 2022-04-18

## 2022-04-18 RX ORDER — LEVOTHYROXINE SODIUM 112 UG/1
112 TABLET ORAL
Qty: 90 TABLET | Refills: 3 | Status: SHIPPED | OUTPATIENT
Start: 2022-04-18

## 2022-04-18 RX ORDER — DULOXETIN HYDROCHLORIDE 30 MG/1
30 CAPSULE, DELAYED RELEASE ORAL DAILY
Qty: 90 CAPSULE | Refills: 1 | Status: SHIPPED | OUTPATIENT
Start: 2022-04-18

## 2022-04-18 NOTE — TELEPHONE ENCOUNTER
Last Visit: 4/8/22 MD Carmela Valladares, labs completed  Next Appointment: None  Previous Refill Encounter(s):   Duloxetine  2/28/22 90  Losartan/hctz 7/19/21 90+ 1  Simvastatin 12/16/21 90  Levothyroxine 12/16/21 90    Requested Prescriptions     Pending Prescriptions Disp Refills    DULoxetine (CYMBALTA) 30 mg capsule 90 Capsule 1     Sig: Take 1 Capsule by mouth daily.  losartan-hydroCHLOROthiazide (HYZAAR) 100-25 mg per tablet 90 Tablet 1     Sig: Take 1 tab by mouth daily for blood pressure.  simvastatin (ZOCOR) 40 mg tablet 90 Tablet 3     Sig: Take 1 Tablet by mouth every evening.  levothyroxine (SYNTHROID) 112 mcg tablet 90 Tablet 3     Sig: Take 1 Tablet by mouth Daily (before breakfast).

## 2022-05-12 ENCOUNTER — HOSPITAL ENCOUNTER (OUTPATIENT)
Dept: INFUSION THERAPY | Age: 77
End: 2022-05-12

## 2022-05-12 ENCOUNTER — APPOINTMENT (OUTPATIENT)
Dept: INFUSION THERAPY | Age: 77
End: 2022-05-12

## 2022-05-18 ENCOUNTER — HOSPITAL ENCOUNTER (OUTPATIENT)
Dept: INFUSION THERAPY | Age: 77
End: 2022-05-18

## 2022-05-19 DIAGNOSIS — M81.6 LOCALIZED OSTEOPOROSIS WITHOUT CURRENT PATHOLOGICAL FRACTURE: Primary | ICD-10-CM

## 2022-05-19 RX ORDER — SODIUM CHLORIDE 0.9 % (FLUSH) 0.9 %
10 SYRINGE (ML) INJECTION AS NEEDED
Status: CANCELLED | OUTPATIENT
Start: 2022-05-19

## 2022-05-19 RX ORDER — HYDROCORTISONE SODIUM SUCCINATE 100 MG/2ML
100 INJECTION, POWDER, FOR SOLUTION INTRAMUSCULAR; INTRAVENOUS AS NEEDED
Status: CANCELLED | OUTPATIENT
Start: 2022-05-19

## 2022-05-19 RX ORDER — DIPHENHYDRAMINE HYDROCHLORIDE 50 MG/ML
25 INJECTION, SOLUTION INTRAMUSCULAR; INTRAVENOUS AS NEEDED
Status: CANCELLED
Start: 2022-05-19

## 2022-05-19 RX ORDER — DIPHENHYDRAMINE HYDROCHLORIDE 50 MG/ML
50 INJECTION, SOLUTION INTRAMUSCULAR; INTRAVENOUS AS NEEDED
Status: CANCELLED
Start: 2022-05-19

## 2022-05-19 RX ORDER — HEPARIN 100 UNIT/ML
300-500 SYRINGE INTRAVENOUS AS NEEDED
Status: CANCELLED
Start: 2022-05-19

## 2022-05-19 RX ORDER — SODIUM CHLORIDE 9 MG/ML
10 INJECTION INTRAMUSCULAR; INTRAVENOUS; SUBCUTANEOUS AS NEEDED
Status: CANCELLED | OUTPATIENT
Start: 2022-05-19

## 2022-05-19 RX ORDER — ZOLEDRONIC ACID 5 MG/100ML
5 INJECTION, SOLUTION INTRAVENOUS ONCE
Status: CANCELLED | OUTPATIENT
Start: 2022-05-19 | End: 2022-05-19

## 2022-05-19 RX ORDER — ONDANSETRON 2 MG/ML
8 INJECTION INTRAMUSCULAR; INTRAVENOUS AS NEEDED
Status: CANCELLED | OUTPATIENT
Start: 2022-05-19

## 2022-05-19 RX ORDER — SODIUM CHLORIDE 9 MG/ML
25 INJECTION, SOLUTION INTRAVENOUS CONTINUOUS
Status: CANCELLED | OUTPATIENT
Start: 2022-05-19

## 2022-05-19 RX ORDER — ACETAMINOPHEN 325 MG/1
650 TABLET ORAL AS NEEDED
Status: CANCELLED
Start: 2022-05-19

## 2022-05-19 RX ORDER — ALBUTEROL SULFATE 0.83 MG/ML
2.5 SOLUTION RESPIRATORY (INHALATION) AS NEEDED
Status: CANCELLED
Start: 2022-05-19

## 2022-06-01 ENCOUNTER — OFFICE VISIT (OUTPATIENT)
Dept: FAMILY MEDICINE CLINIC | Age: 77
End: 2022-06-01
Payer: MEDICARE

## 2022-06-01 VITALS
DIASTOLIC BLOOD PRESSURE: 66 MMHG | OXYGEN SATURATION: 98 % | HEART RATE: 71 BPM | TEMPERATURE: 98.1 F | SYSTOLIC BLOOD PRESSURE: 119 MMHG

## 2022-06-01 DIAGNOSIS — R30.0 DYSURIA: ICD-10-CM

## 2022-06-01 DIAGNOSIS — N30.90 CYSTITIS: Primary | ICD-10-CM

## 2022-06-01 LAB
BILIRUB UR QL STRIP: NEGATIVE
GLUCOSE UR-MCNC: NEGATIVE MG/DL
KETONES P FAST UR STRIP-MCNC: NEGATIVE MG/DL
PH UR STRIP: 7 [PH] (ref 4.6–8)
PROT UR QL STRIP: NEGATIVE
SP GR UR STRIP: 1.01 (ref 1–1.03)
UA UROBILINOGEN AMB POC: NORMAL (ref 0.2–1)
URINALYSIS CLARITY POC: CLEAR
URINALYSIS COLOR POC: YELLOW
URINE BLOOD POC: NEGATIVE
URINE LEUKOCYTES POC: NORMAL
URINE NITRITES POC: NEGATIVE

## 2022-06-01 PROCEDURE — 81001 URINALYSIS AUTO W/SCOPE: CPT | Performed by: NURSE PRACTITIONER

## 2022-06-01 PROCEDURE — G8417 CALC BMI ABV UP PARAM F/U: HCPCS | Performed by: NURSE PRACTITIONER

## 2022-06-01 PROCEDURE — G8536 NO DOC ELDER MAL SCRN: HCPCS | Performed by: NURSE PRACTITIONER

## 2022-06-01 PROCEDURE — G8752 SYS BP LESS 140: HCPCS | Performed by: NURSE PRACTITIONER

## 2022-06-01 PROCEDURE — G9717 DOC PT DX DEP/BP F/U NT REQ: HCPCS | Performed by: NURSE PRACTITIONER

## 2022-06-01 PROCEDURE — G8754 DIAS BP LESS 90: HCPCS | Performed by: NURSE PRACTITIONER

## 2022-06-01 PROCEDURE — 99213 OFFICE O/P EST LOW 20 MIN: CPT | Performed by: NURSE PRACTITIONER

## 2022-06-01 PROCEDURE — 1101F PT FALLS ASSESS-DOCD LE1/YR: CPT | Performed by: NURSE PRACTITIONER

## 2022-06-01 PROCEDURE — 1123F ACP DISCUSS/DSCN MKR DOCD: CPT | Performed by: NURSE PRACTITIONER

## 2022-06-01 PROCEDURE — 1090F PRES/ABSN URINE INCON ASSESS: CPT | Performed by: NURSE PRACTITIONER

## 2022-06-01 PROCEDURE — G8427 DOCREV CUR MEDS BY ELIG CLIN: HCPCS | Performed by: NURSE PRACTITIONER

## 2022-06-01 RX ORDER — NITROFURANTOIN 25; 75 MG/1; MG/1
100 CAPSULE ORAL 2 TIMES DAILY
Qty: 14 CAPSULE | Refills: 0 | Status: SHIPPED | OUTPATIENT
Start: 2022-06-01 | End: 2022-06-08

## 2022-06-01 RX ORDER — ZOLEDRONIC ACID 5 MG/100ML
5 INJECTION, SOLUTION INTRAVENOUS ONCE
Status: CANCELLED | OUTPATIENT
Start: 2022-06-01 | End: 2022-06-01

## 2022-06-01 NOTE — PROGRESS NOTES
[place] [facility] Note  Subjective:      Modesta Roldan is a 65 y.o. female who presents for dysuria and left flanks pain x 3 days. She states that she get frequent urinary tract infections. Denies chills, fever, abdominal pian. Her friend that cleans her house brought her,     Past Medical History:   Diagnosis Date    Arthritis     Asthma     had it while she smoked cigaretts    Autoimmune disease ([address] 2004    pt has MS    Burning with urination     CAD (coronary artery disease) 8/7/2012    Chronic pain     Depression     Dyspepsia and other specified disorders of function of stomach     GERD (gastroesophageal reflux disease)     Glaucoma     Glaucoma 8/7/2012    Hx of migraine headaches 8/7/2012    Hyperlipidemia 8/7/2012    Hypertension     Hypothyroidism 8/7/2012    IFG (impaired fasting glucose) 8/7/2012    Lymphocytic colitis 8/7/2012    Morbid obesity ([address]     Multiple sclerosis ([address]     Normal cardiac stress test 8/7/2012    Osteoarthritis 8/7/2012    Other ill-defined conditions(799.89)     COLITIS    Psoriasis 8/7/2012    Psychiatric disorder     anexity depression    Renal stones 8/7/2012    Sunburn, blistering     Thyroid disease      Past Surgical History:   Procedure Laterality Date    HX APPENDECTOMY      and lysis of adhesions    HX BREAST BIOPSY Right     many yrs ago; neg    HX CATARACT REMOVAL      both    HX CHOLECYSTECTOMY  1994    lap    HX KNEE REPLACEMENT      both    HX LYSIS OF ADHESIONS      HX MOHS PROCEDURES  04/19/2017    BCC left infraorbital cheek by Dr. Kang [place]      cyst removal right breast         Current Outpatient Medications   Medication Sig Dispense Refill    nitrofurantoin, macrocrystal-monohydrate, (Macrobid) 100 mg capsule Take 1 Capsule by mouth two (2) times a day.  14 Capsule 0    DULoxetine (CYMBALTA) 30 mg capsule Take 1 Capsule by mouth daily. 90 Capsule 1    losartan-hydroCHLOROthiazide (HYZAAR) 100-25 mg per tablet Take 1 tab by mouth daily for blood pressure. 90 Tablet 1    simvastatin (ZOCOR) 40 mg tablet Take 1 Tablet by mouth every evening. 90 Tablet 3    levothyroxine (SYNTHROID) 112 mcg tablet Take 1 Tablet by mouth Daily (before breakfast). 90 Tablet 3    tramadol HCl (TRAMADOL PO) Take 1 Tablet by mouth as needed.  gabapentin (NEURONTIN) 100 mg capsule Take 1 Capsule by mouth daily as needed for Pain. 90 Capsule 0    buPROPion XL (WELLBUTRIN XL) 150 mg tablet Take 1 Tablet by mouth every morning. 90 Tablet 0    mometasone (ELOCON) 0.1 % topical cream Apply  to affected area daily.  dexAMETHasone (DECADRON) 0.5 mg/5 mL elixir SWISH/SPIT 5ML 4X DAILY prn mouth ulcers 237 mL 4    latanoprost (XALATAN) 0.005 % ophthalmic solution Administer 1 Drop to both eyes daily.  timolol (TIMOPTIC) 0.5 % ophthalmic solution INSTILL 1 DROP INTO INTO BOTH EYES TWICE A DAY  3    brinzolamide (AZOPT) 1 % ophthalmic suspension Administer 1 Drop to both eyes two (2) times a day.  cycloSPORINE (RESTASIS) 0.05 % ophthalmic emulsion Administer 1 Drop to both eyes two (2) times a day.  therapeutic multivitamin (THERAGRAN) tablet Take 1 Tab by mouth daily.  aspirin delayed-release 81 mg tablet Take 81 mg by mouth daily.  cyanocobalamin (VITAMIN B-12) 1,000 mcg tablet Take 1,000 mcg by mouth daily. Allergies   Allergen Reactions    Adhes. Band-Tape-Benzalkonium Rash    Alphagan P [Brimonidine] Other (comments)     Severe reaction and damage to the cornea       ROS:   Complete review of systems was reviewed with pertinent information listed in HPI. Review of Systems   Constitutional: Negative. HENT: Negative. Respiratory: Negative. Cardiovascular: Negative. Gastrointestinal: Negative. Genitourinary: Positive for dysuria and flank pain. Objective:     Visit Vitals  /66 (BP 1 Location: Right arm, BP Patient Position: Sitting, BP Cuff Size: Adult long)   Pulse 71   Temp 98.1 °F (36.7 °C) (Temporal)   SpO2 98%       Vitals and Nurse Documentation reviewed. Physical Exam  Constitutional:       Appearance: Normal appearance. She is obese. HENT:      Mouth/Throat:      Mouth: Mucous membranes are moist.   Cardiovascular:      Rate and Rhythm: Normal rate and regular rhythm. Pulses: Normal pulses. Heart sounds: Normal heart sounds. No murmur heard. Pulmonary:      Effort: Pulmonary effort is normal.      Breath sounds: Normal breath sounds. Abdominal:      General: Bowel sounds are normal.      Palpations: Abdomen is soft. Genitourinary:     Comments: UA is positive for leukocytes  Musculoskeletal:      Cervical back: Normal range of motion and neck supple. Neurological:      Mental Status: She is alert. Psychiatric:         Mood and Affect: Mood normal.         Thought Content: Thought content normal.         Assessment/Plan:     Diagnoses and all orders for this visit:    1. Cystitis  -     nitrofurantoin, macrocrystal-monohydrate, (Macrobid) 100 mg capsule; Take 1 Capsule by mouth two (2) times a day. 2. Dysuria  -     CULTURE, URINE;  Future  -     AMB POC URINALYSIS DIP STICK AUTO W/ MICRO      Await urine culture    Pt expressed understanding with the diagnosis and plan        Discussed expected course/resolution/complications of diagnosis in detail with patient.    Medication risks/benefits/costs/interactions/alternatives discussed with patient.    Pt was given an after visit summary which includes diagnoses, current medications & vitals.  Pt expressed understanding with the diagnosis and plan

## 2022-06-01 NOTE — PROGRESS NOTES
Chief Complaint   Patient presents with    Urinary Frequency     since sunday    Flank Pain     1. Have you been to the ER, urgent care clinic since your last visit? Hospitalized since your last visit? No    2. Have you seen or consulted any other health care providers outside of the [address] [place] [address] since your last visit? Include any pap smears or colon screening.  No

## 2022-06-03 LAB
BACTERIA SPEC CULT: NORMAL
SERVICE CMNT-IMP: NORMAL

## 2022-06-08 ENCOUNTER — TELEPHONE (OUTPATIENT)
Dept: FAMILY MEDICINE CLINIC | Age: 77
End: 2022-06-08

## 2022-06-08 ENCOUNTER — HOSPITAL ENCOUNTER (OUTPATIENT)
Dept: INFUSION THERAPY | Age: 77
End: 2022-06-08

## 2022-06-08 ENCOUNTER — OFFICE VISIT (OUTPATIENT)
Dept: FAMILY MEDICINE CLINIC | Age: 77
End: 2022-06-08
Payer: MEDICARE

## 2022-06-08 VITALS
WEIGHT: 233 LBS | HEIGHT: 63 IN | HEART RATE: 86 BPM | TEMPERATURE: 98.5 F | RESPIRATION RATE: 16 BRPM | SYSTOLIC BLOOD PRESSURE: 136 MMHG | BODY MASS INDEX: 41.29 KG/M2 | DIASTOLIC BLOOD PRESSURE: 70 MMHG | OXYGEN SATURATION: 96 %

## 2022-06-08 DIAGNOSIS — M25.552 LEFT HIP PAIN: ICD-10-CM

## 2022-06-08 DIAGNOSIS — N30.90 CYSTITIS: Primary | ICD-10-CM

## 2022-06-08 LAB
BILIRUB UR QL STRIP: NEGATIVE
GLUCOSE UR-MCNC: NEGATIVE MG/DL
KETONES P FAST UR STRIP-MCNC: NEGATIVE MG/DL
PH UR STRIP: 7 [PH] (ref 4.6–8)
PROT UR QL STRIP: NEGATIVE
SP GR UR STRIP: 1.02 (ref 1–1.03)
UA UROBILINOGEN AMB POC: NORMAL (ref 0.2–1)
URINALYSIS CLARITY POC: CLEAR
URINALYSIS COLOR POC: YELLOW
URINE BLOOD POC: NEGATIVE
URINE LEUKOCYTES POC: NEGATIVE
URINE NITRITES POC: NEGATIVE

## 2022-06-08 PROCEDURE — G9717 DOC PT DX DEP/BP F/U NT REQ: HCPCS | Performed by: FAMILY MEDICINE

## 2022-06-08 PROCEDURE — 99213 OFFICE O/P EST LOW 20 MIN: CPT | Performed by: FAMILY MEDICINE

## 2022-06-08 PROCEDURE — G8752 SYS BP LESS 140: HCPCS | Performed by: FAMILY MEDICINE

## 2022-06-08 PROCEDURE — 81003 URINALYSIS AUTO W/O SCOPE: CPT | Performed by: FAMILY MEDICINE

## 2022-06-08 PROCEDURE — 1101F PT FALLS ASSESS-DOCD LE1/YR: CPT | Performed by: FAMILY MEDICINE

## 2022-06-08 PROCEDURE — 1123F ACP DISCUSS/DSCN MKR DOCD: CPT | Performed by: FAMILY MEDICINE

## 2022-06-08 PROCEDURE — G8417 CALC BMI ABV UP PARAM F/U: HCPCS | Performed by: FAMILY MEDICINE

## 2022-06-08 PROCEDURE — G8427 DOCREV CUR MEDS BY ELIG CLIN: HCPCS | Performed by: FAMILY MEDICINE

## 2022-06-08 PROCEDURE — 1090F PRES/ABSN URINE INCON ASSESS: CPT | Performed by: FAMILY MEDICINE

## 2022-06-08 PROCEDURE — G8754 DIAS BP LESS 90: HCPCS | Performed by: FAMILY MEDICINE

## 2022-06-08 PROCEDURE — G8536 NO DOC ELDER MAL SCRN: HCPCS | Performed by: FAMILY MEDICINE

## 2022-06-08 NOTE — TELEPHONE ENCOUNTER
Called, left vm for pt to return call to office. Message left for daughter that if she can not bring mom inn she can do [facility].

## 2022-06-08 NOTE — PROGRESS NOTES
Jona Colunga  65 y.o. female  1945  [place] [id number]  [id number]     RAMSEY Santos [address]       Encounter Date: 6/8/2022           Established Patient Visit Note: Emmanuel Delatorre MD    Reason for Appointment:  Chief Complaint   Patient presents with    Shoulder Pain     sorness shooting from right soulder blade diagonally to waist on left side; pain is 5/10         History of Present Illness:  History provided by patient    Jona Colunga is a 65 y.o. female who presents to clinic today for:       Cystitis: see visit note from 6/1/22 for past problem history. Patient was treated with macrobid. Urine culture did not grow any bacteria. Patient reports that she does not belive that her symptoms are related to a UTI. Flanks Pain and Shoulder Pain  Duration: 5/28/22  Symptoms: symptoms started with pain in the right shoulder pain traveling down her waist to her left flank and top of waist. She reports that the pain in the shoulder has resolved, but she continues to have pain over the left waist.   Course: she reports that the shoulder does not hurt the way that it did before, but she does endorse having some pain over the waist.   Contirbuting Factors: she has history of abdominal hernia and she is not sure if this is related. Her daughter is concerned that she may have tweaked a muscle in her lower back. She also reports that she used a different pillow than what she normally uses, and this may have caused the pain in her shoulder. She denies any new shortness of breath. She reports having intermittnet constipation for which she uses suppositories. Last BM was yesterday, which she reports as noraml She deneis any abdominal pain, nausea, or other stomach symptoms. She took tylenol, which she reports as helpful. Review of Systems  All other ROS were reviewed and are negative except as discussed in HPI        Allergies: Adhes.  band-tape-benzalkonium and Alphagan p [brimonidine]    Medications:     Current Outpatient Medications:     DULoxetine (CYMBALTA) 30 mg capsule, Take 1 Capsule by mouth daily. , Disp: 90 Capsule, Rfl: 1    losartan-hydroCHLOROthiazide (HYZAAR) 100-25 mg per tablet, Take 1 tab by mouth daily for blood pressure., Disp: 90 Tablet, Rfl: 1    simvastatin (ZOCOR) 40 mg tablet, Take 1 Tablet by mouth every evening., Disp: 90 Tablet, Rfl: 3    levothyroxine (SYNTHROID) 112 mcg tablet, Take 1 Tablet by mouth Daily (before breakfast). , Disp: 90 Tablet, Rfl: 3    tramadol HCl (TRAMADOL PO), Take 1 Tablet by mouth as needed. , Disp: , Rfl:     gabapentin (NEURONTIN) 100 mg capsule, Take 1 Capsule by mouth daily as needed for Pain., Disp: 90 Capsule, Rfl: 0    buPROPion XL (WELLBUTRIN XL) 150 mg tablet, Take 1 Tablet by mouth every morning., Disp: 90 Tablet, Rfl: 0    mometasone (ELOCON) 0.1 % topical cream, Apply  to affected area daily. , Disp: , Rfl:     dexAMETHasone (DECADRON) 0.5 mg/5 mL elixir, SWISH/SPIT 5ML 4X DAILY prn mouth ulcers, Disp: 237 mL, Rfl: 4    latanoprost (XALATAN) 0.005 % ophthalmic solution, Administer 1 Drop to both eyes daily. , Disp: , Rfl:     timolol (TIMOPTIC) 0.5 % ophthalmic solution, INSTILL 1 DROP INTO INTO BOTH EYES TWICE A DAY, Disp: , Rfl: 3    brinzolamide (AZOPT) 1 % ophthalmic suspension, Administer 1 Drop to both eyes two (2) times a day., Disp: , Rfl:     cycloSPORINE (RESTASIS) 0.05 % ophthalmic emulsion, Administer 1 Drop to both eyes two (2) times a day., Disp: , Rfl:     therapeutic multivitamin (THERAGRAN) tablet, Take 1 Tab by mouth daily. , Disp: , Rfl:     aspirin delayed-release 81 mg tablet, Take 81 mg by mouth daily. , Disp: , Rfl:     cyanocobalamin (VITAMIN B-12) 1,000 mcg tablet, Take 1,000 mcg by mouth daily. , Disp: , Rfl:     History  Patient Care Team:  Viry Shi MD as PCP - General (Family Medicine)  Viry Shi MD as PCP - [facility]  Carter Garrett, MD (Cardiovascular Disease Physician)  Raji Weston MD (Neurology)  Gabriel Penaloza MD (Rheumatology Internal Medicine)  Kathya Kimbrough MD (Dermatology Physician)    Past Medical History: she has a past medical history of Arthritis, Asthma, Autoimmune disease ([facility] [address] (2004), Burning with urination, CAD (coronary artery disease) (8/7/2012), Chronic pain, Depression, Dyspepsia and other specified disorders of function of stomach, GERD (gastroesophageal reflux disease), Glaucoma, Glaucoma (8/7/2012), migraine headaches (8/7/2012), Hyperlipidemia (8/7/2012), Hypertension, Hypothyroidism (8/7/2012), IFG (impaired fasting glucose) (8/7/2012), Lymphocytic colitis (8/7/2012), Morbid obesity ([facility] [address] Multiple sclerosis ([facility] [address] Normal cardiac stress test (8/7/2012), Osteoarthritis (8/7/2012), Other ill-defined conditions(799.89), Psoriasis (8/7/2012), Psychiatric disorder, Renal stones (8/7/2012), Sunburn, blistering, and Thyroid disease. She has no past medical history of Anemia NEC, Arsenic suspected exposure, Family history of skin cancer, Radiation exposure, Skin cancer, Sleep apnea, Sun-damaged skin, or Tanning bed exposure. Past Surgical History: she has a past surgical history that includes hx tonsil and adenoidectomy; hx lysis of adhesions; hx knee replacement; pr breast surgery procedure unlisted; hx cataract removal; hx appendectomy; hx cholecystectomy (1994); hx orthopaedic; hx mohs procedure (04/19/2017); and hx breast biopsy (Right). Family Medical History: family history includes Bipolar Disorder in her father; Breast Cancer (age of onset: 43) in her mother; COPD in her mother; Cancer in her sister; Coronary Art Dis in her father, maternal grandfather, and maternal grandmother; Heart Disease in her sister; Hypertension in her father; Lung Disease in her mother. Social History: she reports that she quit smoking about 15 years ago.  She has never used smokeless tobacco. She reports previous alcohol use. She reports current drug use. Drug: Marijuana. Objective:   Visit Vitals  /70 (BP 1 Location: Left upper arm, BP Patient Position: Sitting, BP Cuff Size: Adult)   Pulse 86   Temp 98.5 °F (36.9 °C) (Temporal)   Resp 16   Ht 5' 3\" (1.6 m)   Wt 233 lb (105.7 kg)   SpO2 96%   BMI 41.27 kg/m²     Wt Readings from Last 3 Encounters:   06/08/22 233 lb (105.7 kg)   04/08/22 233 lb 6.4 oz (105.9 kg)   05/12/21 220 lb (99.8 kg)       Physical Exam  Vitals and nursing note reviewed. Constitutional:       General: She is not in acute distress. Appearance: Normal appearance. HENT:      Head: Normocephalic and atraumatic. Nose: Nose normal.      Mouth/Throat:      Mouth: Mucous membranes are moist.   Eyes:      Extraocular Movements: Extraocular movements intact. Conjunctiva/sclera: Conjunctivae normal.      Pupils: Pupils are equal, round, and reactive to light. Cardiovascular:      Rate and Rhythm: Normal rate and regular rhythm. Pulses: Normal pulses. Heart sounds: Normal heart sounds. No murmur heard. No friction rub. No gallop. Pulmonary:      Effort: Pulmonary effort is normal. No respiratory distress. Breath sounds: Normal breath sounds. No wheezing, rhonchi or rales. Musculoskeletal:         General: Normal range of motion. Right shoulder: Normal.      Left shoulder: Normal.      Cervical back: Normal range of motion and neck supple. No rigidity. No muscular tenderness. Right hip: Normal.      Left hip: Tenderness (Left posterior hip, no rashes) present. No deformity, lacerations or crepitus. Normal range of motion. Normal strength. Lymphadenopathy:      Cervical: No cervical adenopathy. Skin:     General: Skin is warm. Coloration: Skin is not jaundiced. Neurological:      General: No focal deficit present. Mental Status: She is alert. Mental status is at baseline. Cranial Nerves: Cranial nerves are intact.       Motor: Motor function is intact. Coordination: Coordination is intact. Psychiatric:         Mood and Affect: Mood normal.         Behavior: Behavior normal.         Thought Content: Thought content normal.         Judgment: Judgment normal.           Assessment & Plan:      ICD-10-CM ICD-9-CM    1. Cystitis  N30.90 595.9 AMB POC URINALYSIS DIP STICK AUTO W/O MICRO      CULTURE, URINE      URINALYSIS W/ RFLX MICROSCOPIC      CULTURE, URINE      URINALYSIS W/ RFLX MICROSCOPIC   2. Left hip pain  M25.552 719.45 XR HIP LT W OR WO PELV 2-3 VWS      REFERRAL TO PHYSICAL THERAPY     · Cystitis: Normal UA today. Will send for culture. · Left Hip Pain: New problem, uncontrolled with TTP over left posterior hip. Will obtain xray to evaluate further. She would like to do home based PT. She will reach out to PT to send orders. Discussed red flag symptoms and reasons to call or go to ED      I have discussed the diagnosis with the patient and the intended plan as seen in the above orders. The patient has received an after-visit summary along with patient information handout. I have discussed medication side effects and warnings with the patient as well. Disposition  Follow-up and Dispositions    · Return in about 4 weeks (around 7/6/2022), or if symptoms worsen or fail to improve.            Lina Tapia MD

## 2022-06-08 NOTE — PROGRESS NOTES
Chief Complaint   Patient presents with    Shoulder Pain     sorness shooting from right soulder blade diagonally to waist on left side; pain is 5/10       1. \"Have you been to the ER, urgent care clinic since your last visit? Hospitalized since your last visit? \" No    2. \"Have you seen or consulted any other health care providers outside of the [address] [place] [address] since your last visit? \" No     3. For patients aged 39-70: Has the patient had a colonoscopy / FIT/ Cologuard? NA - based on age      If the patient is female:    4. For patients aged 41-77: Has the patient had a mammogram within the past 2 years? NA - based on age or sex      11. For patients aged 21-65: Has the patient had a pap smear?  NA - based on age or sex    3 most recent PHQ Screens 6/1/2022   Little interest or pleasure in doing things Not at all   Feeling down, depressed, irritable, or hopeless Not at all   Total Score PHQ 2 0

## 2022-06-08 NOTE — TELEPHONE ENCOUNTER
Called, left vm for pt to return call to office. Message left for patient to schedule with [facility] if unable to come.

## 2022-06-08 NOTE — TELEPHONE ENCOUNTER
Called, spoke to pt. Daughter. Two pt identifiers confirmed. Writer informed her that mom needed to come in to be rechecked. 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    Van Thao LPN

## 2022-06-08 NOTE — TELEPHONE ENCOUNTER
----- Message from Joaquina Eid sent at 6/8/2022  9:14 AM EDT -----  Subject: Message to Provider    QUESTIONS  Information for Provider? Pt sees Dr. Soraya Ulrich & was seen on 6/1 for UTI. Has finished meds & still has burning & back pain. Pt would like to know   if she can get more antibiotics. Pt prefers [facility] [address]. Please   advise  ---------------------------------------------------------------------------  --------------  CALL BACK INFO  What is the best way for the office to contact you? OK to leave message on   voicemail  Preferred Call Back Phone Number? [phone]  ---------------------------------------------------------------------------  --------------  SCRIPT ANSWERS  Relationship to Patient?  Self

## 2022-06-09 LAB
APPEARANCE UR: CLEAR
BACTERIA URNS QL MICRO: ABNORMAL /HPF
BILIRUB UR QL: NEGATIVE
COLOR UR: ABNORMAL
EPITH CASTS URNS QL MICRO: ABNORMAL /LPF
GLUCOSE UR STRIP.AUTO-MCNC: NEGATIVE MG/DL
HGB UR QL STRIP: NEGATIVE
KETONES UR QL STRIP.AUTO: NEGATIVE MG/DL
LEUKOCYTE ESTERASE UR QL STRIP.AUTO: ABNORMAL
NITRITE UR QL STRIP.AUTO: NEGATIVE
PH UR STRIP: 7.5 [PH] (ref 5–8)
PROT UR STRIP-MCNC: NEGATIVE MG/DL
RBC #/AREA URNS HPF: ABNORMAL /HPF (ref 0–5)
SP GR UR REFRACTOMETRY: 1.02 (ref 1–1.03)
UROBILINOGEN UR QL STRIP.AUTO: 0.2 EU/DL (ref 0.2–1)
WBC URNS QL MICRO: ABNORMAL /HPF (ref 0–4)

## 2022-06-09 NOTE — PROGRESS NOTES
Attempted to call patient to follow up on symptoms. No answer. Left VM. UA showing trace LE and 1+ bacteria, but reported as collected at 9:37 today when actually collected yesterday. Will await culture.

## 2022-06-10 LAB
BACTERIA SPEC CULT: NORMAL
SERVICE CMNT-IMP: NORMAL

## 2022-06-15 NOTE — TELEPHONE ENCOUNTER
Called, left vm for pt to return call to office. Message left to let Fiona Escobedo know that I seen that they kept appointment.

## 2022-06-17 RX ORDER — BUPROPION HYDROCHLORIDE 150 MG/1
150 TABLET ORAL
Qty: 90 TABLET | Refills: 1 | Status: SHIPPED | OUTPATIENT
Start: 2022-06-17

## 2022-06-17 NOTE — TELEPHONE ENCOUNTER
Last Visit: 6/8/22 MD Chitra Mitcehll  Next Appointment: None  Previous Refill Encounter(s): 3/15/22 90    Requested Prescriptions     Pending Prescriptions Disp Refills    buPROPion XL (WELLBUTRIN XL) 150 mg tablet 90 Tablet 1     Sig: Take 1 Tablet by mouth every morning.      For Spenser Bolden in place:    Recommendation Provided To:    Intervention Detail: New Rx: 1, reason: Patient Preference   Gap Closed?:    Intervention Accepted By:   Zafar Ortiz Time Spent (min): 1

## 2022-06-21 ENCOUNTER — HOSPITAL ENCOUNTER (OUTPATIENT)
Dept: INFUSION THERAPY | Age: 77
Discharge: HOME | End: 2022-06-21
Payer: MEDICARE

## 2022-06-21 VITALS
WEIGHT: 220 LBS | BODY MASS INDEX: 38.97 KG/M2 | OXYGEN SATURATION: 93 % | DIASTOLIC BLOOD PRESSURE: 56 MMHG | SYSTOLIC BLOOD PRESSURE: 127 MMHG | HEART RATE: 76 BPM | TEMPERATURE: 97.8 F | RESPIRATION RATE: 18 BRPM

## 2022-06-21 DIAGNOSIS — M81.6 LOCALIZED OSTEOPOROSIS WITHOUT CURRENT PATHOLOGICAL FRACTURE: Primary | ICD-10-CM

## 2022-06-21 LAB
ALBUMIN SERPL-MCNC: 3.2 G/DL (ref 3.5–5)
ANION GAP SERPL CALC-SCNC: 4 MMOL/L (ref 5–15)
BUN SERPL-MCNC: 19 MG/DL (ref 6–20)
BUN/CREAT SERPL: 28 (ref 12–20)
CALCIUM SERPL-MCNC: 9.2 MG/DL (ref 8.5–10.1)
CHLORIDE SERPL-SCNC: 109 MMOL/L (ref 97–108)
CO2 SERPL-SCNC: 27 MMOL/L (ref 21–32)
CREAT SERPL-MCNC: 0.68 MG/DL (ref 0.55–1.02)
GLUCOSE SERPL-MCNC: 116 MG/DL (ref 65–100)
PHOSPHATE SERPL-MCNC: 3 MG/DL (ref 2.6–4.7)
POTASSIUM SERPL-SCNC: 3.5 MMOL/L (ref 3.5–5.1)
SODIUM SERPL-SCNC: 140 MMOL/L (ref 136–145)

## 2022-06-21 PROCEDURE — 96374 THER/PROPH/DIAG INJ IV PUSH: CPT

## 2022-06-21 PROCEDURE — 80069 RENAL FUNCTION PANEL: CPT

## 2022-06-21 PROCEDURE — 36415 COLL VENOUS BLD VENIPUNCTURE: CPT

## 2022-06-21 PROCEDURE — 74011250636 HC RX REV CODE- 250/636: Performed by: INTERNAL MEDICINE

## 2022-06-21 PROCEDURE — 74011250636 HC RX REV CODE- 250/636: Performed by: FAMILY MEDICINE

## 2022-06-21 RX ORDER — ZOLEDRONIC ACID 5 MG/100ML
5 INJECTION, SOLUTION INTRAVENOUS ONCE
OUTPATIENT
Start: 2022-07-11 | End: 2022-07-11

## 2022-06-21 RX ORDER — HEPARIN 100 UNIT/ML
300-500 SYRINGE INTRAVENOUS AS NEEDED
Start: 2022-07-11

## 2022-06-21 RX ORDER — HYDROCORTISONE SODIUM SUCCINATE 100 MG/2ML
100 INJECTION, POWDER, FOR SOLUTION INTRAMUSCULAR; INTRAVENOUS AS NEEDED
OUTPATIENT
Start: 2022-07-11

## 2022-06-21 RX ORDER — DIPHENHYDRAMINE HYDROCHLORIDE 50 MG/ML
25 INJECTION, SOLUTION INTRAMUSCULAR; INTRAVENOUS AS NEEDED
Start: 2022-07-11

## 2022-06-21 RX ORDER — SODIUM CHLORIDE 9 MG/ML
10 INJECTION INTRAMUSCULAR; INTRAVENOUS; SUBCUTANEOUS AS NEEDED
OUTPATIENT
Start: 2022-07-11

## 2022-06-21 RX ORDER — ZOLEDRONIC ACID 5 MG/100ML
5 INJECTION, SOLUTION INTRAVENOUS ONCE
Status: COMPLETED | OUTPATIENT
Start: 2022-06-21 | End: 2022-06-21

## 2022-06-21 RX ORDER — ALBUTEROL SULFATE 0.83 MG/ML
2.5 SOLUTION RESPIRATORY (INHALATION) AS NEEDED
Start: 2022-07-11

## 2022-06-21 RX ORDER — SODIUM CHLORIDE 0.9 % (FLUSH) 0.9 %
10 SYRINGE (ML) INJECTION AS NEEDED
OUTPATIENT
Start: 2022-07-11

## 2022-06-21 RX ORDER — SODIUM CHLORIDE 9 MG/ML
25 INJECTION, SOLUTION INTRAVENOUS CONTINUOUS
Status: DISCONTINUED | OUTPATIENT
Start: 2022-06-21 | End: 2022-06-22 | Stop reason: HOSPADM

## 2022-06-21 RX ORDER — SODIUM CHLORIDE 9 MG/ML
25 INJECTION, SOLUTION INTRAVENOUS CONTINUOUS
OUTPATIENT
Start: 2022-07-11

## 2022-06-21 RX ORDER — DIPHENHYDRAMINE HYDROCHLORIDE 50 MG/ML
50 INJECTION, SOLUTION INTRAMUSCULAR; INTRAVENOUS AS NEEDED
Start: 2022-07-11

## 2022-06-21 RX ORDER — ONDANSETRON 2 MG/ML
8 INJECTION INTRAMUSCULAR; INTRAVENOUS AS NEEDED
OUTPATIENT
Start: 2022-07-11

## 2022-06-21 RX ORDER — ACETAMINOPHEN 325 MG/1
650 TABLET ORAL AS NEEDED
Start: 2022-07-11

## 2022-06-21 RX ADMIN — ZOLEDRONIC ACID 5 MG: 5 INJECTION, SOLUTION INTRAVENOUS at 16:10

## 2022-06-21 RX ADMIN — SODIUM CHLORIDE 25 ML/HR: 9 INJECTION, SOLUTION INTRAVENOUS at 16:09

## 2022-06-21 NOTE — PROGRESS NOTES
[facility] Progress Note    Date: June 21, 2022        1430: Pt arrived ambulatory to [facility] for Reclast in stable condition. Assessment completed. PIV placed to the left forearm with positive blood return. Labs drawn and sent for processing. Labs reviewed. Criteria for treatment was met. Patient Vitals for the past 12 hrs:   Temp Pulse Resp BP SpO2   06/21/22 1434 97.8 °F (36.6 °C) 76 18 (!) 127/56 93 %         Medications Administered     0.9% sodium chloride infusion     Admin Date  06/21/2022 Action  New Bag Dose  25 mL/hr Rate  25 mL/hr Route  IntraVENous Administered By  Jose Rodriguez RN          zoledronic acid (RECLAST) IVPB 5 mg     Admin Date  06/21/2022 Action  New Bag Dose  5 mg Rate  400 mL/hr Route  IntraVENous Administered By  Jose Rodriguez RN                   Ms. Zane Loera tolerated the infusion, and had no complaints. PIV flushed and removed. 2x2 and coban placed    Ms. Zane Loera was discharged from [place] [address] in stable condition.          Jose Martin York RN  June 21, 2022

## 2022-06-28 ENCOUNTER — TRANSCRIBE ORDER (OUTPATIENT)
Dept: SCHEDULING | Age: 77
End: 2022-06-28

## 2022-06-28 DIAGNOSIS — Z12.31 VISIT FOR SCREENING MAMMOGRAM: Primary | ICD-10-CM

## 2022-09-30 ENCOUNTER — HOSPITAL ENCOUNTER (OUTPATIENT)
Dept: MAMMOGRAPHY | Age: 77
Discharge: HOME | End: 2022-09-30
Attending: FAMILY MEDICINE
Payer: MEDICARE

## 2022-09-30 DIAGNOSIS — Z12.31 VISIT FOR SCREENING MAMMOGRAM: ICD-10-CM

## 2022-09-30 PROCEDURE — 77063 BREAST TOMOSYNTHESIS BI: CPT

## 2022-10-19 ENCOUNTER — TELEPHONE (OUTPATIENT)
Dept: FAMILY MEDICINE CLINIC | Age: 77
End: 2022-10-19

## 2022-10-19 NOTE — TELEPHONE ENCOUNTER
Patient called stated she received letter in the mail stated she did not go to see podiatric  stated that she already went to see the podiatric.     Best call back [phone]

## 2022-10-19 NOTE — TELEPHONE ENCOUNTER
Called Patient. Spoke with patient daughter Ewa Duarte as per PHI. Patient Name and  confirmed. Informed her that please let your mother know that please disregard that letter. She stated understanding.

## 2022-12-20 RX ORDER — DULOXETIN HYDROCHLORIDE 30 MG/1
30 CAPSULE, DELAYED RELEASE ORAL DAILY
Qty: 90 CAPSULE | Refills: 1 | Status: SHIPPED | OUTPATIENT
Start: 2022-12-20

## 2022-12-20 NOTE — TELEPHONE ENCOUNTER
Chief Complaint   Patient presents with    Medication Refill     Cymbalta      Patient seen on 06/08/22.

## 2023-02-20 RX ORDER — BUPROPION HYDROCHLORIDE 150 MG/1
150 TABLET ORAL
Qty: 90 TABLET | Refills: 1 | Status: SHIPPED | OUTPATIENT
Start: 2023-02-20

## 2023-02-20 NOTE — TELEPHONE ENCOUNTER
Last Visit: 6/8/22 MD Panda Mojica  Next Appointment: None  Previous Refill Encounter(s): 6/17/22 90 + 1    Requested Prescriptions     Pending Prescriptions Disp Refills    buPROPion XL (WELLBUTRIN XL) 150 mg tablet 90 Tablet 1     Sig: Take 1 Tablet by mouth every morning. For Pharmacy Admin Tracking Only    Program: Medication Refill  CPA in place:   Recommendation Provided To:    Intervention Detail: New Rx: 1, reason: Patient Preference  Intervention Accepted By:   Haylie Taylor Closed?:   Time Spent (min): 5

## 2023-03-03 DIAGNOSIS — I10 HYPERTENSION, UNSPECIFIED TYPE: ICD-10-CM

## 2023-03-03 RX ORDER — LOSARTAN POTASSIUM AND HYDROCHLOROTHIAZIDE 25; 100 MG/1; MG/1
TABLET ORAL
Qty: 90 TABLET | Refills: 1 | Status: SHIPPED | OUTPATIENT
Start: 2023-03-03

## 2023-03-03 NOTE — TELEPHONE ENCOUNTER
Last Visit: 6/8/22 MD Kristel Alfonso, wellness 4/2022  Next Appointment: None  Previous Refill Encounter(s): 4/18/22 90 + 1    Requested Prescriptions     Pending Prescriptions Disp Refills    losartan-hydroCHLOROthiazide (HYZAAR) 100-25 mg per tablet 90 Tablet 1     Sig: Take 1 tab by mouth daily for blood pressure. For Pharmacy Admin Tracking Only    Program: Medication Refill  CPA in place:   Recommendation Provided To:    Intervention Detail: New Rx: 1, reason: Patient Preference  Intervention Accepted By:   Les Kuklarni Closed?:   Time Spent (min): 5

## 2023-04-01 DIAGNOSIS — G89.29 CHRONIC BILATERAL LOW BACK PAIN WITH RIGHT-SIDED SCIATICA: ICD-10-CM

## 2023-04-01 DIAGNOSIS — M54.41 CHRONIC BILATERAL LOW BACK PAIN WITH RIGHT-SIDED SCIATICA: ICD-10-CM

## 2023-04-03 RX ORDER — GABAPENTIN 100 MG/1
100 CAPSULE ORAL
Qty: 30 CAPSULE | Refills: 0 | Status: SHIPPED | OUTPATIENT
Start: 2023-04-03

## 2023-04-03 NOTE — TELEPHONE ENCOUNTER
[employer] request for refill Gabapentin. Check . **Advised patient via [employer] message below:    MD Karen Fairbanks has left [facility] and you will need to establish with a new provider. The gabapentin is a controlled medication and your last appointment was 6/8/22 and requires an appointment to refill. (The last prescription provided by MD Karen Fairbanks was 4/8/22 for 90.  (1 year ago). Does another provider refill this medication for you? If so, please contact that provider as well. Please contact the office to schedule appt.:  [phone]. Last Visit: 6/8/22 MD Karen Fairbanks  Next Appointment: None  Previous Refill Encounter(s): 4/8/22 90 (may be covered by another provider now?)    Requested Prescriptions     Pending Prescriptions Disp Refills    gabapentin (NEURONTIN) 100 mg capsule 30 Capsule 0     Sig: Take 1 Capsule by mouth daily as needed for Pain.      For Pharmacy Admin Tracking Only    Program: Medication Refill  Intervention Detail: New Rx: 1, reason: Patient Preference  Time Spent (min): 10

## 2023-04-28 NOTE — TELEPHONE ENCOUNTER
Receiving refill request for simvastatin. Patient is aware Kevin Strong left practice via [employer] message read 4/3/23. Not sure if there has been an appt scheduled? OV and labs due. Thanks, kenyon    Last Visit: 6/8/22 MD Kevin Strnog, labs 4/2022  Next Appointment: None? New Provider/appt/labs needed-Please contact patient. Previous Refill Encounter(s): 4/18/22 90 + 3    Requested Prescriptions     Pending Prescriptions Disp Refills    simvastatin (ZOCOR) 40 mg tablet 30 Tablet 0     Sig: Take 1 Tablet by mouth every evening.        For Pharmacy Admin Tracking Only    Program: Medication Refill  Intervention Detail: New Rx: 1, reason: Patient Preference  Time Spent (min): 5

## 2023-04-29 RX ORDER — SIMVASTATIN 40 MG/1
40 TABLET, FILM COATED ORAL EVERY EVENING
Qty: 30 TABLET | Refills: 0 | Status: SHIPPED | OUTPATIENT
Start: 2023-04-29

## 2023-05-25 RX ORDER — LEVOTHYROXINE SODIUM 112 UG/1
112 TABLET ORAL
Qty: 30 TABLET | Refills: 1 | Status: SHIPPED | OUTPATIENT
Start: 2023-05-25

## 2023-05-25 NOTE — TELEPHONE ENCOUNTER
Dc Koenig ([facility]) called for med refill. Scheduled NTP 6/29/23.      levothyroxine (SYNTHROID) 112 MCG tablet      [facility]/pharmacy [address] [facility]   [address] [facility], [facility] [place]   Phone:  [phone]  Fax:  [phone]

## 2023-05-25 NOTE — TELEPHONE ENCOUNTER
Patient has scheduled NTP per message below.   Thanks, Willian Hood    Last appointment: 6/8/22 MD Saad Hodgson, tsh 4/2022  Next appointment: 6/29/23 El[place]   Previous refill encounter(s): 4/18/22 90 + 3    Requested Prescriptions     Pending Prescriptions Disp Refills    levothyroxine (SYNTHROID) 112 MCG tablet 30 tablet 1     Sig: Take 1 tablet by mouth every morning (before breakfast)     For Pharmacy Admin Tracking Only    Program: Medication Refill  Intervention Detail: New Rx: 1, reason: Patient Preference  Time Spent (min): 5

## 2023-05-29 RX ORDER — SIMVASTATIN 40 MG
TABLET ORAL
Qty: 30 TABLET | Refills: 0 | Status: SHIPPED | OUTPATIENT
Start: 2023-05-29

## 2023-06-23 RX ORDER — LEVOTHYROXINE SODIUM 112 UG/1
TABLET ORAL
Qty: 30 TABLET | Refills: 0 | Status: SHIPPED | OUTPATIENT
Start: 2023-06-23 | End: 2023-07-21

## 2023-06-23 RX ORDER — SIMVASTATIN 40 MG
TABLET ORAL
Qty: 30 TABLET | Refills: 0 | Status: SHIPPED | OUTPATIENT
Start: 2023-06-23

## 2023-06-23 NOTE — TELEPHONE ENCOUNTER
Chief Complaint   Patient presents with    Medication Refill     Last Office visit 06/08/2022  Next follow Up 06/29/2023

## 2023-06-27 RX ORDER — DULOXETIN HYDROCHLORIDE 30 MG/1
30 CAPSULE, DELAYED RELEASE ORAL DAILY
Qty: 30 CAPSULE | Refills: 0 | Status: SHIPPED | OUTPATIENT
Start: 2023-06-27

## 2023-06-29 SDOH — ECONOMIC STABILITY: FOOD INSECURITY: WITHIN THE PAST 12 MONTHS, YOU WORRIED THAT YOUR FOOD WOULD RUN OUT BEFORE YOU GOT MONEY TO BUY MORE.: NEVER TRUE

## 2023-06-29 SDOH — ECONOMIC STABILITY: INCOME INSECURITY: HOW HARD IS IT FOR YOU TO PAY FOR THE VERY BASICS LIKE FOOD, HOUSING, MEDICAL CARE, AND HEATING?: NOT HARD AT ALL

## 2023-06-29 SDOH — ECONOMIC STABILITY: FOOD INSECURITY: WITHIN THE PAST 12 MONTHS, THE FOOD YOU BOUGHT JUST DIDN'T LAST AND YOU DIDN'T HAVE MONEY TO GET MORE.: NEVER TRUE

## 2023-06-29 SDOH — ECONOMIC STABILITY: HOUSING INSECURITY
IN THE LAST 12 MONTHS, WAS THERE A TIME WHEN YOU DID NOT HAVE A STEADY PLACE TO SLEEP OR SLEPT IN A SHELTER (INCLUDING NOW)?: NO

## 2023-06-29 SDOH — ECONOMIC STABILITY: TRANSPORTATION INSECURITY
IN THE PAST 12 MONTHS, HAS LACK OF TRANSPORTATION KEPT YOU FROM MEETINGS, WORK, OR FROM GETTING THINGS NEEDED FOR DAILY LIVING?: NO

## 2023-07-19 RX ORDER — SIMVASTATIN 40 MG
TABLET ORAL
Qty: 30 TABLET | Refills: 0 | Status: SHIPPED | OUTPATIENT
Start: 2023-07-19 | End: 2023-08-14

## 2023-07-20 ENCOUNTER — OFFICE VISIT (OUTPATIENT)
Age: 78
End: 2023-07-20
Payer: MEDICARE

## 2023-07-20 VITALS
TEMPERATURE: 98 F | BODY MASS INDEX: 39.02 KG/M2 | HEART RATE: 81 BPM | OXYGEN SATURATION: 97 % | HEIGHT: 63 IN | SYSTOLIC BLOOD PRESSURE: 123 MMHG | WEIGHT: 220.2 LBS | DIASTOLIC BLOOD PRESSURE: 75 MMHG | RESPIRATION RATE: 16 BRPM

## 2023-07-20 DIAGNOSIS — E66.01 SEVERE OBESITY (BMI 35.0-39.9) WITH COMORBIDITY (HCC): ICD-10-CM

## 2023-07-20 DIAGNOSIS — M81.0 AGE-RELATED OSTEOPOROSIS WITHOUT CURRENT PATHOLOGICAL FRACTURE: ICD-10-CM

## 2023-07-20 DIAGNOSIS — E78.2 MIXED HYPERLIPIDEMIA: ICD-10-CM

## 2023-07-20 DIAGNOSIS — G35 MULTIPLE SCLEROSIS (HCC): ICD-10-CM

## 2023-07-20 DIAGNOSIS — I10 PRIMARY HYPERTENSION: ICD-10-CM

## 2023-07-20 DIAGNOSIS — Z76.89 ENCOUNTER TO ESTABLISH CARE: Primary | ICD-10-CM

## 2023-07-20 DIAGNOSIS — Z11.59 NEED FOR HEPATITIS C SCREENING TEST: ICD-10-CM

## 2023-07-20 DIAGNOSIS — E03.9 HYPOTHYROIDISM, UNSPECIFIED TYPE: ICD-10-CM

## 2023-07-20 DIAGNOSIS — B37.2 CANDIDIASIS OF SKIN: ICD-10-CM

## 2023-07-20 DIAGNOSIS — F33.0 MILD EPISODE OF RECURRENT MAJOR DEPRESSIVE DISORDER (HCC): ICD-10-CM

## 2023-07-20 PROCEDURE — G8399 PT W/DXA RESULTS DOCUMENT: HCPCS | Performed by: NURSE PRACTITIONER

## 2023-07-20 PROCEDURE — 3078F DIAST BP <80 MM HG: CPT | Performed by: NURSE PRACTITIONER

## 2023-07-20 PROCEDURE — 1090F PRES/ABSN URINE INCON ASSESS: CPT | Performed by: NURSE PRACTITIONER

## 2023-07-20 PROCEDURE — 3074F SYST BP LT 130 MM HG: CPT | Performed by: NURSE PRACTITIONER

## 2023-07-20 PROCEDURE — 1123F ACP DISCUSS/DSCN MKR DOCD: CPT | Performed by: NURSE PRACTITIONER

## 2023-07-20 PROCEDURE — 99215 OFFICE O/P EST HI 40 MIN: CPT | Performed by: NURSE PRACTITIONER

## 2023-07-20 PROCEDURE — G8427 DOCREV CUR MEDS BY ELIG CLIN: HCPCS | Performed by: NURSE PRACTITIONER

## 2023-07-20 PROCEDURE — 1036F TOBACCO NON-USER: CPT | Performed by: NURSE PRACTITIONER

## 2023-07-20 PROCEDURE — G8417 CALC BMI ABV UP PARAM F/U: HCPCS | Performed by: NURSE PRACTITIONER

## 2023-07-20 RX ORDER — ONDANSETRON 2 MG/ML
8 INJECTION INTRAMUSCULAR; INTRAVENOUS
OUTPATIENT
Start: 2023-07-21

## 2023-07-20 RX ORDER — NYSTATIN 100000 [USP'U]/G
POWDER TOPICAL
Qty: 60 G | Refills: 1 | Status: SHIPPED | OUTPATIENT
Start: 2023-07-20

## 2023-07-20 RX ORDER — FAMOTIDINE 10 MG/ML
20 INJECTION, SOLUTION INTRAVENOUS
OUTPATIENT
Start: 2023-07-21

## 2023-07-20 RX ORDER — EPINEPHRINE 1 MG/ML
0.3 INJECTION, SOLUTION, CONCENTRATE INTRAVENOUS PRN
OUTPATIENT
Start: 2023-07-21

## 2023-07-20 RX ORDER — SODIUM CHLORIDE 9 MG/ML
INJECTION, SOLUTION INTRAVENOUS CONTINUOUS
OUTPATIENT
Start: 2023-07-21

## 2023-07-20 RX ORDER — CEFUROXIME AXETIL 500 MG/1
TABLET ORAL
COMMUNITY
Start: 2023-07-06

## 2023-07-20 RX ORDER — ANORECTAL OINTMENT 15.7; .44; 24; 20.6 G/100G; G/100G; G/100G; G/100G
OINTMENT TOPICAL DAILY
Qty: 100 G | Refills: 4 | Status: SHIPPED | OUTPATIENT
Start: 2023-07-20 | End: 2023-07-27

## 2023-07-20 RX ORDER — HEPARIN SODIUM (PORCINE) LOCK FLUSH IV SOLN 100 UNIT/ML 100 UNIT/ML
500 SOLUTION INTRAVENOUS PRN
OUTPATIENT
Start: 2023-07-21

## 2023-07-20 RX ORDER — SODIUM CHLORIDE 9 MG/ML
5-250 INJECTION, SOLUTION INTRAVENOUS PRN
OUTPATIENT
Start: 2023-07-21

## 2023-07-20 RX ORDER — ACETAMINOPHEN 325 MG/1
650 TABLET ORAL
OUTPATIENT
Start: 2023-07-21

## 2023-07-20 RX ORDER — ALBUTEROL SULFATE 90 UG/1
4 AEROSOL, METERED RESPIRATORY (INHALATION) PRN
OUTPATIENT
Start: 2023-07-21

## 2023-07-20 RX ORDER — DIPHENHYDRAMINE HYDROCHLORIDE 50 MG/ML
50 INJECTION INTRAMUSCULAR; INTRAVENOUS
OUTPATIENT
Start: 2023-07-21

## 2023-07-20 RX ORDER — SODIUM CHLORIDE 0.9 % (FLUSH) 0.9 %
5-40 SYRINGE (ML) INJECTION PRN
OUTPATIENT
Start: 2023-07-21

## 2023-07-20 RX ORDER — ZOLEDRONIC ACID 5 MG/100ML
5 INJECTION, SOLUTION INTRAVENOUS ONCE
OUTPATIENT
Start: 2023-07-21 | End: 2023-07-21

## 2023-07-20 RX ORDER — NYSTATIN 100000 U/G
CREAM TOPICAL
Qty: 30 G | Refills: 1 | Status: SHIPPED | OUTPATIENT
Start: 2023-07-20

## 2023-07-20 SDOH — ECONOMIC STABILITY: INCOME INSECURITY: HOW HARD IS IT FOR YOU TO PAY FOR THE VERY BASICS LIKE FOOD, HOUSING, MEDICAL CARE, AND HEATING?: NOT HARD AT ALL

## 2023-07-20 SDOH — ECONOMIC STABILITY: FOOD INSECURITY: WITHIN THE PAST 12 MONTHS, THE FOOD YOU BOUGHT JUST DIDN'T LAST AND YOU DIDN'T HAVE MONEY TO GET MORE.: NEVER TRUE

## 2023-07-20 SDOH — ECONOMIC STABILITY: FOOD INSECURITY: WITHIN THE PAST 12 MONTHS, YOU WORRIED THAT YOUR FOOD WOULD RUN OUT BEFORE YOU GOT MONEY TO BUY MORE.: NEVER TRUE

## 2023-07-20 ASSESSMENT — PATIENT HEALTH QUESTIONNAIRE - PHQ9
SUM OF ALL RESPONSES TO PHQ QUESTIONS 1-9: 5
9. THOUGHTS THAT YOU WOULD BE BETTER OFF DEAD, OR OF HURTING YOURSELF: 0
10. IF YOU CHECKED OFF ANY PROBLEMS, HOW DIFFICULT HAVE THESE PROBLEMS MADE IT FOR YOU TO DO YOUR WORK, TAKE CARE OF THINGS AT HOME, OR GET ALONG WITH OTHER PEOPLE: 1
7. TROUBLE CONCENTRATING ON THINGS, SUCH AS READING THE NEWSPAPER OR WATCHING TELEVISION: 1
SUM OF ALL RESPONSES TO PHQ QUESTIONS 1-9: 5
8. MOVING OR SPEAKING SO SLOWLY THAT OTHER PEOPLE COULD HAVE NOTICED. OR THE OPPOSITE, BEING SO FIGETY OR RESTLESS THAT YOU HAVE BEEN MOVING AROUND A LOT MORE THAN USUAL: 0
4. FEELING TIRED OR HAVING LITTLE ENERGY: 3
6. FEELING BAD ABOUT YOURSELF - OR THAT YOU ARE A FAILURE OR HAVE LET YOURSELF OR YOUR FAMILY DOWN: 0
SUM OF ALL RESPONSES TO PHQ QUESTIONS 1-9: 5
1. LITTLE INTEREST OR PLEASURE IN DOING THINGS: 0
3. TROUBLE FALLING OR STAYING ASLEEP: 1
SUM OF ALL RESPONSES TO PHQ9 QUESTIONS 1 & 2: 0
2. FEELING DOWN, DEPRESSED OR HOPELESS: 0
SUM OF ALL RESPONSES TO PHQ QUESTIONS 1-9: 5
5. POOR APPETITE OR OVEREATING: 0

## 2023-07-20 NOTE — PROGRESS NOTES
[facility] Note     Gabriel Thurston (: 1945) is a 77 y.o. female, established patient, here for evaluation of the following chief complaint(s):  Establish Care       ASSESSMENT/PLAN:  1. Encounter to establish care    2. Multiple sclerosis (HCC)  Comments:  relapsing, remitting multiple sclerosis, , with brain and C-spine lesions. Followed by Dr. Estevan Becerra  Orders:  -     [facility] and [facility]    3. Mild episode of recurrent major depressive disorder (HCC)  -Stable  On duloxetine 30 mg daily and bupropion 150 mg daily    4. Severe obesity (BMI 35.0-39. 9) with comorbidity ([address] [facility])    5. Age-related osteoporosis without current pathological fracture  -New order for Reclast submitted to [facility]  -Previously managed by rheumatology  -DEXA scan 2020 confirmed osteoporosis, plan to reimage next year    6. Candidiasis of skin  -     nystatin (MYCOSTATIN) 246145 UNIT/GM cream; Apply topically 2 times daily. , Disp-30 g, R-1, Normal  -     menthol-zinc oxide (CALMOSEPTINE) 0.44-20.6 % OINT ointment; Apply topically daily for 7 days Max 30 ml per day., Disp-100 g, R-4Normal  -     hydrocortisone 2.5 % cream; Apply topically 2 times daily. , Disp-20 g, R-1, Normal  -     nystatin (MYCOSTATIN) 877112 UNIT/GM powder; Apply 3 times daily. , Disp-60 g, R-1, Normal  -Allow air when possible      7. Primary hypertension  -     Comprehensive Metabolic Panel; Future  -Controlled on losartan-hydrochlorothiazide, no dose adjustment today    8. Hypothyroidism, unspecified type  -     TSH; Future  -     T4, Free; Future  -Last TSH on 22 1.90    9. Mixed hyperlipidemia  -     Lipid Panel; Future    10. Need for hepatitis C screening test  -     Hepatitis C Antibody; Future        Return in about 4 weeks (around 2023) for [address] [place] [address].     On this date 2023 I have spent 40 minutes reviewing previous notes, test results and face to

## 2023-07-20 NOTE — PROGRESS NOTES
Chief Complaint   Patient presents with    Establish Care         1. \"Have you been to the ER, urgent care clinic since your last visit? Hospitalized since your last visit? \" Yes 4 visits to [facility] for UTIs    2. \"Have you seen or consulted any other health care providers outside of the [address] [place] [address] since your last visit? \" Yes dr Greg Briggs urology, [facility]    3. For patients age 43-73: Has the patient had a colorectal cancer screening? N/A     If the patient is female:    4. For patients age 52-65: Has the patient had a mammogram? N/A    5. For patients age 21-65: Has the patient had a pap smear? N/A     PHQ-9  7/20/2023   Little interest or pleasure in doing things 0   Little interest or pleasure in doing things -   Feeling down, depressed, or hopeless 0   Trouble falling or staying asleep, or sleeping too much 1   Feeling tired or having little energy 3   Poor appetite or overeating 0   Feeling bad about yourself - or that you are a failure or have let yourself or your family down 0   Trouble concentrating on things, such as reading the newspaper or watching television 1   Moving or speaking so slowly that other people could have noticed. Or the opposite - being so fidgety or restless that you have been moving around a lot more than usual 0   Thoughts that you would be better off dead, or of hurting yourself in some way 0   PHQ-2 Score 0   Total Score PHQ 2 -   PHQ-9 Total Score 5   If you checked off any problems, how difficult have these problems made it for you to do your work, take care of things at home, or get along with other people?  1       Health Maintenance Due   Topic Date Due    Hepatitis C screen  Never done    DTaP/Tdap/Td vaccine (2 - Tdap) 02/28/2017    Shingles vaccine (2 of 2) 05/01/2019    COVID-19 Vaccine (3 - Booster for Pfizer series) 05/10/2021    Lipids  04/08/2023    Annual Wellness Visit (AWV)  04/09/2023    Depression Monitoring  06/08/2023

## 2023-07-20 NOTE — PATIENT INSTRUCTIONS
GREERS GOO: The classic version consists of nystatin powder (4 million units), hydrocortisone powder (1.2 g, which equates to slightly less than 1%), and zinc oxide paste (4 oz). 1 It typically requires pharmacy compounding.  Despite the goo's relatively inexpensive components, pharmacy compounding can increase its price to a point that some patients find challenging

## 2023-07-21 LAB
ALBUMIN SERPL-MCNC: 3.6 G/DL (ref 3.5–5)
ALBUMIN/GLOB SERPL: 1.3 (ref 1.1–2.2)
ALP SERPL-CCNC: 54 U/L (ref 45–117)
ALT SERPL-CCNC: 23 U/L (ref 12–78)
ANION GAP SERPL CALC-SCNC: 3 MMOL/L (ref 5–15)
AST SERPL-CCNC: 15 U/L (ref 15–37)
BILIRUB SERPL-MCNC: 0.3 MG/DL (ref 0.2–1)
BUN SERPL-MCNC: 18 MG/DL (ref 6–20)
BUN/CREAT SERPL: 32 (ref 12–20)
CALCIUM SERPL-MCNC: 9.4 MG/DL (ref 8.5–10.1)
CHLORIDE SERPL-SCNC: 106 MMOL/L (ref 97–108)
CHOLEST SERPL-MCNC: 141 MG/DL
CO2 SERPL-SCNC: 33 MMOL/L (ref 21–32)
CREAT SERPL-MCNC: 0.56 MG/DL (ref 0.55–1.02)
GLOBULIN SER CALC-MCNC: 2.7 G/DL (ref 2–4)
GLUCOSE SERPL-MCNC: 97 MG/DL (ref 65–100)
HCV AB SERPL QL IA: NONREACTIVE
HDLC SERPL-MCNC: 66 MG/DL
HDLC SERPL: 2.1 (ref 0–5)
LDLC SERPL CALC-MCNC: 52.6 MG/DL (ref 0–100)
POTASSIUM SERPL-SCNC: 3.7 MMOL/L (ref 3.5–5.1)
PROT SERPL-MCNC: 6.3 G/DL (ref 6.4–8.2)
SODIUM SERPL-SCNC: 142 MMOL/L (ref 136–145)
T4 FREE SERPL-MCNC: 1.5 NG/DL (ref 0.8–1.5)
TRIGL SERPL-MCNC: 112 MG/DL
TSH SERPL DL<=0.05 MIU/L-ACNC: 0.84 UIU/ML (ref 0.36–3.74)
VLDLC SERPL CALC-MCNC: 22.4 MG/DL

## 2023-07-21 RX ORDER — LEVOTHYROXINE SODIUM 112 UG/1
TABLET ORAL
Qty: 90 TABLET | Refills: 0 | Status: SHIPPED | OUTPATIENT
Start: 2023-07-21 | End: 2023-09-13 | Stop reason: SDUPTHER

## 2023-07-21 ASSESSMENT — ENCOUNTER SYMPTOMS
GASTROINTESTINAL NEGATIVE: 1
RESPIRATORY NEGATIVE: 1

## 2023-07-31 DIAGNOSIS — M54.41 LUMBAGO WITH SCIATICA, RIGHT SIDE: ICD-10-CM

## 2023-08-01 RX ORDER — GABAPENTIN 100 MG/1
CAPSULE ORAL
Qty: 30 CAPSULE | Refills: 0 | Status: SHIPPED | OUTPATIENT
Start: 2023-08-01 | End: 2023-08-31

## 2023-08-14 RX ORDER — SIMVASTATIN 40 MG
TABLET ORAL
Qty: 30 TABLET | Refills: 0 | Status: SHIPPED | OUTPATIENT
Start: 2023-08-14

## 2023-08-18 DIAGNOSIS — M54.9 BACK PAIN WITH SCIATICA: Primary | ICD-10-CM

## 2023-08-18 DIAGNOSIS — M54.30 BACK PAIN WITH SCIATICA: Primary | ICD-10-CM

## 2023-08-22 ENCOUNTER — CLINICAL DOCUMENTATION (OUTPATIENT)
Facility: CLINIC | Age: 78
End: 2023-08-22

## 2023-08-22 NOTE — PROGRESS NOTES
[facility]   (formerly [place])   Phone:  [phone]      Fax:  [phone] [place] [address] [address]    Name:  Lester Lafleur  YOB: 1945    Received Primary Care at Home referral from Georgia Lott NP. The Primary Care at Home census is currently  full and there is a hold on accepting new patients for a few weeks. The Primary Care at Home team with review recent referrals in early September, with a priority for patients that have been referred from within [facility] and are ACO patients. This nurse does not know when the team will be able to accept non-ACO referrals. This nurse will put patient's name on a wait list.    Keyshawn Stone RN, Gerontological Nursing-BC, [facility]  Referral Navigator, Home Based [address] [place] [address] yes

## 2023-08-31 RX ORDER — SIMVASTATIN 40 MG
TABLET ORAL
Qty: 30 TABLET | Refills: 1 | Status: SHIPPED | OUTPATIENT
Start: 2023-08-31

## 2023-08-31 RX ORDER — DULOXETIN HYDROCHLORIDE 30 MG/1
CAPSULE, DELAYED RELEASE ORAL
Qty: 30 CAPSULE | Refills: 0 | Status: SHIPPED | OUTPATIENT
Start: 2023-08-31

## 2023-08-31 RX ORDER — BUPROPION HYDROCHLORIDE 150 MG/1
150 TABLET ORAL EVERY MORNING
Qty: 90 TABLET | Refills: 1 | Status: SHIPPED | OUTPATIENT
Start: 2023-08-31

## 2023-08-31 NOTE — TELEPHONE ENCOUNTER
Last appointment: 7/20/23 NP Justo John  Next appointment: 9/6/23 STEPHEN John  Previous refill encounter(s): 2/20/23 90 + 1    Requested Prescriptions     Pending Prescriptions Disp Refills    buPROPion (WELLBUTRIN XL) 150 MG extended release tablet 90 tablet 1     Sig: Take 1 tablet by mouth every morning     For Pharmacy Admin Tracking Only    Program: Medication Refill  CPA in place:    Recommendation Provided To:    Intervention Detail: New Rx: 1, reason: Patient Preference  Intervention Accepted By:   Mary Ross Closed?:    Time Spent (min): 5

## 2023-08-31 NOTE — TELEPHONE ENCOUNTER
PCP: YAHAIRA Mcclelland NP    Last appt: [unfilled]  Future Appointments   Date Time Provider [address] [place] [address]   9/6/2023  1:30 PM YAHAIRA Roldan NP [facility]       Requested Prescriptions     Pending Prescriptions Disp Refills    DULoxetine (CYMBALTA) 30 MG extended release capsule [Pharmacy Med Name: DULOXETINE HCL DR 30 MG CAP] 30 capsule 0     Sig: TAKE 1 CAPSULE BY MOUTH EVERY DAY

## 2023-08-31 NOTE — TELEPHONE ENCOUNTER
Patient LOV: 7/20/2023 with TSEPHEN Medina  Next OV: 9/6/2023 with STEPHEN Medina  Please send to pharmacy on file.

## 2023-09-01 ENCOUNTER — TELEPHONE (OUTPATIENT)
Facility: CLINIC | Age: 78
End: 2023-09-01

## 2023-09-01 NOTE — TELEPHONE ENCOUNTER
[address] [address] [facility],[address] [facility] at [facility]   (formerly [place])   Phone:  [phone]      Fax:  [phone] [facility], [facility] Richard Lee    Name:  Eduardo Huerta  YOB: 1945    Called mobile number listed for patient to update her on status of Primary Care at Home referral from Juvenal Crigler NP. Patient's daughter Amy Jenkins answered the phone. This nurse informed her that patient has been on a wait list for Primary Care at Home. The team will review her referral at the team meeting on 9/5/23. She says they are interested in Primary Care at Home. She states she is in the car traveling today and requests that this nurse call her back on 9/5/23.       Abel Shah RN, Gerontological Nursing-BC, [facility]  Referral Navigator, Home Based Primary Care & Supportive Services

## 2023-09-05 ENCOUNTER — TELEPHONE (OUTPATIENT)
Facility: CLINIC | Age: 78
End: 2023-09-05

## 2023-09-05 NOTE — TELEPHONE ENCOUNTER
[facility]  Initial Social Work Assessment    Sources of Information:  Pt's dtr, Yaniv Martinez    SOCIAL HISTORY  Living Circumstances: Pt lives in home with dtr, Marita Junior, and Javon's daughter    Current/Type of Housing:  [] Public/subsidized housing  [] Apartment, Is there an elevator:   [] Assisted Living  [x] [place] of Income:    [x] Social Security   [] SSI   [] Pension   [] Employment Income   [] Spouse income   [] Other:    Insurance Information:   [x] Medicare: [facility] Medicare   [] Medicaid   [] [employer]   [] Other:     Are you having trouble paying for things like rent, food, medications? Not at this time    Is there an agency or person who pays your bills? If yes, who? Marita Junior reports that pt still manages her own finances, it gives her a sense of independence    [address]: No concerns, pt has access to food and prepared meals. Marita Vernon states that pt sleeps most of the day when she is home alone so access to food during the day has not been a major issue    [address]: Pt's dtr, Marita Junior, is primary support. Pt has a son in [place] and another child in the [facility] who has a toddler with medical needs, so Marita Junior is the most involved out of pt's children. Marita Junior recognizes that she is feeling caregiver burnout. How often do social supports visit? Pt lives with primary support    Are you involved with any community agencies? Name/Contact: Not at this time    Is or has Adult Protective Services ever been involved? No    In the last 6 months, have you seen a ? Psychiatrist? If yes, they be contacted by [address] [place] [address]? No    COGNITIVE/EMOTIONAL   Mental Status: Unable to assess, \[facility]\""W had conversation over the phone with pt's dtr Marita Junior. How is your memory? Unable to assess    Do you receive help with ADLs? [x] Yes, Who helps you? How many hours/days? Marita Junior is primary caregiver.

## 2023-09-11 ENCOUNTER — TELEPHONE (OUTPATIENT)
Facility: CLINIC | Age: 78
End: 2023-09-11

## 2023-09-11 NOTE — TELEPHONE ENCOUNTER
Called patient to confirm their in home visit with Tameka Del Rosario on Wednesday, 9/13/2023, arrival between 9am-1pm.  Spoke with the patient and also left a VM for Adelso. The patient confirmed the appointment and answered the covid screen questions.  Does anyone in the household have covid NO  Have there been any changes to insurance NO or location NO.

## 2023-09-13 ENCOUNTER — TELEPHONE (OUTPATIENT)
Facility: CLINIC | Age: 78
End: 2023-09-13

## 2023-09-13 ENCOUNTER — OFFICE VISIT (OUTPATIENT)
Facility: CLINIC | Age: 78
End: 2023-09-13
Payer: MEDICARE

## 2023-09-13 VITALS
TEMPERATURE: 98.1 F | HEART RATE: 74 BPM | SYSTOLIC BLOOD PRESSURE: 136 MMHG | DIASTOLIC BLOOD PRESSURE: 80 MMHG | OXYGEN SATURATION: 95 %

## 2023-09-13 DIAGNOSIS — R39.81 FUNCTIONAL INCONTINENCE: ICD-10-CM

## 2023-09-13 DIAGNOSIS — I10 PRIMARY HYPERTENSION: ICD-10-CM

## 2023-09-13 DIAGNOSIS — Z71.89 COUNSELING REGARDING ADVANCE CARE PLANNING AND GOALS OF CARE: ICD-10-CM

## 2023-09-13 DIAGNOSIS — E78.5 HYPERLIPIDEMIA, UNSPECIFIED HYPERLIPIDEMIA TYPE: ICD-10-CM

## 2023-09-13 DIAGNOSIS — H40.89 OTHER SPECIFIED GLAUCOMA, UNSPECIFIED LATERALITY: ICD-10-CM

## 2023-09-13 DIAGNOSIS — G35 MULTIPLE SCLEROSIS (HCC): ICD-10-CM

## 2023-09-13 DIAGNOSIS — E03.9 HYPOTHYROIDISM, UNSPECIFIED TYPE: ICD-10-CM

## 2023-09-13 DIAGNOSIS — M81.0 AGE-RELATED OSTEOPOROSIS WITHOUT CURRENT PATHOLOGICAL FRACTURE: ICD-10-CM

## 2023-09-13 DIAGNOSIS — G89.29 CHRONIC BILATERAL LOW BACK PAIN WITH RIGHT-SIDED SCIATICA: ICD-10-CM

## 2023-09-13 DIAGNOSIS — M54.41 CHRONIC BILATERAL LOW BACK PAIN WITH RIGHT-SIDED SCIATICA: ICD-10-CM

## 2023-09-13 DIAGNOSIS — F33.0 MILD EPISODE OF RECURRENT MAJOR DEPRESSIVE DISORDER (HCC): ICD-10-CM

## 2023-09-13 DIAGNOSIS — Z76.89 ENCOUNTER TO ESTABLISH CARE: Primary | ICD-10-CM

## 2023-09-13 DIAGNOSIS — G25.81 RLS (RESTLESS LEGS SYNDROME): ICD-10-CM

## 2023-09-13 PROBLEM — K13.79 MOUTH SORES: Status: RESOLVED | Noted: 2021-04-06 | Resolved: 2023-09-13

## 2023-09-13 PROBLEM — F12.90 MARIJUANA USE: Status: RESOLVED | Noted: 2018-10-13 | Resolved: 2023-09-13

## 2023-09-13 PROCEDURE — G8417 CALC BMI ABV UP PARAM F/U: HCPCS | Performed by: NURSE PRACTITIONER

## 2023-09-13 PROCEDURE — 1123F ACP DISCUSS/DSCN MKR DOCD: CPT | Performed by: NURSE PRACTITIONER

## 2023-09-13 PROCEDURE — 1036F TOBACCO NON-USER: CPT | Performed by: NURSE PRACTITIONER

## 2023-09-13 PROCEDURE — 3075F SYST BP GE 130 - 139MM HG: CPT | Performed by: NURSE PRACTITIONER

## 2023-09-13 PROCEDURE — 3079F DIAST BP 80-89 MM HG: CPT | Performed by: NURSE PRACTITIONER

## 2023-09-13 PROCEDURE — 1090F PRES/ABSN URINE INCON ASSESS: CPT | Performed by: NURSE PRACTITIONER

## 2023-09-13 PROCEDURE — 99497 ADVNCD CARE PLAN 30 MIN: CPT | Performed by: NURSE PRACTITIONER

## 2023-09-13 PROCEDURE — 99345 HOME/RES VST NEW HIGH MDM 75: CPT | Performed by: NURSE PRACTITIONER

## 2023-09-13 RX ORDER — LEVOTHYROXINE SODIUM 112 UG/1
112 TABLET ORAL
Qty: 90 TABLET | Refills: 1 | Status: SHIPPED | OUTPATIENT
Start: 2023-09-13

## 2023-09-13 RX ORDER — PHENOL 1.4 %
1 AEROSOL, SPRAY (ML) MUCOUS MEMBRANE DAILY
COMMUNITY

## 2023-09-13 RX ORDER — CHLORAL HYDRATE 500 MG
1 CAPSULE ORAL DAILY
COMMUNITY

## 2023-09-13 RX ORDER — PHENAZOPYRIDINE HYDROCHLORIDE 100 MG/1
100 TABLET, FILM COATED ORAL 2 TIMES DAILY PRN
COMMUNITY

## 2023-09-13 RX ORDER — BUPROPION HYDROCHLORIDE 150 MG/1
150 TABLET ORAL EVERY MORNING
Qty: 90 TABLET | Refills: 1 | Status: SHIPPED | OUTPATIENT
Start: 2023-09-13

## 2023-09-13 RX ORDER — TRAMADOL HYDROCHLORIDE 50 MG/1
50 TABLET ORAL 2 TIMES DAILY PRN
Qty: 60 TABLET | Refills: 1 | Status: SHIPPED | OUTPATIENT
Start: 2023-09-13 | End: 2023-11-12

## 2023-09-13 RX ORDER — SIMVASTATIN 40 MG
40 TABLET ORAL EVERY EVENING
Qty: 90 TABLET | Refills: 1 | Status: SHIPPED | OUTPATIENT
Start: 2023-09-13

## 2023-09-13 RX ORDER — NETARSUDIL 0.2 MG/ML
1 SOLUTION/ DROPS OPHTHALMIC; TOPICAL DAILY
COMMUNITY

## 2023-09-13 RX ORDER — LOSARTAN POTASSIUM AND HYDROCHLOROTHIAZIDE 25; 100 MG/1; MG/1
TABLET ORAL
Qty: 90 TABLET | Refills: 1 | Status: SHIPPED | OUTPATIENT
Start: 2023-09-13

## 2023-09-13 RX ORDER — ACETAMINOPHEN 500 MG
1000 TABLET ORAL 3 TIMES DAILY PRN
COMMUNITY

## 2023-09-13 RX ORDER — GABAPENTIN 100 MG/1
100 CAPSULE ORAL
Qty: 90 CAPSULE | Refills: 0 | Status: SHIPPED | OUTPATIENT
Start: 2023-09-13 | End: 2023-12-12

## 2023-09-13 RX ORDER — TRAMADOL HYDROCHLORIDE 50 MG/1
50 TABLET ORAL 2 TIMES DAILY PRN
COMMUNITY
End: 2023-09-13 | Stop reason: SDUPTHER

## 2023-09-13 RX ORDER — DULOXETIN HYDROCHLORIDE 30 MG/1
CAPSULE, DELAYED RELEASE ORAL
Qty: 90 CAPSULE | Refills: 1 | Status: SHIPPED | OUTPATIENT
Start: 2023-09-13

## 2023-09-13 NOTE — ASSESSMENT & PLAN NOTE
Schedule tylenol 1000mg BID, along with tramadol 50mg BID PRN. Will need CSA and UTS at upcoming visit.

## 2023-09-13 NOTE — PROGRESS NOTES
[address] [place] [address]    [phone]      NOTE: [address] [place] [address] is a PROVIDER (MD/NP) based interdisciplinary practice (RN, LCSW) for patients who cannot access (or have a taxing effort) primary / speciality medical care in an office setting. [facility] is NOT [address] [facility] but works with [facility]. when there is a skilled need. Our MD/NPs are integral in Care Transitions; PLEASE CALL [phone] if this patient arrives in the Emergency Department or Hospital.        Date of Current Visit: 23  Patient/Family present for Current Visit: patient, daughter Danyell Saunderset of Care as stated by the patient/family is:      Preferences for hospitalization if that were to be necessary:  [] Patient DOES NOT WANT hospitalization; focus all treatments at home  [x] Patient WOULD WANT hospitalization for potentially reversible causes  Patient prefers hospitalization at: [facility] (: 1945) is a 77 y.o. female, patient, here for evaluation of the following chief complaint(s):  New Patient       ASSESSMENT/PLAN:  Below is the assessment and plan developed based on review of pertinent history, physical exam, labs, studies, and medications. 1. Encounter to establish care  2. Multiple sclerosis ([address])  Assessment & Plan:   No longer following with neurology. Refer to PT/OT/SLP for strengthening, equipment recommendations, and swallow evaluation. Schedule tylenol 1000mg BID & tramadol 50mg BID PRN pain. Orders:  -     diclofenac sodium (VOLTAREN) 1 % GEL; Apply 4 g topically 4 times daily as needed for Pain, Topical, 4 TIMES DAILY PRN Starting Wed 2023, Disp-350 g, R-1, Normal  -     gabapentin (NEURONTIN) 100 MG capsule; Take 1 capsule by mouth nightly for 90 days. Max Daily Amount: 100 mg, Disp-90 capsule, R-0Normal  -     traMADol (ULTRAM) 50 MG tablet;  Take 1 tablet by mouth 2 times daily as needed for Pain for up to 60

## 2023-09-13 NOTE — ACP (ADVANCE CARE PLANNING)
Advance Care Planning     General Advance Care Planning (ACP) Conversation    Date of Conversation: 9/13/2023  Conducted with: Patient with Decision Making Capacity   Healthcare Decision Maker: Named in Advance Directive or Healthcare Power of  (name) [address] Decision Maker:    Primary Decision Maker: Lo Gonzales - Child - [phone]    Secondary Decision Maker: Shaila Vicente - Child - [phone]  Click here to complete Healthcare Decision Makers including selection of the Healthcare Decision Maker Relationship (ie \"Primary\"). Today we  completed AMD and documented decision-makers consistent with AMD.    Content/Action Overview:  Complete new AMD.  Reviewed DNR/DNI and patient elects Full Code (Attempt Resuscitation)  treatment goals, benefit/burden of treatment options, artificial nutrition, ventilation preferences, hospitalization preferences, resuscitation preferences, and end of life care preferences (vegetative state/imminent death)  For now, patient wishes to have full interventions but is clear that she does not want to be kept alive by machines if her death is imminent. AMD completed. Will revisit code status periodically as condition changes.      Length of Voluntary ACP Conversation in minutes:  25 minutes    Helena Mcardle, APRN - NP

## 2023-09-13 NOTE — ASSESSMENT & PLAN NOTE
Currently well-controlled on duloxetine 30mg daily and wellbutrin XL 150mg daily. No changes.  Could consider music therapy

## 2023-09-13 NOTE — TELEPHONE ENCOUNTER
Returned call to Bradenyogesh. Phone call was in error, I apologized. She verbalized understanding.   YAHAIRA Avila - STEPHEN

## 2023-09-13 NOTE — ASSESSMENT & PLAN NOTE
No longer following with neurology. Refer to PT/OT/SLP for strengthening, equipment recommendations, and swallow evaluation. Schedule tylenol 1000mg BID & tramadol 50mg BID PRN pain.

## 2023-09-13 NOTE — PROGRESS NOTES
SN joint visit with Zurdo Ordoñez NP    Patient alert, oriented x4, able to provide past medical history and current issues. Patient's daughter, Sharri Ann, present during the visit. Vitals:    /80  HR 74  Temp 36.7 F  PO2 95% on Room air.

## 2023-09-13 NOTE — ASSESSMENT & PLAN NOTE
Last DEXA done Jan 2020. Last Reclast infusion at [facility] June 2022; patient/daughter wish to continue. RN to send order for repeat infusion.

## 2023-09-13 NOTE — TELEPHONE ENCOUNTER
Yesenia Becerra called and said that she had received a call from [facility]. She did not know who the caller was and did not have a voicemail message.   Her CB# [phone]

## 2023-09-14 ENCOUNTER — HOME HEALTH ADMISSION (OUTPATIENT)
Dept: HOME HEALTH SERVICES | Facility: HOME HEALTH | Age: 78
End: 2023-09-14
Payer: MEDICARE

## 2023-09-14 DIAGNOSIS — M81.0 AGE-RELATED OSTEOPOROSIS WITHOUT CURRENT PATHOLOGICAL FRACTURE: Primary | ICD-10-CM

## 2023-09-15 ENCOUNTER — TELEPHONE (OUTPATIENT)
Facility: CLINIC | Age: 78
End: 2023-09-15

## 2023-09-15 DIAGNOSIS — M81.0 AGE-RELATED OSTEOPOROSIS WITHOUT CURRENT PATHOLOGICAL FRACTURE: Primary | ICD-10-CM

## 2023-09-15 NOTE — TELEPHONE ENCOUNTER
Lashaun Duncan called from [address] [place] [address] to [facility] a [facility] dates for the patient.   Her CB# [phone]

## 2023-09-15 NOTE — TELEPHONE ENCOUNTER
Call returned to Tamiko Wilkinson, nurse with [facility] - Provided the verbal order to start of care whenever therapist is available next week, as patient's daughter wants to be present for the first visit, and she has limited availability. Ok to coordinate Eval/Assessment with patient/daughter.

## 2023-09-20 ENCOUNTER — TELEPHONE (OUTPATIENT)
Facility: CLINIC | Age: 78
End: 2023-09-20

## 2023-09-20 NOTE — TELEPHONE ENCOUNTER
I called the patient and reminded her of her AWV with Rafaela Vann, Thursday, 9/21/23, arrival between 12-4pm.  The patient confirmed the visit. I also left a VM for Adelso with the same information.

## 2023-09-21 ENCOUNTER — OFFICE VISIT (OUTPATIENT)
Facility: CLINIC | Age: 78
End: 2023-09-21
Payer: MEDICARE

## 2023-09-21 ENCOUNTER — HOME CARE VISIT (OUTPATIENT)
Facility: HOME HEALTH | Age: 78
End: 2023-09-21
Payer: MEDICARE

## 2023-09-21 ENCOUNTER — TELEPHONE (OUTPATIENT)
Facility: CLINIC | Age: 78
End: 2023-09-21

## 2023-09-21 VITALS
OXYGEN SATURATION: 97 % | HEART RATE: 67 BPM | SYSTOLIC BLOOD PRESSURE: 116 MMHG | DIASTOLIC BLOOD PRESSURE: 74 MMHG | RESPIRATION RATE: 20 BRPM | TEMPERATURE: 98.1 F

## 2023-09-21 DIAGNOSIS — G89.29 CHRONIC BILATERAL LOW BACK PAIN WITH RIGHT-SIDED SCIATICA: ICD-10-CM

## 2023-09-21 DIAGNOSIS — M81.0 AGE-RELATED OSTEOPOROSIS WITHOUT CURRENT PATHOLOGICAL FRACTURE: ICD-10-CM

## 2023-09-21 DIAGNOSIS — G47.00 INSOMNIA, UNSPECIFIED TYPE: ICD-10-CM

## 2023-09-21 DIAGNOSIS — M54.41 CHRONIC BILATERAL LOW BACK PAIN WITH RIGHT-SIDED SCIATICA: ICD-10-CM

## 2023-09-21 DIAGNOSIS — E03.9 HYPOTHYROIDISM, UNSPECIFIED TYPE: ICD-10-CM

## 2023-09-21 DIAGNOSIS — Z00.00 ENCOUNTER FOR ANNUAL WELLNESS VISIT (AWV) IN MEDICARE PATIENT: Primary | ICD-10-CM

## 2023-09-21 DIAGNOSIS — E78.5 HYPERLIPIDEMIA, UNSPECIFIED HYPERLIPIDEMIA TYPE: ICD-10-CM

## 2023-09-21 DIAGNOSIS — F33.0 MILD EPISODE OF RECURRENT MAJOR DEPRESSIVE DISORDER (HCC): ICD-10-CM

## 2023-09-21 DIAGNOSIS — G25.81 RLS (RESTLESS LEGS SYNDROME): ICD-10-CM

## 2023-09-21 DIAGNOSIS — I10 PRIMARY HYPERTENSION: ICD-10-CM

## 2023-09-21 DIAGNOSIS — G35 MULTIPLE SCLEROSIS (HCC): ICD-10-CM

## 2023-09-21 PROCEDURE — 3074F SYST BP LT 130 MM HG: CPT | Performed by: NURSE PRACTITIONER

## 2023-09-21 PROCEDURE — G0439 PPPS, SUBSEQ VISIT: HCPCS | Performed by: NURSE PRACTITIONER

## 2023-09-21 PROCEDURE — 99350 HOME/RES VST EST HIGH MDM 60: CPT | Performed by: NURSE PRACTITIONER

## 2023-09-21 PROCEDURE — 1090F PRES/ABSN URINE INCON ASSESS: CPT | Performed by: NURSE PRACTITIONER

## 2023-09-21 PROCEDURE — 1036F TOBACCO NON-USER: CPT | Performed by: NURSE PRACTITIONER

## 2023-09-21 PROCEDURE — 1123F ACP DISCUSS/DSCN MKR DOCD: CPT | Performed by: NURSE PRACTITIONER

## 2023-09-21 PROCEDURE — G8417 CALC BMI ABV UP PARAM F/U: HCPCS | Performed by: NURSE PRACTITIONER

## 2023-09-21 PROCEDURE — 3078F DIAST BP <80 MM HG: CPT | Performed by: NURSE PRACTITIONER

## 2023-09-21 PROCEDURE — G0151 HHCP-SERV OF PT,EA 15 MIN: HCPCS

## 2023-09-21 RX ORDER — QUETIAPINE FUMARATE 25 MG/1
25 TABLET, FILM COATED ORAL
Qty: 30 TABLET | Refills: 1 | Status: SHIPPED | OUTPATIENT
Start: 2023-09-21

## 2023-09-21 ASSESSMENT — ENCOUNTER SYMPTOMS
CONTUSION: 1
PAIN LOCATION - PAIN QUALITY: ACHE
DYSPNEA ACTIVITY LEVEL: AT REST

## 2023-09-21 NOTE — PROGRESS NOTES
her daughter since she is often home alone. This is improved when she purees her food. SLP evaluation pending. History of hypertension, currently takes losartan-HCTZ 100mg-25mg daily. Reports history of \"abnormal EKG\" but had a normal stress echo thereafter. Also takes simvastatin 40mg nightly for hyperlipidemia. Denies history of CVA or MI; on ASA 81mg daily for prevention. Does not follow with cardiology. Takes levothyroxine 112mcg daily. Unclear etiology of hypothyroidism; no hx of surgery. TFTs at goal July 2023. Takes both cymbalta 30mg daily and wellbutrin XL 150mg daily; was reportedly started on cymbalta at [address] [facility] in 2007, and started on wellbutrin \"when I was having a lot of pity for myself. \" Reports that she has been on this regimen for several years and reports that her mood is generally quite good. Denies depressive symptoms. Has been taking tylenol PM to sleep; after our last visit, trialed gabapentin, which she found to be too sedating and made her feel \"off\", so she stopped it. Also trialed melatonin, but did not find it effective. Willing to try something else, but will take tylenol PM if nothing else helps \"because I can't go 4 days without sleeping. \"     Osteoporosis - DEXA scan last done Jan 2020, consistent with osteoporosis. On annual Reclast infusions, last done June 2023. Patient and daughter wish to continue these at [facility]. Chart review reveals intolerance of oral bisphosphonates. Reclast infusion order now at [facility], awaiting scheduling of appointment. Has been being treated for UTI continuously since April 2023. Being seen by a provider at [facility] and has been on many rounds of antibiotics. Has been incontinent since that time, though occasionally able to make it to the bedside commode. Saw Dr. Nikkie Murphy at [facility] this week and had cystoscopy, which she reports went well.  He took her off the PO antibiotics she has been on continuously since April 2023 and put her on

## 2023-09-21 NOTE — PROGRESS NOTES
daily as needed for Pain      Netarsudil Dimesylate (RHOPRESSA) 0.02 % SOLN Place 1 drop into both eyes daily      calcium carbonate 600 MG TABS tablet Take 1 tablet by mouth daily      Multiple Vitamins-Minerals (OCUVITE ADULT 50+ PO) Take 1 tablet by mouth daily      Omega-3 Fatty Acids (FISH OIL) 1000 MG capsule Take 1 capsule by mouth daily      Lactobacillus (DIGESTIVE HEALTH PROBIOTIC) CAPS Take 1 capsule by mouth daily 100 million daily      buPROPion (WELLBUTRIN XL) 150 MG extended release tablet Take 1 tablet by mouth every morning 90 tablet 1    diclofenac sodium (VOLTAREN) 1 % GEL Apply 4 g topically 4 times daily as needed for Pain 350 g 1    DULoxetine (CYMBALTA) 30 MG extended release capsule TAKE 1 CAPSULE BY MOUTH EVERY DAY 90 capsule 1    levothyroxine (SYNTHROID) 112 MCG tablet Take 1 tablet by mouth every morning (before breakfast) 90 tablet 1    simvastatin (ZOCOR) 40 MG tablet Take 1 tablet by mouth every evening 90 tablet 1    losartan-hydroCHLOROthiazide (HYZAAR) 100-25 MG per tablet Take 1 tab by mouth daily for blood pressure. 90 tablet 1    traMADol (ULTRAM) 50 MG tablet Take 1 tablet by mouth 2 times daily as needed for Pain for up to 60 days. Max Daily Amount: 100 mg 60 tablet 1    nystatin (MYCOSTATIN) 966553 UNIT/GM powder Apply 3 times daily.  (Patient taking differently: Apply 3 times daily  to skin fold areas such as between legs, under stomach folds) 60 g 1    aspirin 81 MG EC tablet Take 1 tablet by mouth daily      brinzolamide (AZOPT) 1 % ophthalmic suspension Place 1 drop into both eyes 2 times daily      cyanocobalamin 1000 MCG tablet Take 1 tablet by mouth daily      cycloSPORINE (RESTASIS) 0.05 % ophthalmic emulsion Place 1 drop into both eyes 2 times daily      latanoprost (XALATAN) 0.005 % ophthalmic solution Place 1 drop into both eyes daily      timolol (TIMOPTIC) 0.5 % ophthalmic solution INSTILL 1 DROP INTO INTO BOTH EYES TWICE A DAY      gabapentin (NEURONTIN) 100 MG

## 2023-09-22 ENCOUNTER — TELEPHONE (OUTPATIENT)
Facility: CLINIC | Age: 78
End: 2023-09-22

## 2023-09-22 NOTE — TELEPHONE ENCOUNTER
Call placed to patient's daughter, responding to \"patient advice request\" message - I spoke with Derrick Coronado and discussed the upcoming visits from [facility] staff: OT, PT and SLP. I also informed that I had contacted Home Health to provided order to ADD SN visit, therefore she would be contacted for that as well. Additionally I encouraged her to call Home Health directly in the future, as I can see the visits, and provide general information, but I would not be able to make changes, as Home Health is a separate department.

## 2023-09-25 ENCOUNTER — HOME CARE VISIT (OUTPATIENT)
Facility: HOME HEALTH | Age: 78
End: 2023-09-25
Payer: MEDICARE

## 2023-09-25 VITALS
HEART RATE: 69 BPM | DIASTOLIC BLOOD PRESSURE: 80 MMHG | TEMPERATURE: 97.4 F | SYSTOLIC BLOOD PRESSURE: 150 MMHG | OXYGEN SATURATION: 96 %

## 2023-09-25 PROCEDURE — G0152 HHCP-SERV OF OT,EA 15 MIN: HCPCS

## 2023-09-25 NOTE — HOME HEALTH
Reason for referral, PMH SUMMARY of clinical condition: Pt is a 78 yo female referred to [facility] by PCP Mabel Guzman NP from [facility] ); PMH includes MS ( dx in 2005 ), urinary incontinence/ frequent UTI's, HTN, HLD, chronic low back pain, depression, hypothyroidism, vision limited d/t glaucoma and macular degeneration    Clinical Assessment/Skilled reason for admission to home health (What this means for the patient overall and need for ongoing skilled care): Pt is a 78 yo female referred to [facility] by PCP d/t decline in function /recent fall. Pt reports a gradual decline in function over the past year, with a more significant decline since April 2023 when she was dx with a UTI that required multiple medications and took 6 months to clear; pt is now incontinent. PLOF: amb with rollator short distances within the home; uses w/c when leaving the home; mod ind in dressing and bathing; able to prepare simple meals for self. ROM is WFL in LUE; R shoulder flexion limited to 90 degrees; paramjit  strength 3-/5. Pt has a BSC, rollator, w/c, transport chair and walk in shower with 3 grab bars, [address] and bath seat. Pt has BSC next to bed, but reports difficulty with transfer. Recommended a drop arm commode; pt in agreement. Pt reports all DME has been purchased OOP; contacted Mabel Guzman NP and she will ask [facility] staff to forward script for drop arm commode to DME company. Pt had a recent fall in shower; pt is now fearful of showering and daugther works during the day. Ed on role of HHA and pt/CG were agreeable to adding. Requested orders for HHA. Modified Barthel Index Score of 55/100, indicating partial dep in ADL's. Pt would benefit from occupational therapy to address safety and ind in ADL's and IADL's, functional mobility, energy conservation and HEP for improved UE function to return to her PLOF. Pt/CG requested frequency of OT visits be 1x/week d/t being overwhelmed with the number of disciplines/ visits involved in her care.

## 2023-09-26 ENCOUNTER — HOME CARE VISIT (OUTPATIENT)
Facility: HOME HEALTH | Age: 78
End: 2023-09-26
Payer: MEDICARE

## 2023-09-26 PROCEDURE — G2168 SVS BY PT IN HOME HEALTH: HCPCS

## 2023-09-26 ASSESSMENT — ENCOUNTER SYMPTOMS: PAIN LOCATION - PAIN QUALITY: ACHY

## 2023-09-27 ENCOUNTER — HOME CARE VISIT (OUTPATIENT)
Dept: HOME HEALTH SERVICES | Facility: HOME HEALTH | Age: 78
End: 2023-09-27
Payer: MEDICARE

## 2023-09-27 ENCOUNTER — HOME CARE VISIT (OUTPATIENT)
Facility: HOME HEALTH | Age: 78
End: 2023-09-27
Payer: MEDICARE

## 2023-09-27 VITALS
SYSTOLIC BLOOD PRESSURE: 110 MMHG | TEMPERATURE: 98.3 F | RESPIRATION RATE: 18 BRPM | DIASTOLIC BLOOD PRESSURE: 68 MMHG | HEART RATE: 74 BPM | OXYGEN SATURATION: 95 %

## 2023-09-27 PROCEDURE — G0153 HHCP-SVS OF S/L PATH,EA 15MN: HCPCS

## 2023-09-27 ASSESSMENT — ENCOUNTER SYMPTOMS: PAIN LOCATION - PAIN QUALITY: ACHING

## 2023-09-28 ENCOUNTER — HOME CARE VISIT (OUTPATIENT)
Dept: HOME HEALTH SERVICES | Facility: HOME HEALTH | Age: 78
End: 2023-09-28
Payer: MEDICARE

## 2023-09-28 VITALS
OXYGEN SATURATION: 96 % | DIASTOLIC BLOOD PRESSURE: 77 MMHG | HEART RATE: 66 BPM | SYSTOLIC BLOOD PRESSURE: 121 MMHG | TEMPERATURE: 98.1 F

## 2023-09-28 ASSESSMENT — ENCOUNTER SYMPTOMS
TROUBLE SWALLOWING: 1
PAIN LOCATION - PAIN QUALITY: STABBING

## 2023-09-28 NOTE — HOME HEALTH
Patients daughter states that patient has not been able to sleep. Requesting a visit at a later time. Will see next week.

## 2023-09-28 NOTE — HOME HEALTH
Reason for referral, [facility] SUMMARY of clinical condition MS w/ c/o of occasional difficulty swallowing prompted referral to speech evaluation    Clinical Assessment/Skilled reason for admission to home health (What this means for the patient overall and need for ongoing skilled care):Pt evaluated foe swallowing baseline safety w/ po intake. Pt tolerated single bites and sips of regular po and thin liquids w/ no s/s of difficulty. No cough, wet vocal quality, oral stasis or any s/s aspiration. Pt makes accomodations w/ po as needed and s well aware of what she can and cannot consume. Pt safe on current regimen and encouraged to contact SLP if any difficuty arises. Diagnosis: MS    Subjective (statement from pt/cg that is relative to why you are there): \"I am glad I don't need you but appreciate your input. \"    Caregiver: relative. Caregiver assists with: Meals, Transportation and Housekeeping Caregiver unable to assist with: Medications, Bathing and ADL. Caregiver is available In the evenings Caregiver is not present at this visit and did not participate with clinician. Medications reconciled and all medications are available in the home this visit. The following education was provided regarding medications: medication interactions and look alike medications. Patient/CG able to demonstrate knowledge through teach back with 100% percent accuracy. A list of reconciled medications has been given to the patient/caregiver .     High risk med teaching was not performed     Patient at risk for falls Yes:   Recommended requesting PT/OT orders due to fall risk YES:   Patient response to recommended requesting of PT/OT orders: agreeable    Interdisciplinary communication with:  PT, OT and  Physician for the purpose of POC collaboration    Written Teaching Material Utilized: N/A    Clinician reviewed orientation to home health booklet with patient/caregiver including agency phone number, agency complaint process,

## 2023-09-29 ENCOUNTER — HOME CARE VISIT (OUTPATIENT)
Dept: HOME HEALTH SERVICES | Facility: HOME HEALTH | Age: 78
End: 2023-09-29
Payer: MEDICARE

## 2023-09-29 NOTE — HOME HEALTH
Subjective: Pt reported increased fatigue today. Falls since last visit No(if yes complete the Fall Tracking Form and include bsrifallreport):   Caregiver involvement changes: none. Home health supplies by type and quantity ordered/delivered this visit include: none. Clinician asked if patient has had any physician contact since last home care visit and patient states: NO  Clinician asked if patient has any new or changed medications and patient states:  NO   If Yes, were medications reconciled? N/A   Was the certifying physician notified of changes in medications? N/A     Clinical assessment (what this visit means for the patient overall and need for ongoing skilled care) and progress or lack of progress towards SPECIFIC goals: Today's visit provided pt with instruction during gait training to increase safety and conserve energy decreasing fall risk. Gait goal has not been met. Written Teaching Material Utilized: N/A    Interdisciplinary communication with: N/A     Discharge planning as follows: When goals are met    Specific plan for next visit: Re-instruct patient/caregiver on gait training.

## 2023-10-02 ENCOUNTER — HOME CARE VISIT (OUTPATIENT)
Facility: HOME HEALTH | Age: 78
End: 2023-10-02
Payer: MEDICARE

## 2023-10-02 PROCEDURE — G0156 HHCP-SVS OF AIDE,EA 15 MIN: HCPCS

## 2023-10-03 ENCOUNTER — HOME CARE VISIT (OUTPATIENT)
Facility: HOME HEALTH | Age: 78
End: 2023-10-03
Payer: MEDICARE

## 2023-10-03 VITALS
SYSTOLIC BLOOD PRESSURE: 130 MMHG | RESPIRATION RATE: 16 BRPM | DIASTOLIC BLOOD PRESSURE: 84 MMHG | TEMPERATURE: 97.8 F | OXYGEN SATURATION: 97 % | HEART RATE: 74 BPM

## 2023-10-03 PROCEDURE — G0151 HHCP-SERV OF PT,EA 15 MIN: HCPCS

## 2023-10-03 ASSESSMENT — ENCOUNTER SYMPTOMS: PAIN LOCATION - PAIN QUALITY: ACHING

## 2023-10-03 NOTE — HOME HEALTH
Subjective: Patient states she is doing ok on antibiotic but wishes she didn't have to be on them  Falls since last visit No(if yes complete the Fall Tracking Form and include bsrifallreport):   Caregiver involvement changes: None  Home health supplies by type and quantity ordered/delivered this visit include: None    Clinician asked if patient has had any physician contact since last home care visit and patient states: NO  Clinician asked if patient has any new or changed medications and patient states:  NO   If Yes, were medications reconciled? N/A   Was the certifying physician notified of changes in medications? N/A     Clinical assessment (what this visit means for the patient overall and need for ongoing skilled care) and progress or lack of progress towards SPECIFIC goals: Patient demonstrates limited strength, requiring continued education on HEP. Goal not met. Patient not safe with ambulation d/t requiring assistance for activity. Gait goal not met. Written Teaching Material Utilized: HEP    Interdisciplinary communication with: Kedar Nolen for the purpose of POC collaboration and Luciana Ulloa, scheduling for nursing schedule    Discharge planning as follows:  Will discharge when the patient has reached their maximum functional potential and maximum safety in their home    Specific plan for next visit: gait and exercise

## 2023-10-04 ENCOUNTER — HOME CARE VISIT (OUTPATIENT)
Facility: HOME HEALTH | Age: 78
End: 2023-10-04
Payer: MEDICARE

## 2023-10-04 PROCEDURE — G0299 HHS/HOSPICE OF RN EA 15 MIN: HCPCS

## 2023-10-04 PROCEDURE — G0152 HHCP-SERV OF OT,EA 15 MIN: HCPCS

## 2023-10-05 ENCOUNTER — HOME CARE VISIT (OUTPATIENT)
Facility: HOME HEALTH | Age: 78
End: 2023-10-05
Payer: MEDICARE

## 2023-10-05 VITALS
SYSTOLIC BLOOD PRESSURE: 130 MMHG | RESPIRATION RATE: 18 BRPM | DIASTOLIC BLOOD PRESSURE: 75 MMHG | HEART RATE: 72 BPM | TEMPERATURE: 97 F | OXYGEN SATURATION: 98 %

## 2023-10-05 VITALS
TEMPERATURE: 97.5 F | DIASTOLIC BLOOD PRESSURE: 80 MMHG | HEART RATE: 67 BPM | SYSTOLIC BLOOD PRESSURE: 110 MMHG | OXYGEN SATURATION: 96 % | RESPIRATION RATE: 18 BRPM

## 2023-10-05 PROCEDURE — G0157 HHC PT ASSISTANT EA 15: HCPCS

## 2023-10-05 ASSESSMENT — ENCOUNTER SYMPTOMS
PAIN LOCATION - PAIN QUALITY: ACHE
STOOL DESCRIPTION: FORMED
PAIN LOCATION - PAIN QUALITY: ACHE

## 2023-10-05 NOTE — HOME HEALTH
Subjective: Patient states she can't get her purewick to work, and shes worried about skin breakdown. Falls since last visit No(if yes complete the Fall Tracking Form and include bsrifallreport):   Caregiver involvement changes: N/a  Home health supplies by type and quantity ordered/delivered this visit include: N/A    Clinician asked if patient has had any physician contact since last home care visit and patient states: NO  Clinician asked if patient has any new or changed medications and patient states:  N/A   If Yes, were medications reconciled? N/A   Was the certifying physician notified of changes in medications? N/A no    Clinical assessment (what this visit means for the patient overall and need for ongoing skilled care) and progress or lack of progress towards SPECIFIC goals: Patient is 77year old female with MS and incontenence and hx of MS, CAD, HTN, Osteoperosis, arthritis, psoriasis, and other diagnosis living in two story home in first floor bedroom with daughter as caregivers evenings and weekends. Medication education done this visit includes aspirin-needs further teaching. Education given to patient on skin break down prevention and purewick use. Patient response to visit includes understanding of the use of purewick and to keep skin clean and dry. Needs continued SN to prevent pressure/moisture related injury, infection/uti, and rehospitalization due to illness/diagnosis. Needs continued SN for education. Written Teaching Material Utilized: N/A    Interdisciplinary communication with: N/A    Discharge planning as follows:  When goals are met    Specific plan for next visit: Follow up on purewick, incontinence education, educate on ASA

## 2023-10-05 NOTE — HOME HEALTH
Subjective: I am doing ok but I have a UTI and I dont have the meds yet. Falls since last visit No(if yes complete the Fall Tracking Form and include bsrifallreport):   Caregiver involvement changes: no changes. Home health supplies by type and quantity ordered/delivered this visit include: NA    Clinician asked if patient has had any physician contact since last home care visit and patient states: NO  Clinician asked if patient has any new or changed medications and patient states:  NO   If Yes, were medications reconciled? NO   Was the certifying physician notified of changes in medications? N/A     Clinical assessment (what this visit means for the patient overall and need for ongoing skilled care) and progress or lack of progress towards SPECIFIC goals: OT session focused on ADL training, safety and UE HEP. pt insturcted on safety with bed tranfers. could use a bed assist rail however with the mattress set up, a bed assist rail likely will not fit and stay secured. conitnued skilled OT needed to improve safety and independence with  ADLS. Written Teaching Material Utilized: RUBY    Interdisciplinary communication with: SIMRAN john for the purpose of POC collaboration    Discharge planning as follows:  Will discharge when the patient has reached their maximum functional potential and maximum safety in their home and When goals are met    Specific plan for next visit: Re-instruct patient/caregiver on ADL training, transfer training

## 2023-10-06 ENCOUNTER — HOME CARE VISIT (OUTPATIENT)
Dept: HOME HEALTH SERVICES | Facility: HOME HEALTH | Age: 78
End: 2023-10-06
Payer: MEDICARE

## 2023-10-06 VITALS
SYSTOLIC BLOOD PRESSURE: 110 MMHG | DIASTOLIC BLOOD PRESSURE: 62 MMHG | OXYGEN SATURATION: 98 % | HEART RATE: 76 BPM | TEMPERATURE: 96.7 F | RESPIRATION RATE: 18 BRPM

## 2023-10-06 NOTE — HOME HEALTH
Subjective: Pt c/o colitis flare up and nausea including diarrhea. Falls since last visit No(if yes complete the Fall Tracking Form and include bsrifallreport):   Caregiver involvement changes: none. Home health supplies by type and quantity ordered/delivered this visit include: none. Clinician asked if patient has had any physician contact since last home care visit and patient states: NO  Clinician asked if patient has any new or changed medications and patient states:  NO   If Yes, were medications reconciled? N/A   Was the certifying physician notified of changes in medications? N/A     Clinical assessment (what this visit means for the patient overall and need for ongoing skilled care) and progress or lack of progress towards SPECIFIC goals: Todays visit provided pt reminders and instruction on how to perform HEP in sitting : PTA educated pt with handout/pics and instructions for HEP B LE ex in sitting: marching, LAQs , ABD x 10 each daily to increase overall strength and decrease fall risk. Pt was able to demonstrate ex and agreed to perform 2 x daily. Pt had difficulty today due to c/o colitis flare up and nausea including diarrhea. Written Teaching Material Utilized: PTA educated pt with handout/pics and instructions for HEP B LE ex in sitting: marching, LAQs , ABD x 10 each daily to increase overall strength and decrease fall risk. Pt was able to demonstrate ex and agreed to perform 2 x daily. Interdisciplinary communication with:  PT for the purpose of POC collaboration    Discharge planning as follows: When goals are met    Specific plan for next visit: Instruct caregiver/patient in gait training.

## 2023-10-09 ENCOUNTER — HOME CARE VISIT (OUTPATIENT)
Facility: HOME HEALTH | Age: 78
End: 2023-10-09
Payer: MEDICARE

## 2023-10-09 PROCEDURE — G0156 HHCP-SVS OF AIDE,EA 15 MIN: HCPCS

## 2023-10-10 ENCOUNTER — HOME CARE VISIT (OUTPATIENT)
Facility: HOME HEALTH | Age: 78
End: 2023-10-10
Payer: MEDICARE

## 2023-10-10 VITALS
DIASTOLIC BLOOD PRESSURE: 70 MMHG | TEMPERATURE: 98 F | RESPIRATION RATE: 18 BRPM | SYSTOLIC BLOOD PRESSURE: 125 MMHG | OXYGEN SATURATION: 97 % | HEART RATE: 69 BPM

## 2023-10-10 PROCEDURE — G0157 HHC PT ASSISTANT EA 15: HCPCS

## 2023-10-10 ASSESSMENT — ENCOUNTER SYMPTOMS: PAIN LOCATION - PAIN QUALITY: ACHING

## 2023-10-10 NOTE — HOME HEALTH
Subjective: Pt c/o diarrhea today. Falls since last visit No(if yes complete the Fall Tracking Form and include bsrifallreport):   Caregiver involvement changes: none. Home health supplies by type and quantity ordered/delivered this visit include: none. Clinician asked if patient has had any physician contact since last home care visit and patient states: NO  Clinician asked if patient has any new or changed medications and patient states:  NO   If Yes, were medications reconciled? NO   Was the certifying physician notified of changes in medications? N/A     Clinical assessment (what this visit means for the patient overall and need for ongoing skilled care) and progress or lack of progress towards SPECIFIC goals: Todays visit allowed pt to participate in B LE therex and received gait training to decrease fall risk. Gait goal has not been met. Written Teaching Material Utilized: N/A    Interdisciplinary communication with: N/A for the purpose of POC collaboration    Discharge planning as follows: When goals are met    Specific plan for next visit: Instruct caregiver/patient in gait training.

## 2023-10-12 ENCOUNTER — HOME CARE VISIT (OUTPATIENT)
Facility: HOME HEALTH | Age: 78
End: 2023-10-12
Payer: MEDICARE

## 2023-10-12 ENCOUNTER — HOME CARE VISIT (OUTPATIENT)
Dept: HOME HEALTH SERVICES | Facility: HOME HEALTH | Age: 78
End: 2023-10-12
Payer: MEDICARE

## 2023-10-12 PROCEDURE — G0152 HHCP-SERV OF OT,EA 15 MIN: HCPCS

## 2023-10-12 PROCEDURE — G2168 SVS BY PT IN HOME HEALTH: HCPCS

## 2023-10-13 ENCOUNTER — HOME CARE VISIT (OUTPATIENT)
Facility: HOME HEALTH | Age: 78
End: 2023-10-13
Payer: MEDICARE

## 2023-10-13 VITALS
DIASTOLIC BLOOD PRESSURE: 68 MMHG | TEMPERATURE: 97.7 F | HEART RATE: 68 BPM | OXYGEN SATURATION: 98 % | SYSTOLIC BLOOD PRESSURE: 120 MMHG | RESPIRATION RATE: 18 BRPM

## 2023-10-13 VITALS
OXYGEN SATURATION: 96 % | DIASTOLIC BLOOD PRESSURE: 60 MMHG | SYSTOLIC BLOOD PRESSURE: 125 MMHG | RESPIRATION RATE: 18 BRPM | HEART RATE: 64 BPM | TEMPERATURE: 98 F

## 2023-10-13 PROCEDURE — G0156 HHCP-SVS OF AIDE,EA 15 MIN: HCPCS

## 2023-10-13 ASSESSMENT — ENCOUNTER SYMPTOMS
PAIN LOCATION - PAIN QUALITY: ACHE, SORE
PAIN LOCATION - PAIN QUALITY: ACHING

## 2023-10-13 NOTE — HOME HEALTH
Subjective: I just don't feel well. I had some diarrhea and come vomiting yesterday. Falls since last visit No(if yes complete the Fall Tracking Form and include bsrifallreport):   Caregiver involvement changes: daughter is caregiver  Home health supplies by type and quantity ordered/delivered this visit include: NA    Clinician asked if patient has had any physician contact since last home care visit and patient states: NO  Clinician asked if patient has any new or changed medications and patient states:  NO   If Yes, were medications reconciled? NO   Was the certifying physician notified of changes in medications? NA    Clinical assessment (what this visit means for the patient overall and need for ongoing skilled care) and progress or lack of progress towards SPECIFIC goals: reports some nauesea and vomiting and diarrhea yesterday but seems to have resolved today. pt engaged well in session despite visit beign eamarlonlier that desired and moving a bit slower. pt continues to need assistance with all care at this time. continue skilled OT. Written Teaching Material Utilized: N/A    Interdisciplinary communication with: N/A     Discharge planning as follows:  Will discharge when the patient has reached their maximum functional potential and maximum safety in their home and When goals are met    Specific plan for next visit: Re-instruct patient/caregiver on ADL training, home safety

## 2023-10-13 NOTE — HOME HEALTH
Subjective: Pt c/o decreased balance today and increased fatigue. Falls since last visit No(if yes complete the Fall Tracking Form and include bsrifallreport):   Caregiver involvement changes: none. Home health supplies by type and quantity ordered/delivered this visit include: none. Clinician asked if patient has had any physician contact since last home care visit and patient states: NO  Clinician asked if patient has any new or changed medications and patient states:  NO   If Yes, were medications reconciled? N/A   Was the certifying physician notified of changes in medications? N/A     Clinical assessment (what this visit means for the patient overall and need for ongoing skilled care) and progress or lack of progress towards SPECIFIC goals: Todays visit allowed us to test std balance act to increase balance. Written Teaching Material Utilized: N/A    Interdisciplinary communication with:  PT for the purpose of POC collaboration    Discharge planning as follows: When goals are met    Specific plan for next visit: Instruct caregiver/patient in gait training outdoors.

## 2023-10-16 ENCOUNTER — HOME CARE VISIT (OUTPATIENT)
Facility: HOME HEALTH | Age: 78
End: 2023-10-16
Payer: MEDICARE

## 2023-10-16 ENCOUNTER — TELEPHONE (OUTPATIENT)
Facility: CLINIC | Age: 78
End: 2023-10-16

## 2023-10-16 PROCEDURE — G0156 HHCP-SVS OF AIDE,EA 15 MIN: HCPCS

## 2023-10-16 NOTE — TELEPHONE ENCOUNTER
Called patient to confirm their in home visit with Delmar Carreon on 10/19/23, arrival between 12-4. Spoke with Eunice Stark, they confirmed the appointment and answered the covid screen questions.  Does anyone in the household have covid No  Have there been any changes to insurance No or location No

## 2023-10-17 ENCOUNTER — HOME CARE VISIT (OUTPATIENT)
Facility: HOME HEALTH | Age: 78
End: 2023-10-17
Payer: MEDICARE

## 2023-10-17 PROCEDURE — G0152 HHCP-SERV OF OT,EA 15 MIN: HCPCS

## 2023-10-18 VITALS
RESPIRATION RATE: 18 BRPM | HEART RATE: 66 BPM | SYSTOLIC BLOOD PRESSURE: 122 MMHG | TEMPERATURE: 97 F | DIASTOLIC BLOOD PRESSURE: 70 MMHG | OXYGEN SATURATION: 96 %

## 2023-10-18 ASSESSMENT — ENCOUNTER SYMPTOMS: PAIN LOCATION - PAIN QUALITY: ACHE

## 2023-10-18 NOTE — HOME HEALTH
Subjective: I am not feeling well. I keep coughing. Falls since last visit No(if yes complete the Fall Tracking Form and include bsrifallreport):   Caregiver involvement changes: no changes  Home health supplies by type and quantity ordered/delivered this visit include: NA    Clinician asked if patient has had any physician contact since last home care visit and patient states: NO  Clinician asked if patient has any new or changed medications and patient states:  NO   If Yes, were medications reconciled? NO   Was the certifying physician notified of changes in medications? NO     Clinical assessment (what this visit means for the patient overall and need for ongoing skilled care) and progress or lack of progress towards SPECIFIC goals: pt reports not feeling well, coughing and \"wheezing\". all vitals WNL. pt instructed on if symptoms worsen, to call dispatch health or call PCP. Pt agreeable to ambualte this date but fatigued easily. continues to need OT to address ADL safety. Written Teaching Material Utilized: RUBY    Interdisciplinary communication with: RUBY    Discharge planning as follows: Will discharge when the patient has reached their maximum functional potential and maximum safety in their home and When goals are met    Specific plan for next visit: Re-instruct patient/caregiver on ADL training, home safety.

## 2023-10-19 ENCOUNTER — OFFICE VISIT (OUTPATIENT)
Facility: CLINIC | Age: 78
End: 2023-10-19
Payer: MEDICARE

## 2023-10-19 ENCOUNTER — HOME CARE VISIT (OUTPATIENT)
Facility: HOME HEALTH | Age: 78
End: 2023-10-19
Payer: MEDICARE

## 2023-10-19 VITALS
OXYGEN SATURATION: 96 % | TEMPERATURE: 98.1 F | DIASTOLIC BLOOD PRESSURE: 88 MMHG | SYSTOLIC BLOOD PRESSURE: 144 MMHG | HEART RATE: 65 BPM | RESPIRATION RATE: 20 BRPM

## 2023-10-19 DIAGNOSIS — G25.81 RLS (RESTLESS LEGS SYNDROME): ICD-10-CM

## 2023-10-19 DIAGNOSIS — J44.9 CHRONIC OBSTRUCTIVE PULMONARY DISEASE, UNSPECIFIED COPD TYPE (HCC): ICD-10-CM

## 2023-10-19 DIAGNOSIS — G89.29 CHRONIC RIGHT SHOULDER PAIN: ICD-10-CM

## 2023-10-19 DIAGNOSIS — M81.0 AGE-RELATED OSTEOPOROSIS WITHOUT CURRENT PATHOLOGICAL FRACTURE: ICD-10-CM

## 2023-10-19 DIAGNOSIS — I10 PRIMARY HYPERTENSION: ICD-10-CM

## 2023-10-19 DIAGNOSIS — M54.41 CHRONIC BILATERAL LOW BACK PAIN WITH RIGHT-SIDED SCIATICA: ICD-10-CM

## 2023-10-19 DIAGNOSIS — M25.50 MULTIPLE JOINT PAIN: ICD-10-CM

## 2023-10-19 DIAGNOSIS — M25.511 CHRONIC RIGHT SHOULDER PAIN: ICD-10-CM

## 2023-10-19 DIAGNOSIS — G89.29 CHRONIC BILATERAL LOW BACK PAIN WITH RIGHT-SIDED SCIATICA: ICD-10-CM

## 2023-10-19 DIAGNOSIS — G35 MULTIPLE SCLEROSIS (HCC): Primary | ICD-10-CM

## 2023-10-19 DIAGNOSIS — E03.9 HYPOTHYROIDISM, UNSPECIFIED TYPE: ICD-10-CM

## 2023-10-19 PROCEDURE — G8484 FLU IMMUNIZE NO ADMIN: HCPCS | Performed by: NURSE PRACTITIONER

## 2023-10-19 PROCEDURE — 99350 HOME/RES VST EST HIGH MDM 60: CPT | Performed by: NURSE PRACTITIONER

## 2023-10-19 PROCEDURE — 3077F SYST BP >= 140 MM HG: CPT | Performed by: NURSE PRACTITIONER

## 2023-10-19 PROCEDURE — 3079F DIAST BP 80-89 MM HG: CPT | Performed by: NURSE PRACTITIONER

## 2023-10-19 PROCEDURE — G0156 HHCP-SVS OF AIDE,EA 15 MIN: HCPCS

## 2023-10-19 PROCEDURE — G8417 CALC BMI ABV UP PARAM F/U: HCPCS | Performed by: NURSE PRACTITIONER

## 2023-10-19 PROCEDURE — 1036F TOBACCO NON-USER: CPT | Performed by: NURSE PRACTITIONER

## 2023-10-19 PROCEDURE — 1123F ACP DISCUSS/DSCN MKR DOCD: CPT | Performed by: NURSE PRACTITIONER

## 2023-10-19 PROCEDURE — 1090F PRES/ABSN URINE INCON ASSESS: CPT | Performed by: NURSE PRACTITIONER

## 2023-10-19 RX ORDER — PREDNISONE 20 MG/1
40 TABLET ORAL DAILY
Qty: 10 TABLET | Refills: 0 | Status: SHIPPED | OUTPATIENT
Start: 2023-10-19 | End: 2023-10-24

## 2023-10-19 NOTE — PROGRESS NOTES
Wt Readings from Last 3 Encounters:   07/20/23 99.9 kg (220 lb 3.2 oz)   06/08/22 105.7 kg (233 lb)   04/08/22 105.9 kg (233 lb 6.4 oz)         Current Outpatient Medications on File Prior to Visit   Medication Sig Dispense Refill    QUEtiapine (SEROQUEL) 25 MG tablet Take 1 tablet by mouth nightly 30 tablet 1    mirabegron (MYRBETRIQ) 50 MG TB24 Take 50 mg by mouth daily      phenazopyridine (PYRIDIUM) 100 MG tablet Take 1 tablet by mouth 2 times daily as needed for Pain      Cranberry 300 MG TABS Take 1 tablet by mouth daily      acetaminophen (TYLENOL) 500 MG tablet Take 2 tablets by mouth 3 times daily as needed for Pain      Netarsudil Dimesylate (RHOPRESSA) 0.02 % SOLN Place 1 drop into both eyes daily      calcium carbonate 600 MG TABS tablet Take 1 tablet by mouth daily      Multiple Vitamins-Minerals (OCUVITE ADULT 50+ PO) Take 1 tablet by mouth daily      Omega-3 Fatty Acids (FISH OIL) 1000 MG capsule Take 1 capsule by mouth daily      Lactobacillus (DIGESTIVE HEALTH PROBIOTIC) CAPS Take 1 capsule by mouth daily 100 million daily      buPROPion (WELLBUTRIN XL) 150 MG extended release tablet Take 1 tablet by mouth every morning 90 tablet 1    diclofenac sodium (VOLTAREN) 1 % GEL Apply 4 g topically 4 times daily as needed for Pain 350 g 1    DULoxetine (CYMBALTA) 30 MG extended release capsule TAKE 1 CAPSULE BY MOUTH EVERY DAY 90 capsule 1    gabapentin (NEURONTIN) 100 MG capsule Take 1 capsule by mouth nightly for 90 days. Max Daily Amount: 100 mg 90 capsule 0    levothyroxine (SYNTHROID) 112 MCG tablet Take 1 tablet by mouth every morning (before breakfast) 90 tablet 1    simvastatin (ZOCOR) 40 MG tablet Take 1 tablet by mouth every evening 90 tablet 1    losartan-hydroCHLOROthiazide (HYZAAR) 100-25 MG per tablet Take 1 tab by mouth daily for blood pressure. 90 tablet 1    traMADol (ULTRAM) 50 MG tablet Take 1 tablet by mouth 2 times daily as needed for Pain for up to 60 days.  Max Daily Amount: 100 mg

## 2023-10-20 ENCOUNTER — HOME CARE VISIT (OUTPATIENT)
Dept: HOME HEALTH SERVICES | Facility: HOME HEALTH | Age: 78
End: 2023-10-20
Payer: MEDICARE

## 2023-10-23 ENCOUNTER — HOME CARE VISIT (OUTPATIENT)
Dept: HOME HEALTH SERVICES | Facility: HOME HEALTH | Age: 78
End: 2023-10-23
Payer: MEDICARE

## 2023-10-23 ENCOUNTER — HOME CARE VISIT (OUTPATIENT)
Facility: HOME HEALTH | Age: 78
End: 2023-10-23
Payer: MEDICARE

## 2023-10-23 PROCEDURE — G0152 HHCP-SERV OF OT,EA 15 MIN: HCPCS

## 2023-10-24 ENCOUNTER — HOME CARE VISIT (OUTPATIENT)
Facility: HOME HEALTH | Age: 78
End: 2023-10-24
Payer: MEDICARE

## 2023-10-24 VITALS
RESPIRATION RATE: 18 BRPM | HEART RATE: 80 BPM | DIASTOLIC BLOOD PRESSURE: 90 MMHG | SYSTOLIC BLOOD PRESSURE: 150 MMHG | OXYGEN SATURATION: 98 % | TEMPERATURE: 97.3 F

## 2023-10-24 VITALS
OXYGEN SATURATION: 99 % | SYSTOLIC BLOOD PRESSURE: 126 MMHG | TEMPERATURE: 97.3 F | DIASTOLIC BLOOD PRESSURE: 74 MMHG | HEART RATE: 71 BPM | RESPIRATION RATE: 18 BRPM

## 2023-10-24 PROCEDURE — G0156 HHCP-SVS OF AIDE,EA 15 MIN: HCPCS

## 2023-10-24 PROCEDURE — G0151 HHCP-SERV OF PT,EA 15 MIN: HCPCS

## 2023-10-24 ASSESSMENT — ENCOUNTER SYMPTOMS
DYSPNEA ACTIVITY LEVEL: AFTER AMBULATING MORE THAN 20 FT
PAIN LOCATION - PAIN QUALITY: ACHING

## 2023-10-24 NOTE — HOME HEALTH
Subjective: Patient states she has been feeling much better since she took the prednisone. Falls since last visit No(if yes complete the Fall Tracking Form and include bsrifallreport):   Caregiver involvement changes: None  Home health supplies by type and quantity ordered/delivered this visit include: None    Clinician asked if patient has had any physician contact since last home care visit and patient states: YES  Clinician asked if patient has any new or changed medications and patient states:  YES prednisone added to med profile 10/19, updated today  If Yes, were medications reconciled? YES   Was the certifying physician notified of changes in medications? N/A NP prescribed    Clinical assessment (what this visit means for the patient overall and need for ongoing skilled care) and progress or lack of progress towards SPECIFIC goals: Patient demonstrates progression towards goals. She has improved TUG score of 45 seconds, and increased balance with Tinetti of 15/28. She is ambulating with use of rollator but still requires education for improved gait technique to reduce fall risk. She will continue to benefit from skilled PT to progress with all activities and decrease fall risk. Written Teaching Material Utilized: HEP    Interdisciplinary communication with: Lenore Spaulding and Zenaida WHITE for the purpose of POC collaboration    Discharge planning as follows:  Will discharge when the patient has reached their maximum functional potential and maximum safety in their home    Specific plan for next visit: continue with exercise and gait

## 2023-10-24 NOTE — HOME HEALTH
Subjective: I feel great. the pretnose has really helped my energy. Falls since last visit No(if yes complete the Fall Tracking Form and include bsrifallreport):   Caregiver involvement changes: no changes  Home health supplies by type and quantity ordered/delivered this visit include: NA    Clinician asked if patient has had any physician contact since last home care visit and patient states: NO  Clinician asked if patient has any new or changed medications and patient states:  NO   If Yes, were medications reconciled? NO   Was the certifying physician notified of changes in medications? N/A     Clinical assessment (what this visit means for the patient overall and need for ongoing skilled care) and progress or lack of progress towards SPECIFIC goals: see d/c summary    Written Teaching Material Utilized: d/c summary    Interdisciplinary communication with: SIMRAN Kingston for the purpose of POC collaboration    Discharge planning as follows: d/c this date.      Specific plan for next visit: Progress patient to d/c summary

## 2023-10-25 ENCOUNTER — PATIENT MESSAGE (OUTPATIENT)
Facility: CLINIC | Age: 78
End: 2023-10-25

## 2023-10-25 DIAGNOSIS — J44.9 CHRONIC OBSTRUCTIVE PULMONARY DISEASE, UNSPECIFIED COPD TYPE (HCC): Primary | ICD-10-CM

## 2023-10-26 ENCOUNTER — HOME CARE VISIT (OUTPATIENT)
Facility: HOME HEALTH | Age: 78
End: 2023-10-26
Payer: MEDICARE

## 2023-10-26 VITALS
OXYGEN SATURATION: 100 % | TEMPERATURE: 97.7 F | SYSTOLIC BLOOD PRESSURE: 110 MMHG | RESPIRATION RATE: 16 BRPM | DIASTOLIC BLOOD PRESSURE: 74 MMHG | HEART RATE: 74 BPM

## 2023-10-26 PROCEDURE — G0151 HHCP-SERV OF PT,EA 15 MIN: HCPCS

## 2023-10-26 PROCEDURE — G0156 HHCP-SVS OF AIDE,EA 15 MIN: HCPCS

## 2023-10-26 NOTE — HOME HEALTH
Subjective: Patient states she feels so good on prednisone. Wishes she could take it forever. Falls since last visit No(if yes complete the Fall Tracking Form and include bsrifallreport):   Caregiver involvement changes: None  Home health supplies by type and quantity ordered/delivered this visit include: None    Clinician asked if patient has had any physician contact since last home care visit and patient states: NO  Clinician asked if patient has any new or changed medications and patient states:  NO   If Yes, were medications reconciled? N/A   Was the certifying physician notified of changes in medications? N/A     Clinical assessment (what this visit means for the patient overall and need for ongoing skilled care) and progress or lack of progress towards SPECIFIC goals: Patient demonstrates safe bed mobility and transfers. Goals met. Patient requires continued education about strengthening exercises. Goal not met. Written Teaching Material Utilized: HEP    Interdisciplinary communication with: N/A     Discharge planning as follows:  Will discharge when the patient has reached their maximum functional potential and maximum safety in their home    Specific plan for next visit: gait and balance

## 2023-10-27 ENCOUNTER — TELEPHONE (OUTPATIENT)
Facility: CLINIC | Age: 78
End: 2023-10-27

## 2023-10-27 RX ORDER — IPRATROPIUM BROMIDE AND ALBUTEROL SULFATE 2.5; .5 MG/3ML; MG/3ML
1 SOLUTION RESPIRATORY (INHALATION) EVERY 4 HOURS
Qty: 360 ML | Refills: 3 | Status: SHIPPED | OUTPATIENT
Start: 2023-10-27 | End: 2024-01-15

## 2023-10-27 NOTE — TELEPHONE ENCOUNTER
YAHAIRA Griffiths NP 10/26/2023 8:15 AM EDT    Can you please address this, Sheridan Ace? Not sure why this was forwarded directly to me?    ----- Message -----  From: Yu Hill RN  Sent: 10/26/2023 8:02 AM EDT  To: YAHAIRA Griffiths NP  Subject: FW: Nebulizer       ----- Message -----  From: Alexis Stockton  Sent: 10/25/2023 6:45 PM EDT  To: *  Subject: Nebulizer     Hi Tiffanie. I received the nebulizer. How do I handle getting the medicine for it. She is still wheezing. Thanks.  Wesley Estrada

## 2023-10-31 ENCOUNTER — HOME CARE VISIT (OUTPATIENT)
Facility: HOME HEALTH | Age: 78
End: 2023-10-31
Payer: MEDICARE

## 2023-10-31 DIAGNOSIS — M25.511 CHRONIC RIGHT SHOULDER PAIN: ICD-10-CM

## 2023-10-31 DIAGNOSIS — G89.29 CHRONIC RIGHT SHOULDER PAIN: ICD-10-CM

## 2023-10-31 PROCEDURE — G0151 HHCP-SERV OF PT,EA 15 MIN: HCPCS

## 2023-11-01 VITALS
RESPIRATION RATE: 18 BRPM | SYSTOLIC BLOOD PRESSURE: 132 MMHG | HEART RATE: 66 BPM | TEMPERATURE: 98.2 F | DIASTOLIC BLOOD PRESSURE: 84 MMHG | OXYGEN SATURATION: 99 %

## 2023-11-01 ASSESSMENT — ENCOUNTER SYMPTOMS: PAIN LOCATION - PAIN QUALITY: ACHING

## 2023-11-01 NOTE — HOME HEALTH
Subjective: Patient states she misses her prednisone. She felt so good on it and is not feeling great now. Falls since last visit No(if yes complete the Fall Tracking Form and include bsrifallreport):   Caregiver involvement changes: None  Home health supplies by type and quantity ordered/delivered this visit include: None    Clinician asked if patient has had any physician contact since last home care visit and patient states: No  Clinician asked if patient has any new or changed medications and patient states:  YES prednisone stopped  If Yes, were medications reconciled? YES   Was the certifying physician notified of changes in medications? N/A     Clinical assessment (what this visit means for the patient overall and need for ongoing skilled care) and progress or lack of progress towards SPECIFIC goals: Patient demonstrates understanding of strengthening exercises. Will assess progression at next session. Patient demonstrates difficulty with balance exercises d/t fear of falling. Will assess at next session as well    Written Teaching Material Utilized: N/A    Interdisciplinary communication with: N/A     Discharge planning as follows:  When goals are met    Specific plan for next visit: discharge

## 2023-11-02 ENCOUNTER — HOME CARE VISIT (OUTPATIENT)
Facility: HOME HEALTH | Age: 78
End: 2023-11-02
Payer: MEDICARE

## 2023-11-02 VITALS
RESPIRATION RATE: 18 BRPM | SYSTOLIC BLOOD PRESSURE: 130 MMHG | DIASTOLIC BLOOD PRESSURE: 80 MMHG | HEART RATE: 73 BPM | OXYGEN SATURATION: 99 % | TEMPERATURE: 97.7 F

## 2023-11-02 PROCEDURE — G0151 HHCP-SERV OF PT,EA 15 MIN: HCPCS

## 2023-11-02 ASSESSMENT — ENCOUNTER SYMPTOMS
DYSPNEA ACTIVITY LEVEL: AFTER AMBULATING 10 - 20 FT
PAIN LOCATION - PAIN QUALITY: ACHING

## 2023-11-02 NOTE — HOME HEALTH
Subjective: Patient states she is very upset with how much pain she is in right now. She misses her prednisone. PT notified NP  Falls since last visit No(if yes complete the Fall Tracking Form and include bsrifallreport):   Caregiver involvement changes: None    Clinician asked if patient has had any physician contact since last home care visit and patient states: NO  Clinician asked if patient has any new or changed medications and patient states:  NO   If Yes, were medications reconciled? YES reconciled for home health discharge  Was the certifying physician notified of changes in medications? N/A     A list of reconciled medications has been given to the patient/caregiver  and uploaded to media. Clinical assessment (what this visit means for the patient overall and need for ongoing skilled care) and progress or lack of progress towards SPECIFIC goals: Patient received 7 weeks of skilled PT, OT, HHA, and SN intervention to address impairments r/t MS. She was educated on home safety, ambulation with use of rollator, HEP, and balance. She improved her overall balance with TUG score of 45 seconds with use of rollator, Tinetti was 18/28 (not tested at [facility]), and improved strength with MMT of 3+/5 for BLE movements. She is able to bathe with intermittent assistance and perform all transfers with independence. She has reached her max potential with home health and is appropriate for discharge at this time. Discharge Instructions:    Written Teaching Material Utilized: discharge instructions    Instructed patient/caregiver on the following: Take all medications exactly as prescribed by physician.  Updated medication list present in the home at discharge, Keep all scheduled medical appointments, Follow safety and fall prevention education to prevent falls and Continue home exercises as instructed by your therapist    Call physician for: continuous pain, increased pain, new problem and frequent falls    The

## 2023-11-03 ENCOUNTER — TELEPHONE (OUTPATIENT)
Facility: CLINIC | Age: 78
End: 2023-11-03

## 2023-11-03 NOTE — TELEPHONE ENCOUNTER
Call attempted to patient's home phone number - no answer. Call placed to patient's daughter, Derrick Coronado - Daughter confirmed that these last 2 days were difficult for patient. Specially yesterday, 11/2/23. Patient was having severe pain, and really upset that she was not taking prednisone anymore. Derrick Coronado states that while patient was taking the medication, she carried out her \"normal activity\" and did not stay in recliner all day, as she usually does. I explained that the dose of tramadol that she was prescribed would probably not be realistic for a long-term dose, but I would discuss her concerns with Sruthi Graves, STEPHEN and call back with recommendations. Spoke with NP - Will offer visit for Monday, 11/6, in the afternoon.

## 2023-11-03 NOTE — TELEPHONE ENCOUNTER
The patient called and stated that she was returning a call from this number.  I checked the patient's chart and let her know that Landen was attempting to reach her to schedule an appointment on Monday, 11/6/23, arrival between 12-4pm.  The patient accepted the appointment offer and expressed thanks.     Called patient to confirm their in home visit with Tiffanie Roberts NP on Monday, 11/6/23, arrival between 12-4pm.  Spoke with the patient, they confirmed the appointment and answered the covid screen questions. Does anyone in the household have covid NO  Have there been any changes to insurance NO or location NO.

## 2023-11-06 ENCOUNTER — OFFICE VISIT (OUTPATIENT)
Facility: CLINIC | Age: 78
End: 2023-11-06
Payer: MEDICARE

## 2023-11-06 VITALS
HEART RATE: 64 BPM | TEMPERATURE: 97.9 F | OXYGEN SATURATION: 95 % | DIASTOLIC BLOOD PRESSURE: 60 MMHG | SYSTOLIC BLOOD PRESSURE: 118 MMHG | RESPIRATION RATE: 20 BRPM

## 2023-11-06 DIAGNOSIS — J44.9 CHRONIC OBSTRUCTIVE PULMONARY DISEASE, UNSPECIFIED COPD TYPE (HCC): ICD-10-CM

## 2023-11-06 DIAGNOSIS — G35 MULTIPLE SCLEROSIS (HCC): Primary | ICD-10-CM

## 2023-11-06 DIAGNOSIS — M25.50 MULTIPLE JOINT PAIN: ICD-10-CM

## 2023-11-06 DIAGNOSIS — G89.29 CHRONIC RIGHT SHOULDER PAIN: ICD-10-CM

## 2023-11-06 DIAGNOSIS — M25.511 CHRONIC RIGHT SHOULDER PAIN: ICD-10-CM

## 2023-11-06 PROCEDURE — G8417 CALC BMI ABV UP PARAM F/U: HCPCS | Performed by: NURSE PRACTITIONER

## 2023-11-06 PROCEDURE — 1036F TOBACCO NON-USER: CPT | Performed by: NURSE PRACTITIONER

## 2023-11-06 PROCEDURE — 3078F DIAST BP <80 MM HG: CPT | Performed by: NURSE PRACTITIONER

## 2023-11-06 PROCEDURE — G8484 FLU IMMUNIZE NO ADMIN: HCPCS | Performed by: NURSE PRACTITIONER

## 2023-11-06 PROCEDURE — 1123F ACP DISCUSS/DSCN MKR DOCD: CPT | Performed by: NURSE PRACTITIONER

## 2023-11-06 PROCEDURE — 3074F SYST BP LT 130 MM HG: CPT | Performed by: NURSE PRACTITIONER

## 2023-11-06 PROCEDURE — 1090F PRES/ABSN URINE INCON ASSESS: CPT | Performed by: NURSE PRACTITIONER

## 2023-11-06 PROCEDURE — 99349 HOME/RES VST EST MOD MDM 40: CPT | Performed by: NURSE PRACTITIONER

## 2023-11-06 RX ORDER — METHYLPREDNISOLONE ACETATE 80 MG/ML
80 INJECTION, SUSPENSION INTRA-ARTICULAR; INTRALESIONAL; INTRAMUSCULAR; SOFT TISSUE ONCE
Qty: 1 ML | Refills: 0 | Status: SHIPPED | OUTPATIENT
Start: 2023-11-06 | End: 2023-11-06

## 2023-11-06 RX ORDER — AZITHROMYCIN 250 MG/1
250 TABLET, FILM COATED ORAL SEE ADMIN INSTRUCTIONS
Qty: 6 TABLET | Refills: 0 | Status: SHIPPED | OUTPATIENT
Start: 2023-11-06 | End: 2023-11-11

## 2023-11-06 RX ORDER — BUDESONIDE 0.25 MG/2ML
250 INHALANT ORAL 2 TIMES DAILY
Qty: 180 EACH | Refills: 1 | Status: SHIPPED | OUTPATIENT
Start: 2023-11-06

## 2023-11-06 RX ORDER — LIDOCAINE HYDROCHLORIDE 10 MG/ML
1 INJECTION, SOLUTION EPIDURAL; INFILTRATION; INTRACAUDAL; PERINEURAL ONCE
Qty: 1 ML | Refills: 0 | Status: SHIPPED | OUTPATIENT
Start: 2023-11-06 | End: 2023-11-06

## 2023-11-06 RX ORDER — PREDNISONE 5 MG/1
5 TABLET ORAL DAILY
Qty: 30 TABLET | Refills: 0 | Status: SHIPPED | OUTPATIENT
Start: 2023-11-06 | End: 2023-12-06

## 2023-11-06 NOTE — PROGRESS NOTES
[address] [place] [address]    [phone]      NOTE: [address] [place] [address] is a PROVIDER (MD/NP) based interdisciplinary practice (RN, QIW) for patients who cannot access (or have a taxing effort) primary / speciality medical care in an office setting. [facility] is NOT [facility] but works with [address] [place] [address]. when there is a skilled need. Our MD/NPs are integral in Care Transitions; PLEASE CALL [phone] if this patient arrives in the Emergency Department or Judi Pate is an established patient of the [address] [place] [address] Barry eLe at [facility] seen today for an acute visit with chief complaint of diffuse pain, cough. ASSESSMENT/PLAN  Assessment & Plan   1. Multiple sclerosis (HCC)  -     predniSONE (DELTASONE) 5 MG tablet; Take 1 tablet by mouth daily, Disp-30 tablet, R-0Normal  2. Chronic right shoulder pain  -     lidocaine PF 1 % SOLN injection; Inject 1 mL into the articular space once for 1 dose, Disp-1 mL, R-0Normal  -     methylPREDNISolone acetate (DEPO-MEDROL) 80 MG/ML injection; Inject 1 mL into the articular space once for 1 dose, Disp-1 mL, R-0Normal  3. Multiple joint pain  -     predniSONE (DELTASONE) 5 MG tablet; Take 1 tablet by mouth daily, Disp-30 tablet, R-0Normal  4. Chronic obstructive pulmonary disease, unspecified COPD type (HCC)  -     budesonide (PULMICORT) 0.25 MG/2ML nebulizer suspension; Take 2 mLs by nebulization 2 times daily, Disp-180 each, R-1Normal  -     azithromycin (ZITHROMAX) 250 MG tablet; Take 1 tablet by mouth See Admin Instructions for 5 days 500mg on day 1 followed by 250mg on days 2 - 5, Disp-6 tablet, R-0Normal    Chronic Pain 2/2 Multiple Sclerosis - Discussed pros/cons of chronic prednisone usage, especially as it relates to bone density and risk for hyperglycemia. She states \"I understand, but I really want to focus on my quality of life right now.  I'd rather have one year with less pain

## 2023-11-08 ENCOUNTER — OFFICE VISIT (OUTPATIENT)
Facility: CLINIC | Age: 78
End: 2023-11-08

## 2023-11-08 VITALS
OXYGEN SATURATION: 97 % | DIASTOLIC BLOOD PRESSURE: 70 MMHG | HEART RATE: 67 BPM | RESPIRATION RATE: 20 BRPM | SYSTOLIC BLOOD PRESSURE: 110 MMHG

## 2023-11-08 DIAGNOSIS — M19.011 PRIMARY OSTEOARTHRITIS OF RIGHT SHOULDER: Primary | ICD-10-CM

## 2023-11-08 NOTE — PROGRESS NOTES
Patient with PMHx significant for MS, OA, hypothyroidism, hypertension seen today for right shoulder injection. Patient has had several joint injections in the past, all > 1 year ago, with relief of chronic pain. X-ray done October 2023 showed mild degenerative changes of R shoulder joint with some thinnning consistent with possible rotator cuff tear.      /70 (Site: Right Upper Arm, Position: Sitting, Cuff Size: Large Adult)   Pulse 67   Resp 20   SpO2 97%   Current Outpatient Medications on File Prior to Visit   Medication Sig Dispense Refill    budesonide (PULMICORT) 0.25 MG/2ML nebulizer suspension Take 2 mLs by nebulization 2 times daily 180 each 1    predniSONE (DELTASONE) 5 MG tablet Take 1 tablet by mouth daily 30 tablet 0    azithromycin (ZITHROMAX) 250 MG tablet Take 1 tablet by mouth See Admin Instructions for 5 days 500mg on day 1 followed by 250mg on days 2 - 5 6 tablet 0    ipratropium 0.5 mg-albuterol 2.5 mg (DUONEB) 0.5-2.5 (3) MG/3ML SOLN nebulizer solution Inhale 3 mLs into the lungs every 4 hours 360 mL 3    QUEtiapine (SEROQUEL) 25 MG tablet Take 1 tablet by mouth nightly 30 tablet 1    mirabegron (MYRBETRIQ) 50 MG TB24 Take 50 mg by mouth daily      phenazopyridine (PYRIDIUM) 100 MG tablet Take 1 tablet by mouth 2 times daily as needed for Pain      Cranberry 300 MG TABS Take 1 tablet by mouth daily      acetaminophen (TYLENOL) 500 MG tablet Take 2 tablets by mouth 3 times daily as needed for Pain      Netarsudil Dimesylate (RHOPRESSA) 0.02 % SOLN Place 1 drop into both eyes daily      calcium carbonate 600 MG TABS tablet Take 1 tablet by mouth daily      Multiple Vitamins-Minerals (OCUVITE ADULT 50+ PO) Take 1 tablet by mouth daily      Omega-3 Fatty Acids (FISH OIL) 1000 MG capsule Take 1 capsule by mouth daily      Lactobacillus (DIGESTIVE HEALTH PROBIOTIC) CAPS Take 1 capsule by mouth daily 100 million daily      buPROPion (WELLBUTRIN XL) 150 MG extended release tablet Take 1

## 2023-11-14 ENCOUNTER — TELEPHONE (OUTPATIENT)
Facility: CLINIC | Age: 78
End: 2023-11-14

## 2023-11-14 DIAGNOSIS — B37.2 CANDIDIASIS OF SKIN: ICD-10-CM

## 2023-11-14 NOTE — TELEPHONE ENCOUNTER
Call placed to patient's daughter in response to Patient Advice Request - I informed Yue Ralph that I had spoken with Drew Freeman, NP and provider advised that due to the fact that pt has a history of complex UTI's, with resistant bacteria, it would be very important to have a urine culture prior to initiating antibiotic treatment. NP recommends calling [facility] for a visit/assessment, I provided their phone number. Yue Ralph stated that she will call [facility].

## 2023-11-14 NOTE — TELEPHONE ENCOUNTER
----- Message from Jm Taylor sent at 11/14/2023  1:27 PM EST -----  Regarding: Nebulizer  Contact: [phone]  Hi. I spoke with the nurse at [address] [place] [address] Urology. She said the doctor said that she was resistent to the antibiotic and to come back in. He was speaking about what he had already told us and already written a new rx for. I told them I disagreed.  Felt said she could send somebody to test urine and write script. Can you all help? I will have to take her to er otherwise. All she needs is the antibiotic.  Thanks

## 2023-11-21 ENCOUNTER — TELEPHONE (OUTPATIENT)
Facility: CLINIC | Age: 78
End: 2023-11-21

## 2023-11-21 NOTE — TELEPHONE ENCOUNTER
Called patient to confirm their in home visit with Patti Benito on 11/28/23, arrival between 12-4. Spoke with Zaida Higgins, they confirmed the appointment and answered the covid screen questions.  Does anyone in the household have covid No  Have there been any changes to insurance No or location No

## 2023-11-28 ENCOUNTER — OFFICE VISIT (OUTPATIENT)
Facility: CLINIC | Age: 78
End: 2023-11-28
Payer: MEDICARE

## 2023-11-28 VITALS
OXYGEN SATURATION: 96 % | HEART RATE: 65 BPM | DIASTOLIC BLOOD PRESSURE: 80 MMHG | TEMPERATURE: 98.4 F | RESPIRATION RATE: 20 BRPM | SYSTOLIC BLOOD PRESSURE: 132 MMHG

## 2023-11-28 DIAGNOSIS — Z51.81 ENCOUNTER FOR MEDICATION MONITORING: ICD-10-CM

## 2023-11-28 DIAGNOSIS — G25.81 RLS (RESTLESS LEGS SYNDROME): ICD-10-CM

## 2023-11-28 DIAGNOSIS — M25.50 MULTIPLE JOINT PAIN: ICD-10-CM

## 2023-11-28 DIAGNOSIS — E03.9 HYPOTHYROIDISM, UNSPECIFIED TYPE: ICD-10-CM

## 2023-11-28 DIAGNOSIS — N39.3 URINE, INCONTINENCE, STRESS FEMALE: ICD-10-CM

## 2023-11-28 DIAGNOSIS — J44.9 CHRONIC OBSTRUCTIVE PULMONARY DISEASE, UNSPECIFIED COPD TYPE (HCC): Primary | ICD-10-CM

## 2023-11-28 DIAGNOSIS — G35 MULTIPLE SCLEROSIS (HCC): ICD-10-CM

## 2023-11-28 DIAGNOSIS — M81.0 AGE-RELATED OSTEOPOROSIS WITHOUT CURRENT PATHOLOGICAL FRACTURE: ICD-10-CM

## 2023-11-28 DIAGNOSIS — I10 PRIMARY HYPERTENSION: ICD-10-CM

## 2023-11-28 DIAGNOSIS — G47.00 INSOMNIA, UNSPECIFIED TYPE: ICD-10-CM

## 2023-11-28 PROCEDURE — 0509F URINE INCON PLAN DOCD: CPT | Performed by: NURSE PRACTITIONER

## 2023-11-28 PROCEDURE — 99350 HOME/RES VST EST HIGH MDM 60: CPT | Performed by: NURSE PRACTITIONER

## 2023-11-28 PROCEDURE — 1036F TOBACCO NON-USER: CPT | Performed by: NURSE PRACTITIONER

## 2023-11-28 PROCEDURE — G8417 CALC BMI ABV UP PARAM F/U: HCPCS | Performed by: NURSE PRACTITIONER

## 2023-11-28 PROCEDURE — 1123F ACP DISCUSS/DSCN MKR DOCD: CPT | Performed by: NURSE PRACTITIONER

## 2023-11-28 PROCEDURE — 1090F PRES/ABSN URINE INCON ASSESS: CPT | Performed by: NURSE PRACTITIONER

## 2023-11-28 PROCEDURE — 3075F SYST BP GE 130 - 139MM HG: CPT | Performed by: NURSE PRACTITIONER

## 2023-11-28 PROCEDURE — 3079F DIAST BP 80-89 MM HG: CPT | Performed by: NURSE PRACTITIONER

## 2023-11-28 PROCEDURE — G8484 FLU IMMUNIZE NO ADMIN: HCPCS | Performed by: NURSE PRACTITIONER

## 2023-11-28 RX ORDER — QUETIAPINE FUMARATE 25 MG/1
25 TABLET, FILM COATED ORAL
Qty: 90 TABLET | Refills: 1 | Status: SHIPPED | OUTPATIENT
Start: 2023-11-28

## 2023-11-28 RX ORDER — PREDNISONE 1 MG/1
4 TABLET ORAL DAILY
Qty: 360 TABLET | Refills: 0 | Status: SHIPPED | OUTPATIENT
Start: 2023-11-28

## 2023-11-28 NOTE — PROGRESS NOTES
with the patient discussing the diagnosis and importance of compliance with the treatment plan as well as documenting on the day of the visit. An electronic signature was used to authenticate this note.   -- Collette Ode, APRN - NP

## 2023-12-01 LAB — DRUGS UR: NORMAL

## 2024-01-09 ENCOUNTER — CLINICAL DOCUMENTATION (OUTPATIENT)
Facility: CLINIC | Age: 79
End: 2024-01-09

## 2024-01-09 NOTE — PROGRESS NOTES
100-25 MG per tablet Take 1 tab by mouth daily for blood pressure.    nystatin (MYCOSTATIN) 967864 UNIT/GM powder Apply 3 times daily. (Patient taking differently: Apply 3 times daily  to skin fold areas such as between legs, under stomach folds)    aspirin 81 MG EC tablet Take 1 tablet by mouth daily    brinzolamide (AZOPT) 1 % ophthalmic suspension Place 1 drop into both eyes 2 times daily    cyanocobalamin 1000 MCG tablet Take 1 tablet by mouth daily    cycloSPORINE (RESTASIS) 0.05 % ophthalmic emulsion Place 1 drop into both eyes 2 times daily    latanoprost (XALATAN) 0.005 % ophthalmic solution Place 1 drop into both eyes daily    timolol (TIMOPTIC) 0.5 % ophthalmic solution INSTILL 1 DROP INTO INTO BOTH EYES TWICE A DAY     No current facility-administered medications for this visit.       PLAN OF CARE    The Plan of Care is initiated within 15 days of the initial provider visit and updated at least every 60 days or sooner, based on patient's changing condition.  Plan of Care Orders / Action Items:  Refer to most recent provider visit note for specifics re: plan of care.  No recent changes to plan of care          Estimated Visit Frequency:  Every 2 months  SW visits PRN

## 2024-01-11 RX ORDER — ZOLEDRONIC ACID 5 MG/100ML
5 INJECTION, SOLUTION INTRAVENOUS ONCE
OUTPATIENT
Start: 2024-01-11 | End: 2024-01-11

## 2024-01-13 ENCOUNTER — HOSPITAL ENCOUNTER (EMERGENCY)
Facility: HOSPITAL | Age: 79
Discharge: HOME | End: 2024-01-13
Attending: STUDENT IN AN ORGANIZED HEALTH CARE EDUCATION/TRAINING PROGRAM
Payer: MEDICARE

## 2024-01-13 ENCOUNTER — APPOINTMENT (OUTPATIENT)
Facility: HOSPITAL | Age: 79
End: 2024-01-13
Payer: MEDICARE

## 2024-01-13 VITALS
BODY MASS INDEX: 38.98 KG/M2 | DIASTOLIC BLOOD PRESSURE: 50 MMHG | RESPIRATION RATE: 20 BRPM | HEART RATE: 64 BPM | SYSTOLIC BLOOD PRESSURE: 105 MMHG | OXYGEN SATURATION: 93 % | WEIGHT: 220 LBS | TEMPERATURE: 98 F | HEIGHT: 63 IN
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
## 2024-01-13 DIAGNOSIS — S09.90XA CLOSED HEAD INJURY, INITIAL ENCOUNTER: ICD-10-CM

## 2024-01-13 PROCEDURE — 99284 EMERGENCY DEPT VISIT MOD MDM: CPT

## 2024-01-13 PROCEDURE — 70450 CT HEAD/BRAIN W/O DYE: CPT

## 2024-01-13 ASSESSMENT — PAIN - FUNCTIONAL ASSESSMENT: PAIN_FUNCTIONAL_ASSESSMENT: NONE - DENIES PAIN

## 2024-01-14 NOTE — ED PROVIDER NOTES
radiographic images are visualized and preliminarily interpreted by the emergency physician with the below findings:        Interpretation per the Radiologist below, if available at the time of this note:    CT HEAD WO CONTRAST   Final Result      No acute intracranial abnormality.               LABS:  Labs Reviewed - No data to display    All other labs were within normal range or not returned as of this dictation.    EMERGENCY DEPARTMENT COURSE and DIFFERENTIAL DIAGNOSIS/MDM:   Vitals:    Vitals:    01/13/24 2035 01/13/24 2040 01/13/24 2045 01/13/24 2130   BP:   (!) 119/50 (!) 105/50   Pulse:  73 65 64   Resp: 18 20 18 20   Temp: 98 °F (36.7 °C)      SpO2: 95% 93% 93% 93%   Weight: 99.8 kg (220 lb)      Height: 1.6 m (5' 3\")              Medical Decision Making  In the ER, patient is well-appearing, in no acute distress.  Alert and oriented x 3, without focal neurologic deficits.  No signs of external head or face trauma.  No midline C-spine tenderness.  She is not anticoagulated.  Will check CT head to rule out for traumatic ICH, though clinically suspicion for this is low.      Amount and/or Complexity of Data Reviewed  Radiology: ordered.            REASSESSMENT      CT head is negative for acute process.  Results discussed with the patient and daughter.  She felt comfortable plan for discharge.  Return precautions discussed.      CONSULTS:  None    PROCEDURES:  Unless otherwise noted below, none     Procedures      FINAL IMPRESSION      1. Fall, initial encounter    2. Closed head injury, initial encounter          DISPOSITION/PLAN   DISPOSITION Decision To Discharge 01/13/2024 10:43:38 PM      PATIENT REFERRED TO:  Tiffanie Roberts, YAHAIRA - NP  [address] [address] [phone]    Schedule an appointment as soon as possible for a visit         DISCHARGE MEDICATIONS:  Discharge Medication List as of 1/13/2024 10:44 PM            (Please note that portions of this note were completed

## 2024-01-14 NOTE — ED TRIAGE NOTES
Patient arrived to ED via EMS from [facility] following a fall while in the shower today, she states that she hit the back of her head, no visible or palpable injuries. Denies any pain or LOC, states that she is on palliative care at this time.

## 2024-01-15 ENCOUNTER — HOSPITAL ENCOUNTER (OUTPATIENT)
Facility: HOSPITAL | Age: 79
Setting detail: INFUSION SERIES
Discharge: HOME | End: 2024-01-15

## 2024-01-15 VITALS
WEIGHT: 224 LBS | HEIGHT: 63 IN | HEART RATE: 68 BPM | BODY MASS INDEX: 39.69 KG/M2 | DIASTOLIC BLOOD PRESSURE: 55 MMHG | SYSTOLIC BLOOD PRESSURE: 135 MMHG

## 2024-01-15 DIAGNOSIS — M81.0 AGE-RELATED OSTEOPOROSIS WITHOUT CURRENT PATHOLOGICAL FRACTURE: Primary | ICD-10-CM

## 2024-01-15 ASSESSMENT — PAIN DESCRIPTION - LOCATION: LOCATION: KNEE

## 2024-01-15 ASSESSMENT — PAIN SCALES - GENERAL: PAINLEVEL_OUTOF10: 5

## 2024-01-15 ASSESSMENT — PAIN DESCRIPTION - ORIENTATION: ORIENTATION: LEFT

## 2024-01-15 NOTE — PROGRESS NOTES
[facility] Short Note                       Date: January 15, 2024    Name: Jessi Taylor    MRN: [id number]         : 1945      Pt admit to [facility] for Reclast ambulatory in stable condition. Pt states that she is scheduled to have a tooth extraction 24. Notified pt that her infusion will need to be rescheduled 6 weeks after her tooth extraction. Also encouraged pt to notify her dentist that she is taking Reclast. She and her daughter verbalized understanding.    Ms. Taylor's vitals were reviewed prior to and after treatment.   Patient Vitals for the past 12 hrs:   Pulse BP   01/15/24 0939 68 (!) 135/55       [facility] and is aware of future appointments.     Future Appointments   Date Time Provider Department Center   2024  9:30 AM Tiffanie Roberts APRN - NP [facility] AMB       MARK DEWITT RN  January 15, 2024  10:59 AM

## 2024-01-16 ENCOUNTER — TELEPHONE (OUTPATIENT)
Facility: CLINIC | Age: 79
End: 2024-01-16

## 2024-01-16 NOTE — TELEPHONE ENCOUNTER
Called patient to confirm their in home visit with Tiffanie Roberts on 1/18/24, arrival between 9-1.  Spoke with Javon Street, they confirmed the appointment and answered the covid screen questions. Does anyone in the household have covid No  Have there been any changes to insurance No or location No

## 2024-01-18 ENCOUNTER — OFFICE VISIT (OUTPATIENT)
Facility: CLINIC | Age: 79
End: 2024-01-18
Payer: MEDICARE

## 2024-01-18 VITALS
OXYGEN SATURATION: 97 % | SYSTOLIC BLOOD PRESSURE: 124 MMHG | HEART RATE: 63 BPM | RESPIRATION RATE: 20 BRPM | TEMPERATURE: 97.9 F | DIASTOLIC BLOOD PRESSURE: 62 MMHG

## 2024-01-18 DIAGNOSIS — G35 MULTIPLE SCLEROSIS (HCC): Primary | ICD-10-CM

## 2024-01-18 DIAGNOSIS — M54.41 CHRONIC BILATERAL LOW BACK PAIN WITH RIGHT-SIDED SCIATICA: ICD-10-CM

## 2024-01-18 DIAGNOSIS — M81.0 AGE-RELATED OSTEOPOROSIS WITHOUT CURRENT PATHOLOGICAL FRACTURE: ICD-10-CM

## 2024-01-18 DIAGNOSIS — J44.9 CHRONIC OBSTRUCTIVE PULMONARY DISEASE, UNSPECIFIED COPD TYPE (HCC): ICD-10-CM

## 2024-01-18 DIAGNOSIS — N39.0 RECURRENT URINARY TRACT INFECTION: ICD-10-CM

## 2024-01-18 DIAGNOSIS — G89.29 CHRONIC BILATERAL LOW BACK PAIN WITH RIGHT-SIDED SCIATICA: ICD-10-CM

## 2024-01-18 DIAGNOSIS — F33.0 MILD EPISODE OF RECURRENT MAJOR DEPRESSIVE DISORDER (HCC): ICD-10-CM

## 2024-01-18 DIAGNOSIS — I10 PRIMARY HYPERTENSION: ICD-10-CM

## 2024-01-18 DIAGNOSIS — N39.3 URINE, INCONTINENCE, STRESS FEMALE: ICD-10-CM

## 2024-01-18 DIAGNOSIS — Z00.00 ENCOUNTER FOR ANNUAL WELLNESS VISIT (AWV) IN MEDICARE PATIENT: ICD-10-CM

## 2024-01-18 PROCEDURE — G0439 PPPS, SUBSEQ VISIT: HCPCS | Performed by: NURSE PRACTITIONER

## 2024-01-18 PROCEDURE — G8484 FLU IMMUNIZE NO ADMIN: HCPCS | Performed by: NURSE PRACTITIONER

## 2024-01-18 PROCEDURE — 1123F ACP DISCUSS/DSCN MKR DOCD: CPT | Performed by: NURSE PRACTITIONER

## 2024-01-18 PROCEDURE — 3074F SYST BP LT 130 MM HG: CPT | Performed by: NURSE PRACTITIONER

## 2024-01-18 PROCEDURE — 0509F URINE INCON PLAN DOCD: CPT | Performed by: NURSE PRACTITIONER

## 2024-01-18 PROCEDURE — 99350 HOME/RES VST EST HIGH MDM 60: CPT | Performed by: NURSE PRACTITIONER

## 2024-01-18 PROCEDURE — 3078F DIAST BP <80 MM HG: CPT | Performed by: NURSE PRACTITIONER

## 2024-01-18 PROCEDURE — 1036F TOBACCO NON-USER: CPT | Performed by: NURSE PRACTITIONER

## 2024-01-18 PROCEDURE — G8417 CALC BMI ABV UP PARAM F/U: HCPCS | Performed by: NURSE PRACTITIONER

## 2024-01-18 PROCEDURE — 1090F PRES/ABSN URINE INCON ASSESS: CPT | Performed by: NURSE PRACTITIONER

## 2024-01-19 ASSESSMENT — PATIENT HEALTH QUESTIONNAIRE - PHQ9
SUM OF ALL RESPONSES TO PHQ QUESTIONS 1-9: 0
1. LITTLE INTEREST OR PLEASURE IN DOING THINGS: 0
SUM OF ALL RESPONSES TO PHQ QUESTIONS 1-9: 0
2. FEELING DOWN, DEPRESSED OR HOPELESS: 0
SUM OF ALL RESPONSES TO PHQ QUESTIONS 1-9: 0
SUM OF ALL RESPONSES TO PHQ QUESTIONS 1-9: 0
SUM OF ALL RESPONSES TO PHQ9 QUESTIONS 1 & 2: 0

## 2024-01-19 NOTE — PROGRESS NOTES
This is the Subsequent Medicare Annual Wellness Exam, performed 12 months or more after the Initial AWV or the last Subsequent AWV    I have reviewed the patient's medical history in detail and updated the computerized patient record.       Assessment/Plan   Education and counseling provided:  Are appropriate based on today's review and evaluation and Bone mass measurement (DEXA)    1. Multiple sclerosis (HCC)  2. Chronic bilateral low back pain with right-sided sciatica  3. Age-related osteoporosis without current pathological fracture  4. Mild episode of recurrent major depressive disorder (HCC)  5. Urine, incontinence, stress female  6. Chronic obstructive pulmonary disease, unspecified COPD type (HCC)  7. Primary hypertension  8. Recurrent urinary tract infection       Depression Risk Factor Screening     PHQ-9 Total Score: 0 (1/19/2024  9:08 AM)       Alcohol & Drug Abuse Risk Screen     Do you average more than 1 drink per night or more than 7 drinks a week:  no    On any one occasion in the past three months have you have had more than 3 drinks containing alcohol: no    Functional Ability and Level of Safety   Hearing: hearing is good     Activities of Daily Living:  The home contains: handrails, grab bars, and rugs  The patient needs help with : transportation, shopping, preparing meals, housework, laundry, laundry, and bathroom needs     Ambulation: Patient ambulates with a walker        7/20/2023     3:09 PM   Amb Fall Risk Assessment and TUG Test   Do you feel unsteady or are you worried about falling?  yes   2 or more falls in past year? yes   Fall with injury in past year? no             Abuse Screen: The patient is not abused.     Cognitive Screening    Has your family/caregiver stated any concerns about your memory: yes    Cognitive screening: Normal   Screening test used: Animal Naming - 15    Health Maintenance Due     Health Maintenance Due   Topic Date Due    Respiratory Syncytial Virus (RSV) 
cardiology.    Takes levothyroxine 112mcg daily. Unclear etiology of hypothyroidism; no hx of surgery. TFTs at goal July 2023.   Takes both cymbalta 30mg daily and wellbutrin XL 150mg daily; was reportedly started on cymbalta at MS diagnosis in 2007, and started on wellbutrin \"when I was having a lot of pity for myself.\" Reports that she has been on this regimen for several years and reports that her mood is generally quite good. Denies depressive symptoms. Had been taking tylenol PM to sleep; , trialed gabapentin, which she found to be too sedating and made her feel \"off\", so she stopped it. Also trialed melatonin, but did not find it effective. Started on seroquel 25mg QHS October 2023, which she finds helps without next-day grogginess if she takes it at 8pm, so she has been doing this and no longer taking tylenol PM. Reports \"very pleasant dreams\" on the seroquel. As above, denies depression, irritation, or mood swings.     Osteoporosis - DEXA scan last done Jan 2020, consistent with osteoporosis. On annual Reclast infusions, last done June 2022. Patient and daughter wish to continue these at [facility]. Chart review reveals intolerance of oral bisphosphonates. Reclast infusion scheduled January 2024 but has to be rescheduled due to upcoming dental work. Stressed the importance of this given ongoing use of prednisone.     Has been being treated for UTI continuously since April 2023 through September. Being seen by a provider at [facility] and has been on many rounds of antibiotics. Has been incontinent since that time, though occasionally able to make it to the bedside commode. Saw Dr. Sauceda at [facility] in September and had cystoscopy, which she reports went well. He took her off the PO antibiotics she has been on continuously since April 2023 and put her on myrbetriq 50mg daily, with a 3 month follow up scheduled. Has a Purewick that she wishes to use at night, and is now working well in combination with a female

## 2024-01-23 ENCOUNTER — TELEPHONE (OUTPATIENT)
Facility: CLINIC | Age: 79
End: 2024-01-23

## 2024-01-23 NOTE — TELEPHONE ENCOUNTER
QIW rec'd referral from [facility] clinical team to offer supportive counseling to pt. LCSW called pt's dtrJavon. No answer. LCSW provided contact information, and encouraged a call back at her convenience.     AMIE Dutta, QIW  Licensed Clinical   [facility]  (O) [phone]  (C) [phone]

## 2024-01-26 DIAGNOSIS — F33.0 MILD EPISODE OF RECURRENT MAJOR DEPRESSIVE DISORDER (HCC): ICD-10-CM

## 2024-01-26 DIAGNOSIS — G89.29 CHRONIC BILATERAL LOW BACK PAIN WITH RIGHT-SIDED SCIATICA: ICD-10-CM

## 2024-01-26 DIAGNOSIS — M54.41 CHRONIC BILATERAL LOW BACK PAIN WITH RIGHT-SIDED SCIATICA: ICD-10-CM

## 2024-01-26 RX ORDER — DULOXETIN HYDROCHLORIDE 30 MG/1
CAPSULE, DELAYED RELEASE ORAL
Qty: 90 CAPSULE | Refills: 1 | Status: SHIPPED | OUTPATIENT
Start: 2024-01-26

## 2024-02-09 ENCOUNTER — TELEPHONE (OUTPATIENT)
Facility: CLINIC | Age: 79
End: 2024-02-09

## 2024-02-09 NOTE — TELEPHONE ENCOUNTER
[facility] spoke with pt's dtr, Javon, per Patient Advice Request from [facility] in pt's EMR. Dtr stated that mom has \"been doing really well since the last incident.\" She remarked that mom has been doing \"phenomenal\" since Javon showed pt the messages she was sending the H team in MyChart documenting pt's inappropriate and aggressive behaviors. Javon stated that ever since she showed mom that her behaviors were being reported to the [facility] team, mom has been calm, non-aggressive, has not lashed out at all. She said that pt still gets frustrated in general, but her behaviors are much more appropriate now. Javon states that she had been feeling burned out, and she still reports some feelings of burnout, saying that she has been taking care of mom for 10 years while also working full time as a , but now mom's behaviors are better controlled so there is less tension between them. She denies a need for a visit from [facility] at this time.     [facility] encouraged [facility] to keep [facility] contact information. She said she would, and will reach out to this LCSW in the future should a visit from [facility] for supportive counseling benefit pt and/or family.     AMIE Dutta, [facility]  Licensed Clinical   [facility]  (O) [phone]  (C) [phone]

## 2024-02-14 ENCOUNTER — TELEPHONE (OUTPATIENT)
Facility: CLINIC | Age: 79
End: 2024-02-14

## 2024-02-14 NOTE — TELEPHONE ENCOUNTER
Called patient to confirm their in home visit with Tiffanie Roberts  on 02/19/2024, arrival between 12-4.  Spoke with Jessi Taylor, they confirmed the appointment and answered the covid screen questions. Does anyone in the household have covid No  Have there been any changes to insurance No or location No

## 2024-02-19 ENCOUNTER — OFFICE VISIT (OUTPATIENT)
Facility: CLINIC | Age: 79
End: 2024-02-19
Payer: MEDICARE

## 2024-02-19 VITALS
HEART RATE: 69 BPM | OXYGEN SATURATION: 97 % | RESPIRATION RATE: 20 BRPM | TEMPERATURE: 97.8 F | DIASTOLIC BLOOD PRESSURE: 68 MMHG | SYSTOLIC BLOOD PRESSURE: 108 MMHG

## 2024-02-19 DIAGNOSIS — G35 MULTIPLE SCLEROSIS (HCC): Primary | ICD-10-CM

## 2024-02-19 DIAGNOSIS — F33.0 MILD EPISODE OF RECURRENT MAJOR DEPRESSIVE DISORDER (HCC): ICD-10-CM

## 2024-02-19 DIAGNOSIS — I10 PRIMARY HYPERTENSION: ICD-10-CM

## 2024-02-19 DIAGNOSIS — J44.9 CHRONIC OBSTRUCTIVE PULMONARY DISEASE, UNSPECIFIED COPD TYPE (HCC): ICD-10-CM

## 2024-02-19 DIAGNOSIS — M81.0 AGE-RELATED OSTEOPOROSIS WITHOUT CURRENT PATHOLOGICAL FRACTURE: ICD-10-CM

## 2024-02-19 DIAGNOSIS — G89.29 CHRONIC BILATERAL LOW BACK PAIN WITH RIGHT-SIDED SCIATICA: ICD-10-CM

## 2024-02-19 DIAGNOSIS — M54.41 CHRONIC BILATERAL LOW BACK PAIN WITH RIGHT-SIDED SCIATICA: ICD-10-CM

## 2024-02-19 DIAGNOSIS — E78.5 HYPERLIPIDEMIA, UNSPECIFIED HYPERLIPIDEMIA TYPE: ICD-10-CM

## 2024-02-19 PROCEDURE — 1090F PRES/ABSN URINE INCON ASSESS: CPT | Performed by: NURSE PRACTITIONER

## 2024-02-19 PROCEDURE — G8484 FLU IMMUNIZE NO ADMIN: HCPCS | Performed by: NURSE PRACTITIONER

## 2024-02-19 PROCEDURE — 1036F TOBACCO NON-USER: CPT | Performed by: NURSE PRACTITIONER

## 2024-02-19 PROCEDURE — 3074F SYST BP LT 130 MM HG: CPT | Performed by: NURSE PRACTITIONER

## 2024-02-19 PROCEDURE — G8417 CALC BMI ABV UP PARAM F/U: HCPCS | Performed by: NURSE PRACTITIONER

## 2024-02-19 PROCEDURE — 3078F DIAST BP <80 MM HG: CPT | Performed by: NURSE PRACTITIONER

## 2024-02-19 PROCEDURE — 99349 HOME/RES VST EST MOD MDM 40: CPT | Performed by: NURSE PRACTITIONER

## 2024-02-19 PROCEDURE — 1123F ACP DISCUSS/DSCN MKR DOCD: CPT | Performed by: NURSE PRACTITIONER

## 2024-02-19 RX ORDER — PREDNISONE 5 MG/1
5 TABLET ORAL DAILY
Qty: 90 TABLET | Refills: 1 | Status: SHIPPED | OUTPATIENT
Start: 2024-02-19

## 2024-02-19 NOTE — PROGRESS NOTES
[facility]    [phone]      NOTE: [facility] is a PROVIDER (MD/NP) based interdisciplinary practice (RN, LCSW) for patients who cannot access (or have a taxing effort) primary / speciality medical care in an office setting. [facility] is NOT Home Health Care but works with Home Health Care Agencies.when there is a skilled need. Our MD/NPs are integral in Care Transitions; PLEASE CALL  if this patient arrives in the Emergency Department or Hospital.        Date of Current Visit: 24  Patient/Family present for Current Visit: patient only  Goals of Care as stated by the patient/family is: to improve functional status     Preferences for hospitalization if that were to be necessary:  [] Patient DOES NOT WANT hospitalization; focus all treatments at home  [x] Patient WOULD WANT hospitalization for potentially reversible causes  Patient prefers hospitalization at: [facility]    Jessi Taylor (: 1945) is a 78 y.o. female, patient, here for evaluation of the following chief complaint(s):  Follow-up       ASSESSMENT/PLAN:  Below is the assessment and plan developed based on review of pertinent history, physical exam, labs, studies, and medications.    1. Multiple sclerosis (HCC)  -     predniSONE (DELTASONE) 5 MG tablet; Take 1 tablet by mouth daily, Disp-90 tablet, R-1Normal  2. Mild episode of recurrent major depressive disorder (HCC)  3. Age-related osteoporosis without current pathological fracture  4. Chronic bilateral low back pain with right-sided sciatica  5. Chronic obstructive pulmonary disease, unspecified COPD type (HCC)  6. Primary hypertension  7. Hyperlipidemia, unspecified hyperlipidemia type      Multiple Sclerosis - Previously followed by Dr. Garsia, [facility] neurology. No longer on disease-modifying medications. Tylenol and tramadol helping with chronic pain; controlled substance agreement and urine toxicology

## 2024-03-12 ENCOUNTER — CLINICAL DOCUMENTATION (OUTPATIENT)
Facility: CLINIC | Age: 79
End: 2024-03-12

## 2024-03-12 NOTE — PROGRESS NOTES
tablet Take 1 tab by mouth daily for blood pressure.    nystatin (MYCOSTATIN) 213850 UNIT/GM powder Apply 3 times daily. (Patient taking differently: Apply 3 times daily  to skin fold areas such as between legs, under stomach folds)    aspirin 81 MG EC tablet Take 1 tablet by mouth daily    brinzolamide (AZOPT) 1 % ophthalmic suspension Place 1 drop into both eyes 2 times daily    cyanocobalamin 1000 MCG tablet Take 1 tablet by mouth daily    cycloSPORINE (RESTASIS) 0.05 % ophthalmic emulsion Place 1 drop into both eyes 2 times daily    latanoprost (XALATAN) 0.005 % ophthalmic solution Place 1 drop into both eyes daily    timolol (TIMOPTIC) 0.5 % ophthalmic solution INSTILL 1 DROP INTO INTO BOTH EYES TWICE A DAY     No current facility-administered medications for this visit.       PLAN OF CARE    The Plan of Care is initiated within 15 days of the initial provider visit and updated at least every 60 days or sooner, based on patient's changing condition.  Plan of Care Orders / Action Items:  Refer to most recent provider visit note for specifics re: plan of care.  No recent changes to plan of care          Estimated Visit Frequency:  Every 2 months  SW visits PRN

## 2024-03-21 DIAGNOSIS — G35 MULTIPLE SCLEROSIS (HCC): ICD-10-CM

## 2024-03-21 RX ORDER — TRAMADOL HYDROCHLORIDE 50 MG/1
50 TABLET ORAL 2 TIMES DAILY PRN
Qty: 60 TABLET | Refills: 0 | Status: SHIPPED | OUTPATIENT
Start: 2024-03-21 | End: 2024-05-20

## 2024-04-07 ENCOUNTER — APPOINTMENT (OUTPATIENT)
Facility: HOSPITAL | Age: 79
End: 2024-04-07
Payer: MEDICARE

## 2024-04-07 ENCOUNTER — HOSPITAL ENCOUNTER (EMERGENCY)
Facility: HOSPITAL | Age: 79
Discharge: HOME | End: 2024-04-07
Attending: STUDENT IN AN ORGANIZED HEALTH CARE EDUCATION/TRAINING PROGRAM
Payer: MEDICARE

## 2024-04-07 VITALS
DIASTOLIC BLOOD PRESSURE: 67 MMHG | TEMPERATURE: 98.6 F | OXYGEN SATURATION: 92 % | HEART RATE: 61 BPM | SYSTOLIC BLOOD PRESSURE: 119 MMHG | RESPIRATION RATE: 12 BRPM

## 2024-04-07 DIAGNOSIS — R07.89 CHEST PAIN, MUSCULOSKELETAL: Primary | ICD-10-CM

## 2024-04-07 LAB
ALBUMIN SERPL-MCNC: 3.5 G/DL (ref 3.5–5)
ALBUMIN/GLOB SERPL: 0.9 (ref 1.1–2.2)
ALP SERPL-CCNC: 57 U/L (ref 45–117)
ALT SERPL-CCNC: 23 U/L (ref 12–78)
ANION GAP SERPL CALC-SCNC: 5 MMOL/L (ref 5–15)
AST SERPL-CCNC: 13 U/L (ref 15–37)
BASOPHILS # BLD: 0 K/UL (ref 0–0.1)
BASOPHILS NFR BLD: 0 % (ref 0–1)
BILIRUB SERPL-MCNC: 0.5 MG/DL (ref 0.2–1)
BUN SERPL-MCNC: 21 MG/DL (ref 6–20)
BUN/CREAT SERPL: 21 (ref 12–20)
CALCIUM SERPL-MCNC: 10.1 MG/DL (ref 8.5–10.1)
CHLORIDE SERPL-SCNC: 104 MMOL/L (ref 97–108)
CO2 SERPL-SCNC: 31 MMOL/L (ref 21–32)
COMMENT:: NORMAL
CREAT SERPL-MCNC: 0.99 MG/DL (ref 0.55–1.02)
DIFFERENTIAL METHOD BLD: ABNORMAL
EOSINOPHIL # BLD: 0.1 K/UL (ref 0–0.4)
EOSINOPHIL NFR BLD: 1 % (ref 0–7)
ERYTHROCYTE [DISTWIDTH] IN BLOOD BY AUTOMATED COUNT: 13.3 % (ref 11.5–14.5)
GLOBULIN SER CALC-MCNC: 3.7 G/DL (ref 2–4)
GLUCOSE SERPL-MCNC: 106 MG/DL (ref 65–100)
HCT VFR BLD AUTO: 47.9 % (ref 35–47)
HGB BLD-MCNC: 16 G/DL (ref 11.5–16)
IMM GRANULOCYTES # BLD AUTO: 0 K/UL (ref 0–0.04)
IMM GRANULOCYTES NFR BLD AUTO: 0 % (ref 0–0.5)
LYMPHOCYTES # BLD: 0.8 K/UL (ref 0.8–3.5)
LYMPHOCYTES NFR BLD: 12 % (ref 12–49)
MCH RBC QN AUTO: 30.9 PG (ref 26–34)
MCHC RBC AUTO-ENTMCNC: 33.4 G/DL (ref 30–36.5)
MCV RBC AUTO: 92.6 FL (ref 80–99)
MONOCYTES # BLD: 0.4 K/UL (ref 0–1)
MONOCYTES NFR BLD: 6 % (ref 5–13)
NEUTS SEG # BLD: 5.5 K/UL (ref 1.8–8)
NEUTS SEG NFR BLD: 81 % (ref 32–75)
NRBC # BLD: 0 K/UL (ref 0–0.01)
NRBC BLD-RTO: 0 PER 100 WBC
PLATELET # BLD AUTO: 224 K/UL (ref 150–400)
PMV BLD AUTO: 11 FL (ref 8.9–12.9)
POTASSIUM SERPL-SCNC: 3.7 MMOL/L (ref 3.5–5.1)
PROT SERPL-MCNC: 7.2 G/DL (ref 6.4–8.2)
RBC # BLD AUTO: 5.17 M/UL (ref 3.8–5.2)
SODIUM SERPL-SCNC: 140 MMOL/L (ref 136–145)
SPECIMEN HOLD: NORMAL
TROPONIN I SERPL HS-MCNC: 5 NG/L (ref 0–51)
TROPONIN I SERPL HS-MCNC: 6 NG/L (ref 0–51)
WBC # BLD AUTO: 6.9 K/UL (ref 3.6–11)

## 2024-04-07 PROCEDURE — 93005 ELECTROCARDIOGRAM TRACING: CPT | Performed by: STUDENT IN AN ORGANIZED HEALTH CARE EDUCATION/TRAINING PROGRAM

## 2024-04-07 PROCEDURE — 99285 EMERGENCY DEPT VISIT HI MDM: CPT

## 2024-04-07 PROCEDURE — 80053 COMPREHEN METABOLIC PANEL: CPT

## 2024-04-07 PROCEDURE — 85025 COMPLETE CBC W/AUTO DIFF WBC: CPT

## 2024-04-07 PROCEDURE — 94760 N-INVAS EAR/PLS OXIMETRY 1: CPT

## 2024-04-07 PROCEDURE — 36415 COLL VENOUS BLD VENIPUNCTURE: CPT

## 2024-04-07 PROCEDURE — 71045 X-RAY EXAM CHEST 1 VIEW: CPT

## 2024-04-07 PROCEDURE — 6360000002 HC RX W HCPCS: Performed by: STUDENT IN AN ORGANIZED HEALTH CARE EDUCATION/TRAINING PROGRAM

## 2024-04-07 PROCEDURE — 84484 ASSAY OF TROPONIN QUANT: CPT

## 2024-04-07 PROCEDURE — 96374 THER/PROPH/DIAG INJ IV PUSH: CPT

## 2024-04-07 RX ORDER — KETOROLAC TROMETHAMINE 30 MG/ML
15 INJECTION, SOLUTION INTRAMUSCULAR; INTRAVENOUS ONCE
Status: COMPLETED | OUTPATIENT
Start: 2024-04-07 | End: 2024-04-07

## 2024-04-07 RX ADMIN — KETOROLAC TROMETHAMINE 15 MG: 30 INJECTION, SOLUTION INTRAMUSCULAR; INTRAVENOUS at 17:09

## 2024-04-07 ASSESSMENT — PAIN SCALES - GENERAL
PAINLEVEL_OUTOF10: 7
PAINLEVEL_OUTOF10: 2
PAINLEVEL_OUTOF10: 0

## 2024-04-07 ASSESSMENT — PAIN DESCRIPTION - ONSET: ONSET: SUDDEN

## 2024-04-07 ASSESSMENT — PAIN DESCRIPTION - ORIENTATION
ORIENTATION: LEFT
ORIENTATION: LEFT;MID

## 2024-04-07 ASSESSMENT — PAIN DESCRIPTION - DESCRIPTORS
DESCRIPTORS: SHARP
DESCRIPTORS: ACHING

## 2024-04-07 ASSESSMENT — PAIN DESCRIPTION - LOCATION
LOCATION: CHEST
LOCATION: CHEST

## 2024-04-07 ASSESSMENT — PAIN DESCRIPTION - FREQUENCY: FREQUENCY: CONTINUOUS

## 2024-04-07 ASSESSMENT — PAIN - FUNCTIONAL ASSESSMENT
PAIN_FUNCTIONAL_ASSESSMENT: ACTIVITIES ARE NOT PREVENTED
PAIN_FUNCTIONAL_ASSESSMENT: ACTIVITIES ARE NOT PREVENTED
PAIN_FUNCTIONAL_ASSESSMENT: 0-10

## 2024-04-07 NOTE — ED NOTES
Pt given discharge instructions and taken to the ER exit for discharge via wheelchair. Pts daughter is providing transportation home.

## 2024-04-07 NOTE — ED TRIAGE NOTES
Pt arrives to the ED from home for 2/10 L sided CP starting 1hr ago. Pt took 6 81mg aspirin tablets at onset. Hx of MS. Hx of MI about 10 years ago.

## 2024-04-07 NOTE — ED PROVIDER NOTES
HPI    79 y.o. female presenting with anterior chest discomfort on the left side of her chest after she sat forward trying to get out of bed for the day.  She has had no associated diaphoresis, nausea, shortness of breath.  States the pain is improved.  Started about an hour and a half prior to arrival.  She denies fevers, chills or recent illnesses.  She does have a history of CAD.    Jessi Taylor   has a past medical history of CAD (coronary artery disease), Chronic pain, Depression, Dyspepsia and other specified disorders of function of stomach, GERD (gastroesophageal reflux disease), Glaucoma, Hx of migraine headaches, Hyperlipidemia, Hypertension, Hypothyroidism, IFG (impaired fasting glucose), Lymphocytic colitis, Macular degeneration, Morbid obesity (HCC), Multiple sclerosis (HCC), Normal cardiac stress test, Osteoarthritis, Other ill-defined conditions(799.89), Psoriasis, Psychiatric disorder, Renal stones, Thyroid disease, and Urinary incontinence.       Physical Exam  Vitals reviewed.   Constitutional:       General: She is not in acute distress.     Appearance: Normal appearance.   HENT:      Head: Normocephalic.      Mouth/Throat:      Mouth: Mucous membranes are moist.   Eyes:      Extraocular Movements: Extraocular movements intact.      Pupils: Pupils are equal, round, and reactive to light.   Cardiovascular:      Rate and Rhythm: Normal rate and regular rhythm.      Pulses: Normal pulses.      Comments: Chest wall tenderness left  Pulmonary:      Effort: Pulmonary effort is normal. No respiratory distress.   Abdominal:      General: Abdomen is flat.   Musculoskeletal:      Cervical back: Neck supple. No rigidity.      Right lower leg: No edema.      Left lower leg: No edema.   Skin:     General: Skin is warm.      Capillary Refill: Capillary refill takes less than 2 seconds.   Neurological:      General: No focal deficit present.      Mental Status: She is alert. Mental status is at baseline.

## 2024-04-08 LAB
EKG ATRIAL RATE: 69 BPM
EKG DIAGNOSIS: NORMAL
EKG P AXIS: 77 DEGREES
EKG P-R INTERVAL: 146 MS
EKG Q-T INTERVAL: 366 MS
EKG QRS DURATION: 88 MS
EKG QTC CALCULATION (BAZETT): 392 MS
EKG R AXIS: -32 DEGREES
EKG T AXIS: 83 DEGREES
EKG VENTRICULAR RATE: 69 BPM

## 2024-04-08 PROCEDURE — 93010 ELECTROCARDIOGRAM REPORT: CPT | Performed by: INTERNAL MEDICINE

## 2024-04-17 ENCOUNTER — TELEPHONE (OUTPATIENT)
Facility: CLINIC | Age: 79
End: 2024-04-17

## 2024-04-17 NOTE — TELEPHONE ENCOUNTER
Called patient to confirm their in home visit with Tiffanie Roberts on 04/22/2024, arrival between 12-4.  Spoke with Jessi Taylor, they confirmed the appointment and answered the covid screen questions. Does anyone in the household have covid No  Have there been any changes to insurance No or location No

## 2024-04-22 ENCOUNTER — OFFICE VISIT (OUTPATIENT)
Facility: CLINIC | Age: 79
End: 2024-04-22
Payer: MEDICARE

## 2024-04-22 ENCOUNTER — HOME HEALTH ADMISSION (OUTPATIENT)
Dept: HOME HEALTH SERVICES | Facility: HOME HEALTH | Age: 79
End: 2024-04-22
Payer: MEDICARE

## 2024-04-22 VITALS
SYSTOLIC BLOOD PRESSURE: 132 MMHG | OXYGEN SATURATION: 100 % | TEMPERATURE: 98.5 F | RESPIRATION RATE: 20 BRPM | HEART RATE: 62 BPM | DIASTOLIC BLOOD PRESSURE: 80 MMHG

## 2024-04-22 DIAGNOSIS — F33.0 MILD EPISODE OF RECURRENT MAJOR DEPRESSIVE DISORDER (HCC): ICD-10-CM

## 2024-04-22 DIAGNOSIS — N39.3 URINE, INCONTINENCE, STRESS FEMALE: ICD-10-CM

## 2024-04-22 DIAGNOSIS — E03.9 HYPOTHYROIDISM, UNSPECIFIED TYPE: ICD-10-CM

## 2024-04-22 DIAGNOSIS — I10 PRIMARY HYPERTENSION: ICD-10-CM

## 2024-04-22 DIAGNOSIS — I25.10 CORONARY ARTERY DISEASE INVOLVING NATIVE CORONARY ARTERY OF NATIVE HEART WITHOUT ANGINA PECTORIS: ICD-10-CM

## 2024-04-22 DIAGNOSIS — M81.0 AGE-RELATED OSTEOPOROSIS WITHOUT CURRENT PATHOLOGICAL FRACTURE: ICD-10-CM

## 2024-04-22 DIAGNOSIS — J44.9 CHRONIC OBSTRUCTIVE PULMONARY DISEASE, UNSPECIFIED COPD TYPE (HCC): ICD-10-CM

## 2024-04-22 DIAGNOSIS — G35 MULTIPLE SCLEROSIS (HCC): Primary | ICD-10-CM

## 2024-04-22 DIAGNOSIS — N32.81 OVERACTIVE DETRUSOR: ICD-10-CM

## 2024-04-22 PROCEDURE — 1036F TOBACCO NON-USER: CPT | Performed by: NURSE PRACTITIONER

## 2024-04-22 PROCEDURE — 3079F DIAST BP 80-89 MM HG: CPT | Performed by: NURSE PRACTITIONER

## 2024-04-22 PROCEDURE — 1123F ACP DISCUSS/DSCN MKR DOCD: CPT | Performed by: NURSE PRACTITIONER

## 2024-04-22 PROCEDURE — 3075F SYST BP GE 130 - 139MM HG: CPT | Performed by: NURSE PRACTITIONER

## 2024-04-22 PROCEDURE — 0509F URINE INCON PLAN DOCD: CPT | Performed by: NURSE PRACTITIONER

## 2024-04-22 PROCEDURE — 1090F PRES/ABSN URINE INCON ASSESS: CPT | Performed by: NURSE PRACTITIONER

## 2024-04-22 PROCEDURE — G8417 CALC BMI ABV UP PARAM F/U: HCPCS | Performed by: NURSE PRACTITIONER

## 2024-04-22 PROCEDURE — 99350 HOME/RES VST EST HIGH MDM 60: CPT | Performed by: NURSE PRACTITIONER

## 2024-04-22 RX ORDER — ALENDRONATE SODIUM 70 MG/1
70 TABLET ORAL
Qty: 13 TABLET | Refills: 1 | Status: SHIPPED | OUTPATIENT
Start: 2024-04-22

## 2024-04-22 NOTE — PROGRESS NOTES
[facility]    [phone]      NOTE: [facility] is a PROVIDER (MD/NP) based interdisciplinary practice (RN, LCSW) for patients who cannot access (or have a taxing effort) primary / speciality medical care in an office setting. [facility] is NOT Home Health Care but works with Home Health Care Agencies.when there is a skilled need. Our MD/NPs are integral in Care Transitions; PLEASE CALL  if this patient arrives in the Emergency Department or Hospital.        Date of Current Visit: 24  Patient/Family present for Current Visit: patient only  Goals of Care as stated by the patient/family is: to improve functional status     Preferences for hospitalization if that were to be necessary:  [] Patient DOES NOT WANT hospitalization; focus all treatments at home  [x] Patient WOULD WANT hospitalization for potentially reversible causes  Patient prefers hospitalization at: [facility]    Jessi Taylor (: 1945) is a 79 y.o. female, patient, here for evaluation of the following chief complaint(s):  Follow-up       ASSESSMENT/PLAN:  Below is the assessment and plan developed based on review of pertinent history, physical exam, labs, studies, and medications.    1. Multiple sclerosis (HCC)  -     [facility]  2. Age-related osteoporosis without current pathological fracture  -     [facility]  -     alendronate (FOSAMAX) 70 MG tablet; Take 1 tablet by mouth every 7 days Take with full glass of water and remain upright after taking for 30 - 60 minutes., Disp-13 tablet, R-1Normal  3. Chronic obstructive pulmonary disease, unspecified COPD type ([facility])  4. Coronary artery disease involving native coronary artery of native heart without angina pectoris  5. Overactive detrusor  6. Mild episode of recurrent major depressive disorder ([facility])  7. Urine, incontinence, stress female  8.

## 2024-04-23 ENCOUNTER — HOME CARE VISIT (OUTPATIENT)
Facility: HOME HEALTH | Age: 79
End: 2024-04-23
Payer: MEDICARE

## 2024-04-23 VITALS
RESPIRATION RATE: 15 BRPM | OXYGEN SATURATION: 98 % | DIASTOLIC BLOOD PRESSURE: 69 MMHG | HEART RATE: 77 BPM | SYSTOLIC BLOOD PRESSURE: 115 MMHG | TEMPERATURE: 97.8 F

## 2024-04-23 PROCEDURE — G0151 HHCP-SERV OF PT,EA 15 MIN: HCPCS

## 2024-04-24 ENCOUNTER — HOME CARE VISIT (OUTPATIENT)
Facility: HOME HEALTH | Age: 79
End: 2024-04-24
Payer: MEDICARE

## 2024-04-24 PROCEDURE — G0152 HHCP-SERV OF OT,EA 15 MIN: HCPCS

## 2024-04-24 ASSESSMENT — ENCOUNTER SYMPTOMS
DYSPNEA ACTIVITY LEVEL: AFTER AMBULATING 10 - 20 FT
PAIN LOCATION - PAIN QUALITY: ACHY

## 2024-04-25 NOTE — HOME HEALTH
reviewed orientation to home health booklet with patient/caregiver including agency phone number, agency complaint process, state hotline number, as well as joint commission's quality hotline number. Consent forms signed.   Patient at risk for falls:  Yes  Recommended requesting PT orders due to fall risk YES:   Patient response to recommended requesting of PT orders: Agreed with POC  Discharge planning discussed with patient and caregiver. Discharge planning as follows: Will discharge when the patient has reached their maximum functional potential and maximum safety in their home Patient/caregiver did verbalize agreement with discharge planning.   Clinical Assessment (What this means for the patient overall and need for ongoing skilled care: Patient is an 79  Year-old female with a H/O recent MD office visit (Multiple sclerosis, Age-related osteoporosis without current pathological fracture) admitted for skilled PT/OT at home. Patient now presents with dec ambulation/unsteady gait, dec BLE strength, dec transfer, dec standing balance and unable to get in/out of the house safely. Patient lives in a 2 story home (patient lives in a first floor bedroom and got a bathroom at the same level) with her daughter and got a ramp to get in/out of the house. Patient was Mod independent in her ambulation using a RW prior to the few weeks and currently need assistance from the caregiver to get in/out of the house. Patient will benefit from skilled PT at this time to address her functional deficits so that the patient can transfer/ambulate inside the house /outside the house without assistance from the caregiver with less risk of falling.    Specific plan for next visit:  BLE strengthening exercises,  transfer training, gait training    - so that the patient can  ambulate/transfer with less possible assistance from the caregiver.  Plan of care and admission to home health status called to attending physician. The following

## 2024-04-29 VITALS
HEART RATE: 71 BPM | SYSTOLIC BLOOD PRESSURE: 120 MMHG | OXYGEN SATURATION: 98 % | TEMPERATURE: 98.1 F | DIASTOLIC BLOOD PRESSURE: 76 MMHG

## 2024-04-29 DIAGNOSIS — M81.0 AGE-RELATED OSTEOPOROSIS WITHOUT CURRENT PATHOLOGICAL FRACTURE: ICD-10-CM

## 2024-04-29 RX ORDER — ALENDRONATE SODIUM 70 MG/1
70 TABLET ORAL
Qty: 13 TABLET | Refills: 1 | Status: SHIPPED | OUTPATIENT
Start: 2024-04-29

## 2024-04-29 ASSESSMENT — ENCOUNTER SYMPTOMS: PAIN LOCATION - PAIN QUALITY: ACHE, SORE

## 2024-04-29 NOTE — HOME HEALTH
Reason for referral, [facility] SUMMARY of clinical condition:   Pt. seen by NP on 04/22/24 and referred to home health O.T. and P.T. for evaluation for power mobility and other potential needs.   Pt. has a h/o of MS with impaired moiblity and h/o falls.    Clinical Assessment/Skilled reason for admission to home health (What this means for the patient overall and need for ongoing skilled care):   Pt. resides on the main level of a 2 story home.  Pt.'s daughter and granddaughter live upstairs, but go to work and school respectively and are not home during the day.   Pt. reports using her rollator for short distance ambulation on her good days and staying in bed on her bad days.   Pt. owns 2 manual wheelchairs, but is not able to use them due to extremity weakness and UE pain with use.   Pt. has a wheelchair ramp which is used if someone else can push pt. up and down it, but pt. generally does not leave home unless going out for MD appts.   Pt. demonstrates unsafe ambulation secondary to dragging her left foot.   Pt. reports sitting on the seat of her rollator in the kitchen for meal prep.   Pt. reports all of her recent past falls have occured in the shower.   O.T. assessed shower set up and pt. has to step over a threshold to get on her shower seat.   O.T. instructed pt. to purchase a transfer tub bench for safe shower transfers.   Pt. in agreement about the transfer tub bench and states she robert purchase as recommended.   Pt. will benefit from O.T. intervention for DME recommendations, shower transfer training, functional mobilty training, ADL and IADL training and power w/c assessment.   See Interventions / Goals for additional details.    Functional Mobility:  Bed Mobility (rolling/scooting): Supervision or Touching Assistance  Transfers (supine to chair and return to supine): Minimal /Moderate Assistance (requires assistance with less than 50% of the effort).  Patient uses device: yes  Gait:  Ambulates with minimal

## 2024-04-30 ENCOUNTER — CLINICAL DOCUMENTATION (OUTPATIENT)
Facility: CLINIC | Age: 79
End: 2024-04-30

## 2024-04-30 ENCOUNTER — HOME CARE VISIT (OUTPATIENT)
Facility: HOME HEALTH | Age: 79
End: 2024-04-30
Payer: MEDICARE

## 2024-04-30 VITALS
TEMPERATURE: 97.9 F | DIASTOLIC BLOOD PRESSURE: 78 MMHG | OXYGEN SATURATION: 100 % | RESPIRATION RATE: 16 BRPM | SYSTOLIC BLOOD PRESSURE: 132 MMHG | HEART RATE: 72 BPM

## 2024-04-30 PROCEDURE — G0157 HHC PT ASSISTANT EA 15: HCPCS

## 2024-04-30 NOTE — PROGRESS NOTES
[facility] - Plan of Care  [address], [address]  [place] [address]    [facility] Team Members: Cornel Hines MD; Mireya Lane MD; Tiffanie Roberts NP; Glenna Belcher NP; Raven Hines, RN; Lanedn Nielson, RN; Stefany Hidalgo, RN; Sandy Almanza LCSW    Jessi Taylor  1945 / [id number]  female    Date of Initial Visit (Start of Care): 9/13/2023    Diagnoses  Patient Active Problem List   Diagnosis    Severe obesity (BMI 35.0-39.9) with comorbidity (HCC)    Mild episode of recurrent major depressive disorder (HCC)    CAD (coronary artery disease)    Renal stones    Lymphocytic colitis    HTN (hypertension)    Overactive detrusor    Prolapse of vaginal wall with midline cystocele    Left-sided weakness    History of colitis    Erosive oral lichen planus    Functional incontinence    Multiple sclerosis (HCC)    Multiple joint pain    Chronic bilateral low back pain with right-sided sciatica    Arthritis    Hypothyroidism    Hx of migraine headaches    RLS (restless legs syndrome)    Chronic edema    Hyperlipidemia    Other specified glaucoma    Urinary retention    Urine, incontinence, stress female    Osteoporosis    Chronic obstructive pulmonary disease (HCC)    Chronic right shoulder pain       Advance Care Planning:    Code Status: Full Code        Primary Decision Maker: Javon [address] - Child - [phone]    Secondary Decision Maker: Fercho Taylor - [phone]       4/30/2024     9:28 AM   Demographics   Marital Status Legally        DME/Supplies:  Bedside Commode, Lift chair, Rolling walker, and Walker     Allergies   Allergen Reactions    Alpha-Gal     Brimonidine Other (See Comments)     Severe reaction and damage to the cornea    Adhesive Tape Rash       Nutritional Requirements:   Oral with supported meal preparation    Functional/Activity Level:  Ambulatory with assistance of cane, walker and/or support of another person (significant impairment)    Safety

## 2024-04-30 NOTE — HOME HEALTH
Subjective:  Patient denies pain of any kind today.  Patient states that this is the best she has felt in a year.  Patient notes frequent Rollator ambulation in home since last PT visit.  Falls since last visit: NO  Caregiver involvement changes: NO  Home health supplies by type and quantity ordered/delivered this visit include: NO    Clinician asked if patient has had any physician contact since last home care visit and patient states: NO  Clinician asked if patient has any new or changed medications and patient states: NO  If Yes, were medications reconciled? N/A  Was the certifying physician notified of changes in medications? N/A    Clinical assessment (what this visit means for the patient overall and need for ongoing skilled care) and progress or lack of progress towards SPECIFIC goals:  Patient had poor technique and safety awareness with sit <> stand transfers from lift chair today but required no physical assistance.  Patient was able to improve gait pattern with cueing for posture/Rollator placement and to slow pace for safety/energy conservation with Rollator gait training in home today.  Patient required cueing for technique, prompting, posture and to use slow controlled movements with performance of all BLE therapeutic strength exercises today.  Patient's poor safety awareness with transfers and household ambulation; and compromised BLE strength and overall balance make patient a high risk for falls.    Written Teaching Material Utilized: Patient/CG received written/pictorial HEP for all sitting and supported standing therapeutic strength exercises performed today.    Interdisciplinary communication with: SIMRAN Henning re:  PT POC    Discharge planning as follows:  Discharge when all PT goals are met or maximum benefit achieved with PT.    Specific plan for next visit: Gait, transfer, strength, balance, pain management and fall prevention training as tolerated next session.

## 2024-05-01 ENCOUNTER — HOME CARE VISIT (OUTPATIENT)
Facility: HOME HEALTH | Age: 79
End: 2024-05-01
Payer: MEDICARE

## 2024-05-01 PROCEDURE — G0152 HHCP-SERV OF OT,EA 15 MIN: HCPCS

## 2024-05-02 ENCOUNTER — HOME CARE VISIT (OUTPATIENT)
Facility: HOME HEALTH | Age: 79
End: 2024-05-02
Payer: MEDICARE

## 2024-05-02 ENCOUNTER — TELEPHONE (OUTPATIENT)
Facility: CLINIC | Age: 79
End: 2024-05-02

## 2024-05-02 VITALS
DIASTOLIC BLOOD PRESSURE: 72 MMHG | RESPIRATION RATE: 16 BRPM | SYSTOLIC BLOOD PRESSURE: 133 MMHG | TEMPERATURE: 97.9 F | HEART RATE: 86 BPM | OXYGEN SATURATION: 98 %

## 2024-05-02 PROCEDURE — G0157 HHC PT ASSISTANT EA 15: HCPCS

## 2024-05-02 PROCEDURE — G0152 HHCP-SERV OF OT,EA 15 MIN: HCPCS

## 2024-05-02 ASSESSMENT — ENCOUNTER SYMPTOMS: PAIN LOCATION - PAIN QUALITY: ACHE

## 2024-05-02 NOTE — HOME HEALTH
Subjective:  Patient reports significant low back pain and decreased overall strength today.  Patient notes limited f/u with HEP and Amb Program since last PT visit.  Falls since last visit: NO  Caregiver involvement changes: NO  Home health supplies by type and quantity ordered/delivered this visit include: NO    Clinician asked if patient has had any physician contact since last home care visit and patient states: NO  Clinician asked if patient has any new or changed medications and patient states: NO  If Yes, were medications reconciled? N/A  Was the certifying physician notified of changes in medications? N/A    Clinical assessment (what this visit means for the patient overall and need for ongoing skilled care) and progress or lack of progress towards SPECIFIC goals:  Patient presents with decreased overall strength today.  Patient required frequent cueing to avoid L foot drag with Rollator gait training today.  Patient's poor safety awareness with transfers and household ambulation; and compromised BLE strength and overall balance make patient a high risk for falls.    Written Teaching Material Utilized: Patient/CG received written/pictorial HEP for all sitting and supported standing therapeutic strength exercises performed today.    Interdisciplinary communication with: N/A    Discharge planning as follows:  Discharge when all PT goals are met or maximum benefit achieved with PT.    Specific plan for next visit: Gait, transfer, strength, balance, pain management and fall prevention training as tolerated next session.

## 2024-05-02 NOTE — TELEPHONE ENCOUNTER
Fax received from [facility] requesting Rx for Fosamax.   Per chart review, the prescription was sent by Tiffanie Roberts NP on 4/29/24.   Pharmacist informed that the prescription did not transmit.  I provided Verbal Order for Fosamax 70mg tablets - Take 1 tab by mouth every 7 days. Take with full glass of water and remain upright after taking for 30-60 minutes. Dispense 12 tabs, 1 refill.

## 2024-05-03 VITALS
OXYGEN SATURATION: 96 % | DIASTOLIC BLOOD PRESSURE: 70 MMHG | HEART RATE: 67 BPM | SYSTOLIC BLOOD PRESSURE: 130 MMHG | TEMPERATURE: 97.8 F

## 2024-05-03 VITALS
DIASTOLIC BLOOD PRESSURE: 76 MMHG | OXYGEN SATURATION: 98 % | TEMPERATURE: 98 F | HEART RATE: 64 BPM | SYSTOLIC BLOOD PRESSURE: 124 MMHG

## 2024-05-03 ASSESSMENT — ENCOUNTER SYMPTOMS
PAIN LOCATION - PAIN QUALITY: ACHE, SORE
PAIN LOCATION - PAIN QUALITY: ACHE

## 2024-05-03 NOTE — HOME HEALTH
Subjective:  \"I'm going to send it back.\"   Pt. states there's no where to place a shower curtain with the transfer tub bench.   O.T. instructed on how this would be managed and urged pt. not to send it back as this appears to be the safest option for shower transfers / bathing tasks.  Falls since last visit:  None.  (if yes complete the Fall Tracking Form and include bsrifallreport):   Caregiver involvement changes:  None.  Home health supplies by type and quantity ordered/delivered this visit include:  N/A.    Clinician asked if patient has had any physician contact since last home care visit and patient states:  No.  Clinician asked if patient has any new or changed medications and patient states:  No.  If Yes, were medications reconciled?  N/A.  Was the certifying physician notified of changes in medications?  N/A.    Clinical assessment (what this visit means for the patient overall and need for ongoing skilled care) and progress or lack of progress towards SPECIFIC goals:   O.T re-instructed pt. on how the transfer tub bench will work, how the shower curtain will be placed and reminded pt. that her last 3 falls all occured with shower transfers and that the transfer tub bench is the safest option.   Pt. states she will have someone assemble it this weekend.  O.T. assessed kitchen mobilty and pt.'s abilty to prepare meals.   Pt. has already made several accomodations to manage meal prep while seated on her rollator due to very limited standing tolerence.   Pt. demonstrated safe kitchen mobilty / simple meal prep and has achieved IADL goal.   O.T. instructed pt. on use of adaptive devices to increase safety and independence with lower body dressing.   Pt. demonstrates overall progress and will benefit from continued O.T intervention to maximize overall safety and independence.   See Interventions / Goals for additional details.    Written Teaching Material Utilized: None this visit.    Interdisciplinary

## 2024-05-03 NOTE — HOME HEALTH
Subjective:  Pt. states she can't wait to get a power wheelchair as it will be so helpful to her.  Falls since last visit:  None.  (if yes complete the Fall Tracking Form and include bsrifallreport):   Caregiver involvement changes: None   Home health supplies by type and quantity ordered/delivered this visit include:  N/A.    Clinician asked if patient has had any physician contact since last home care visit and patient states: No.  Clinician asked if patient has any new or changed medications and patient states:  No.  If Yes, were medications reconciled?  N/A.  Was the certifying physician notified of changes in medications?  N/A.    Clinical assessment (what this visit means for the patient overall and need for ongoing skilled care) and progress or lack of progress towards SPECIFIC goals:  Pt. seen for power wheelchair assessment with [employer] from [employer].   O.T. instructed pt. on features of the wheelchair and process of obtaining in addition to the benefit of use for energy conservation and to help reduce fall risk.  ATP obtained measurements and also assessed doorways in the home to assure access.   Pt. states she will get the transfer tub bench assembled this weekend for shower transfer training / ADL training next week.  Pt. will continue to benefit from O.T. intervention focusing on shower transfer training and ADL training.  See Interventions / Goals for additional details.    Written Teaching Material Utilized:  None this visit.    Interdisciplinary communication with: None this visit.    Discharge planning as follows: When goals met or max benefit achieved.    Specific plan for next visit: shower transfer training / ADL training.

## 2024-05-06 ENCOUNTER — HOME CARE VISIT (OUTPATIENT)
Facility: HOME HEALTH | Age: 79
End: 2024-05-06
Payer: MEDICARE

## 2024-05-06 VITALS
OXYGEN SATURATION: 99 % | SYSTOLIC BLOOD PRESSURE: 126 MMHG | RESPIRATION RATE: 16 BRPM | HEART RATE: 84 BPM | TEMPERATURE: 98.1 F | DIASTOLIC BLOOD PRESSURE: 88 MMHG

## 2024-05-06 PROCEDURE — G0157 HHC PT ASSISTANT EA 15: HCPCS

## 2024-05-06 ASSESSMENT — ENCOUNTER SYMPTOMS: PAIN LOCATION - PAIN QUALITY: ACHE

## 2024-05-06 NOTE — HOME HEALTH
Subjective:  Patient states that she just woke up.  Patient reports increased low back pain today.  Patient notes diligent f/u with HEP and Amb Program today.  Falls since last visit: NO  Caregiver involvement changes: NO  Home health supplies by type and quantity ordered/delivered this visit include: NO    Clinician asked if patient has had any physician contact since last home care visit and patient states: NO  Clinician asked if patient has any new or changed medications and patient states: NO  If Yes, were medications reconciled? N/A  Was the certifying physician notified of changes in medications? N/A    Clinical assessment (what this visit means for the patient overall and need for ongoing skilled care) and progress or lack of progress towards SPECIFIC goals:  Patient's gait pattern remains slightly kyphotic with uneven step lengths and frequent L foot drag with Rollator gait training in home today.  Patient remains limited in ambulation distance d/t fatigue.  Patient refused performance of balance ex's today d/t fatigue following gait and strength training.  Patient's significant low back pain; poor safety awareness with transfers and household ambulation; and compromised BLE strength and overall balance make patient a high risk for falls.    Written Teaching Material Utilized: Patient/CG received written/pictorial HEP for all sitting and supported standing therapeutic strength exercises performed today.    Interdisciplinary communication with:  N/A    Discharge planning as follows:  Discharge when all PT goals are met or maximum benefit achieved with PT.    Specific plan for next visit: Gait, transfer, strength, balance, pain management and fall prevention training as tolerated next session.

## 2024-05-07 ENCOUNTER — HOME CARE VISIT (OUTPATIENT)
Dept: HOME HEALTH SERVICES | Facility: HOME HEALTH | Age: 79
End: 2024-05-07
Payer: MEDICARE

## 2024-05-08 ENCOUNTER — HOME CARE VISIT (OUTPATIENT)
Facility: HOME HEALTH | Age: 79
End: 2024-05-08
Payer: MEDICARE

## 2024-05-08 VITALS
RESPIRATION RATE: 16 BRPM | TEMPERATURE: 97.9 F | HEART RATE: 64 BPM | OXYGEN SATURATION: 97 % | SYSTOLIC BLOOD PRESSURE: 129 MMHG | DIASTOLIC BLOOD PRESSURE: 77 MMHG

## 2024-05-08 PROCEDURE — G0157 HHC PT ASSISTANT EA 15: HCPCS

## 2024-05-08 ASSESSMENT — ENCOUNTER SYMPTOMS: PAIN LOCATION - PAIN QUALITY: ACHE

## 2024-05-08 NOTE — HOME HEALTH
Subjective:  Patient reports decreased overall pain and increased strength today.  Patient reports diligent f/u with HEP and Amb Program since last PT visit.  Falls since last visit: NO  Caregiver involvement changes: NO  Home health supplies by type and quantity ordered/delivered this visit include: NO    Clinician asked if patient has had any physician contact since last home care visit and patient states: NO  Clinician asked if patient has any new or changed medications and patient states: NO  If Yes, were medications reconciled? N/A  Was the certifying physician notified of changes in medications? N/A    Clinical assessment (what this visit means for the patient overall and need for ongoing skilled care) and progress or lack of progress towards SPECIFIC goals:  Patient was able to increase ambulation distance with Rollator gait training in home today.  Patient was able to improve gait pattern with cueing for posture, to take even step lengths and to avoid L foot drag today.  Patient's low back pain; poor safety awareness with household ambulation; and compromised BLE strength and overall balance make patient a high risk for falls.    Written Teaching Material Utilized: Patient/CG received written/pictorial HEP for all sitting and supported standing therapeutic strength exercises performed today.    Interdisciplinary communication with: N/A    Discharge planning as follows:  Discharge when all PT goals are met or maximum benefit achieved with PT.    Specific plan for next visit: Gait, transfer, strength, balance, pain management and fall prevention training as tolerated next session.

## 2024-05-09 ENCOUNTER — HOME CARE VISIT (OUTPATIENT)
Facility: HOME HEALTH | Age: 79
End: 2024-05-09
Payer: MEDICARE

## 2024-05-09 VITALS
DIASTOLIC BLOOD PRESSURE: 82 MMHG | HEART RATE: 79 BPM | TEMPERATURE: 97.4 F | OXYGEN SATURATION: 98 % | SYSTOLIC BLOOD PRESSURE: 130 MMHG | RESPIRATION RATE: 16 BRPM

## 2024-05-09 PROCEDURE — G0158 HHC OT ASSISTANT EA 15: HCPCS

## 2024-05-09 NOTE — HOME HEALTH
Subjective: can you look at the bench  Falls since last visit: None. (if yes complete the Fall Tracking Form and include bsrifallreport):   Caregiver involvement changes: None   Home health supplies by type and quantity ordered/delivered this visit include: N/A.   Clinician asked if patient has had any physician contact since last home care visit and patient states: No.   Clinician asked if patient has any new or changed medications and patient states: No.   If Yes, were medications reconciled? N/A.   Was the certifying physician notified of changes in medications? N/A.   Clinical assessment (what this visit means for the patient overall and need for ongoing skilled care) and progress or lack of progress towards SPECIFIC goals: Focus on today's OT session of TTB set up and transfer training for patient to increase comfort level to begin shower routine tomorrow with OTR. CGA for transfer. Will continue to require skilled OT services to address, shower, safety, and consistency with shower routine. Recommendation of leg  to assist with shower transfer.  Written Teaching Material Utilized: None this visit.   Interdisciplinary communication with: None this visit.   Discharge planning as follows: When goals met or max benefit achieved.   Specific plan for next visit: shower transfer training / ADL training.

## 2024-05-10 ENCOUNTER — HOME CARE VISIT (OUTPATIENT)
Facility: HOME HEALTH | Age: 79
End: 2024-05-10
Payer: MEDICARE

## 2024-05-10 PROCEDURE — G0152 HHCP-SERV OF OT,EA 15 MIN: HCPCS

## 2024-05-13 VITALS — SYSTOLIC BLOOD PRESSURE: 140 MMHG | TEMPERATURE: 98.4 F | HEART RATE: 76 BPM | DIASTOLIC BLOOD PRESSURE: 70 MMHG

## 2024-05-13 DIAGNOSIS — E03.9 HYPOTHYROIDISM, UNSPECIFIED TYPE: ICD-10-CM

## 2024-05-13 DIAGNOSIS — E78.5 HYPERLIPIDEMIA, UNSPECIFIED HYPERLIPIDEMIA TYPE: ICD-10-CM

## 2024-05-13 RX ORDER — LEVOTHYROXINE SODIUM 112 UG/1
112 TABLET ORAL
Qty: 90 TABLET | Refills: 1 | Status: SHIPPED | OUTPATIENT
Start: 2024-05-13

## 2024-05-13 RX ORDER — SIMVASTATIN 40 MG
40 TABLET ORAL EVERY EVENING
Qty: 90 TABLET | Refills: 1 | Status: SHIPPED | OUTPATIENT
Start: 2024-05-13

## 2024-05-13 ASSESSMENT — ENCOUNTER SYMPTOMS: PAIN LOCATION - PAIN QUALITY: ACHE, SORE

## 2024-05-13 NOTE — HOME HEALTH
Subjective:  \"That felt good, but now my back is hurting.\"   Pt. completed shower with O.T. this visit using new transfer tub bench.  Falls since last visit:   None.  (if yes complete the Fall Tracking Form and include bsrifallreport):   Caregiver involvement changes:  None.  Home health supplies by type and quantity ordered/delivered this visit include:  N/A.    Clinician asked if patient has had any physician contact since last home care visit and patient states:  No.  Clinician asked if patient has any new or changed medications and patient states:   No.  If Yes, were medications reconciled?  N/A.  Was the certifying physician notified of changes in medications?  N/A.    Clinical assessment (what this visit means for the patient overall and need for ongoing skilled care) and progress or lack of progress towards SPECIFIC goals:   O.T. re-instructed pt. on safe shower transfers using new transfer tub bench.   O.T. instructed on pacing during ADL routine and on safety recommendations.   Pt. completed shower transfer and bathing tasks requiring overall min. assist.   Pt. requires additional instruction on safe transfers and ADL training.   Pt. reported her daughter could help her shower over the weekend in preparation for Mother's Day.   O.T. provided written instructions as daughter not present for teaching.   Pt. will benefit from continued shower transfer training and ADL training to increase safety and independence.   See Interventions / Goals for additional details.    Written Teaching Material Utilized:  shower routine, safety recommendations.    Interdisciplinary communication with:  STEPHANIE communicated results of visit yesterday.    Discharge planning as follows:  When goals met or max benefit achieved.    Specific plan for next visit: shower transfer training, ADL training.

## 2024-05-14 ENCOUNTER — HOME CARE VISIT (OUTPATIENT)
Facility: HOME HEALTH | Age: 79
End: 2024-05-14
Payer: MEDICARE

## 2024-05-14 PROCEDURE — G0157 HHC PT ASSISTANT EA 15: HCPCS

## 2024-05-15 ENCOUNTER — HOME CARE VISIT (OUTPATIENT)
Facility: HOME HEALTH | Age: 79
End: 2024-05-15
Payer: MEDICARE

## 2024-05-15 VITALS
TEMPERATURE: 97.7 F | DIASTOLIC BLOOD PRESSURE: 73 MMHG | OXYGEN SATURATION: 98 % | SYSTOLIC BLOOD PRESSURE: 118 MMHG | RESPIRATION RATE: 16 BRPM | HEART RATE: 70 BPM

## 2024-05-15 PROCEDURE — G0152 HHCP-SERV OF OT,EA 15 MIN: HCPCS

## 2024-05-15 ASSESSMENT — ENCOUNTER SYMPTOMS: PAIN LOCATION - PAIN QUALITY: ACHE

## 2024-05-15 NOTE — HOME HEALTH
Subjective:  Patient states that she had a fall on Sunday (5/12/24) afternoon in her bedroom.  Patient reports that she was donning a bra while standing beside her bed w/o AD and lost her balance.  Patient states that she fell forward onto bed and then slid down on to floor.  Daughter called EMS who assisted her off of the floor.  Patient refused to go to ER and notes no serious injury.  Patient is complaining of significant R mid foot pain today.    Falls since last visit: Yes. See Fall report.  Caregiver involvement changes: NO  Home health supplies by type and quantity ordered/delivered this visit include: NO    Clinician asked if patient has had any physician contact since last home care visit and patient states: NO  Clinician asked if patient has any new or changed medications and patient states: NO  If Yes, were medications reconciled? N/A  Was the certifying physician notified of changes in medications? N/A    Clinical assessment (what this visit means for the patient overall and need for ongoing skilled care) and progress or lack of progress towards SPECIFIC goals:  Patient remains unable to perform therapeutic strength exercises w/o occasional prompting and cueing for technique and will require additional training.  Patient instructed to get dressed in a sitting position for safety and fall prevention.  Patient's poor safety awareness with ADL's; and compromised BLE strength and overall balance make patient a high risk for falls.    Written Teaching Material Utilized: Patient/CG received written/pictorial HEP for all sitting and supported standing therapeutic strength exercises performed today.    Interdisciplinary communication with:  OT, PT and NP re:  Fall    Discharge planning as follows:  Discharge when all PT goals are met or maximum benefit achieved with PT.    Specific plan for next visit: Gait, transfer, strength, balance, pain management and fall prevention training as tolerated next session.

## 2024-05-15 NOTE — CASE COMMUNICATION
Please complete form for all falls whether observed or not observed.    Fall observed by HH Staff?  No    Describe Event (please include location of fall and may copy and paste from visit note):  Patient states that she had a fall on Sunday (5/12/24) afternoon in her bedroom.  Patient reports that she was donning a bra while standing beside her bed w/o AD and lost her balance.  Patient states that she fell forward onto bed and then slid  down on to floor.  Daughter called EMS who assisted her off of the floor.  Patient refused to go to ER and notes no serious injury.  Patient is complaining of significant R mid foot pain today.    Document any re-training or treatment plan modifications indicated and the patient/caregiver response (may copy and paste from visit note):  Patient instructed to sit down when getting dressed for safety/fall prevention.    Assistive Devices  used by patient prior to fall:  Yes. Rollator.      Was this equipment in use at time of fall?  No    Injury? No.  If yes, describe:  Patient denies serious injury but c/o significant R mid foot pain today.  No significant edema at R foot today.    Emergent Care Received:  If yes, describe:  Yes.  EMS called and assisted patient off of floor.      Was patient identified as High Risk prior to fall? Yes

## 2024-05-16 ENCOUNTER — HOME CARE VISIT (OUTPATIENT)
Dept: HOME HEALTH SERVICES | Facility: HOME HEALTH | Age: 79
End: 2024-05-16
Payer: MEDICARE

## 2024-05-16 ENCOUNTER — HOME CARE VISIT (OUTPATIENT)
Facility: HOME HEALTH | Age: 79
End: 2024-05-16
Payer: MEDICARE

## 2024-05-16 VITALS
SYSTOLIC BLOOD PRESSURE: 120 MMHG | TEMPERATURE: 97.8 F | HEART RATE: 63 BPM | DIASTOLIC BLOOD PRESSURE: 80 MMHG | OXYGEN SATURATION: 98 %

## 2024-05-16 PROCEDURE — G0157 HHC PT ASSISTANT EA 15: HCPCS

## 2024-05-16 ASSESSMENT — ENCOUNTER SYMPTOMS: PAIN LOCATION - PAIN QUALITY: SORE

## 2024-05-16 NOTE — HOME HEALTH
Subjective:  \"I'll never wear a bra again.\"  Falls since last visit:   Yes.   PTA documented fall which occurred when pt. was attempting to don a bra on Mother's Day from a standing position.  (if yes complete the Fall Tracking Form and include bsrifallreport):   Caregiver involvement changes:  None.  Home health supplies by type and quantity ordered/delivered this visit include:  N/A.    Clinician asked if patient has had any physician contact since last home care visit and patient states:  No.  Clinician asked if patient has any new or changed medications and patient states:  No.  If Yes, were medications reconciled?  N/A.  Was the certifying physician notified of changes in medications?  N/A.    Clinical assessment (what this visit means for the patient overall and need for ongoing skilled care) and progress or lack of progress towards SPECIFIC goals:   Pt. declined the need for additonal shower transfer training / ADL training.   Pt. reports she got in the shower on Saturday with her daughter's assistance and all went well.   Pt. reports she went back to using her shower chair vs. the transfer tub bench.   Pt. demonstrated safe transfer this visit by using grab bars and clearing the shower threshold.   O.T. instructed pt. on the eventual need for the transfer tub bench when she will no longer be able to clear the threshold.   Pt. reports her daughter will continue to assist her as needed with showering.  O.T. instructed pt. on safety during ADL routine and discussed fall prevention.   Pt. in agreement with O.T. d/c today.   See Interventions / Goals for additional details.    Written Teaching Material Utilized: written d/c instructions.    Interdisciplinary communication with: O.T. will notify NP. P.T.  and PTA of O.T. d/c today.    Discharge planning as follows: O.T. d/c today.    Specific plan for next visit: N/A.

## 2024-05-17 VITALS
TEMPERATURE: 98.2 F | OXYGEN SATURATION: 97 % | SYSTOLIC BLOOD PRESSURE: 113 MMHG | RESPIRATION RATE: 16 BRPM | DIASTOLIC BLOOD PRESSURE: 76 MMHG | HEART RATE: 75 BPM

## 2024-05-18 ASSESSMENT — ENCOUNTER SYMPTOMS: PAIN LOCATION - PAIN QUALITY: ACHE

## 2024-05-18 NOTE — HOME HEALTH
Subjective:  Patient denies R foot pain with today's PT activities.  Patient reports limited f/u with HEP and Amb Program.  Falls since last visit: NO  Caregiver involvement changes: NO  Home health supplies by type and quantity ordered/delivered this visit include: NO    Clinician asked if patient has had any physician contact since last home care visit and patient states: NO  Clinician asked if patient has any new or changed medications and patient states: NO  If Yes, were medications reconciled? N/A  Was the certifying physician notified of changes in medications? N/A    Clinical assessment (what this visit means for the patient overall and need for ongoing skilled care) and progress or lack of progress towards SPECIFIC goals:  Patient demonstrates MI with sit <> stand transfers from lift chair in [facility] and recliner in [place] Room today.  Patient continues to drag L foot frequently with Rollator gait training d/t poor LLE strength.  Patient was unable to perform easy dynamic balance ex's again today d/t poor LLE strength.  Patient's history of falls, poor gair pattern with L foot drag; and compromised BLE strength and overall balance make patient a continued high risk for falls.    Written Teaching Material Utilized: Patient/CG received written/pictorial HEP for all sitting and supported standing therapeutic strength exercises performed today.    Interdisciplinary communication with:  N/A    Discharge planning as follows:  Discharge when all PT goals are met or maximum benefit achieved with PT.    Specific plan for next visit: Gait, transfer, strength, balance, pain management and fall prevention training as tolerated next session.

## 2024-05-20 ENCOUNTER — HOME CARE VISIT (OUTPATIENT)
Facility: HOME HEALTH | Age: 79
End: 2024-05-20
Payer: MEDICARE

## 2024-05-20 VITALS
DIASTOLIC BLOOD PRESSURE: 70 MMHG | TEMPERATURE: 97.8 F | SYSTOLIC BLOOD PRESSURE: 124 MMHG | RESPIRATION RATE: 16 BRPM | HEART RATE: 70 BPM | OXYGEN SATURATION: 97 %

## 2024-05-20 PROCEDURE — G0151 HHCP-SERV OF PT,EA 15 MIN: HCPCS

## 2024-05-20 ASSESSMENT — ENCOUNTER SYMPTOMS: PAIN LOCATION - PAIN QUALITY: ACHING,

## 2024-05-20 NOTE — HOME HEALTH
Subjective: patient reports she is seeing improvements but still has trouble picking up the left foot  Falls since last visit No(if yes complete the Fall Tracking Form and include bsrifallreport):   Caregiver involvement changes: none  Home health supplies by type and quantity ordered/delivered this visit include: none for PT    Clinician asked if patient has had any physician contact since last home care visit and patient states: NO  Clinician asked if patient has any new or changed medications and patient states:  NO    If Yes, were medications reconciled? N/A    Was the certifying physician notified of changes in medications? N/A      Clinical assessment (what this visit means for the patient overall and need for ongoing skilled care) and progress or lack of progress towards SPECIFIC goals: Patient has been seen x 8 PT visits with a focus on strengthening, gait and balance training to increase safety in home environment. She is demonstrating improvements in strength as she is able to perform transfers with less assistance. Balance deficits in standing continue to persist as well as decreased left LE strength that impact safe foot clearance for ambulation. She remains a fall risk as noted by score of 14/28 on the tinetti. She remains motivated to participate in therapy and would benefit from continued PT for strengthening, gait, and balance training to decrease fall risk with ambulation.    Discussed being fit for left foot AFO but patient does not want to do this-had in the past and was hard to don/doff-discussed that if foot clearance doesn't improve, this may need to be revisisted.    Written Teaching Material Utilized: updated written HEP    Interdisciplinary communication with:   LPTA and   OT for the purpose of POC collaboration and fall risk concerns    Discharge planning as follows: Will discharge when the patient has reached their maximum functional potential and maximum safety in their home    Specific

## 2024-05-23 ENCOUNTER — HOME CARE VISIT (OUTPATIENT)
Facility: HOME HEALTH | Age: 79
End: 2024-05-23
Payer: MEDICARE

## 2024-05-23 PROCEDURE — G0157 HHC PT ASSISTANT EA 15: HCPCS

## 2024-05-24 VITALS
RESPIRATION RATE: 16 BRPM | DIASTOLIC BLOOD PRESSURE: 75 MMHG | TEMPERATURE: 98.1 F | OXYGEN SATURATION: 98 % | SYSTOLIC BLOOD PRESSURE: 116 MMHG | HEART RATE: 69 BPM

## 2024-05-27 NOTE — HOME HEALTH
Subjective:  Patient denies pain at rest but c/o significant low back pain with performance of today's PT activities.  Patient notes diligent f/u with HEP and Amb Program since last PT visit.  Falls since last visit: NO  Caregiver involvement changes: NO  Home health supplies by type and quantity ordered/delivered this visit include: NO    Clinician asked if patient has had any physician contact since last home care visit and patient states: NO  Clinician asked if patient has any new or changed medications and patient states: NO  If Yes, were medications reconciled? N/A  Was the certifying physician notified of changes in medications? N/A    Clinical assessment (what this visit means for the patient overall and need for ongoing skilled care) and progress or lack of progress towards SPECIFIC goals:  Patient remains unable to perform any dynamic balance ex's d/t LLE weakness.  Patient's gait pattern remains slightly kyphotic with decreased LLE step length and L foot drag.  Patient's history of falls; poor RW gait pattern with L foot drag; and compromised BLE strength and overall balance make patient a high risk for falls.    Written Teaching Material Utilized: Patient/CG received written/pictorial HEP for all sitting and supported standing therapeutic strength exercises and balance exercises performed today.    Interdisciplinary communication with:  SIMRAN Henning re:  PT POC    Discharge planning as follows:  Discharge when all PT goals are met or maximum benefit achieved with PT.    Specific plan for next visit: Gait, transfer, strength, balance, pain management and fall prevention training as tolerated next session.

## 2024-05-28 ENCOUNTER — HOME CARE VISIT (OUTPATIENT)
Facility: HOME HEALTH | Age: 79
End: 2024-05-28
Payer: MEDICARE

## 2024-05-28 PROCEDURE — G0157 HHC PT ASSISTANT EA 15: HCPCS

## 2024-05-30 ENCOUNTER — HOME CARE VISIT (OUTPATIENT)
Facility: HOME HEALTH | Age: 79
End: 2024-05-30
Payer: MEDICARE

## 2024-05-30 VITALS
DIASTOLIC BLOOD PRESSURE: 79 MMHG | RESPIRATION RATE: 16 BRPM | TEMPERATURE: 97.5 F | OXYGEN SATURATION: 99 % | SYSTOLIC BLOOD PRESSURE: 145 MMHG | HEART RATE: 62 BPM

## 2024-05-30 PROCEDURE — G0157 HHC PT ASSISTANT EA 15: HCPCS

## 2024-05-30 ASSESSMENT — ENCOUNTER SYMPTOMS: PAIN LOCATION - PAIN QUALITY: ACHE

## 2024-05-30 NOTE — HOME HEALTH
Subjective:  Patient denies pain at rest and notes only mild low back pain with today's standing PT activities.  Patient reports diligent f/u with HEP and limited f/u with Amb Program since last PT visit.  Falls since last visit: NO  Caregiver involvement changes: NO  Home health supplies by type and quantity ordered/delivered this visit include: NO    Clinician asked if patient has had any physician contact since last home care visit and patient states: NO  Clinician asked if patient has any new or changed medications and patient states: NO  If Yes, were medications reconciled? N/A  Was the certifying physician notified of changes in medications? N/A    Clinical assessment (what this visit means for the patient overall and need for ongoing skilled care) and progress or lack of progress towards SPECIFIC goals:  Patient demonstrates improved gait pattern with Rollator gait training today with decreased L foot drag and even step lengths.  Patient remains unable to perform any dynamic balance ex's d/t LLE weakness again today.  Patient's history of falls; poor gait pattern with intermittent L foot drag; and compromised BLE strength and overall balance make patient a high risk for falls.    Written Teaching Material Utilized: Patient/CG received written/pictorial HEP for all sitting and supported standing therapeutic strength exercises and balance exercises performed today.    Interdisciplinary communication with:  N/A    Discharge planning as follows:  Discharge when all PT goals are met or maximum benefit achieved with PT.    Specific plan for next visit: Gait, transfer, strength, balance, pain management and fall prevention training as tolerated next session.

## 2024-05-31 VITALS
SYSTOLIC BLOOD PRESSURE: 131 MMHG | HEART RATE: 65 BPM | DIASTOLIC BLOOD PRESSURE: 73 MMHG | RESPIRATION RATE: 16 BRPM | OXYGEN SATURATION: 98 % | TEMPERATURE: 97.9 F

## 2024-06-03 ENCOUNTER — HOME CARE VISIT (OUTPATIENT)
Facility: HOME HEALTH | Age: 79
End: 2024-06-03
Payer: MEDICARE

## 2024-06-03 VITALS
DIASTOLIC BLOOD PRESSURE: 71 MMHG | TEMPERATURE: 97.7 F | HEART RATE: 63 BPM | RESPIRATION RATE: 16 BRPM | SYSTOLIC BLOOD PRESSURE: 126 MMHG | OXYGEN SATURATION: 95 %

## 2024-06-03 PROCEDURE — G0157 HHC PT ASSISTANT EA 15: HCPCS

## 2024-06-05 ENCOUNTER — HOME CARE VISIT (OUTPATIENT)
Facility: HOME HEALTH | Age: 79
End: 2024-06-05
Payer: MEDICARE

## 2024-06-05 ENCOUNTER — HOME CARE VISIT (OUTPATIENT)
Dept: HOME HEALTH SERVICES | Facility: HOME HEALTH | Age: 79
End: 2024-06-05
Payer: MEDICARE

## 2024-06-05 VITALS
SYSTOLIC BLOOD PRESSURE: 128 MMHG | OXYGEN SATURATION: 98 % | HEART RATE: 70 BPM | TEMPERATURE: 98.1 F | DIASTOLIC BLOOD PRESSURE: 70 MMHG | RESPIRATION RATE: 16 BRPM

## 2024-06-05 PROCEDURE — G0151 HHCP-SERV OF PT,EA 15 MIN: HCPCS

## 2024-06-05 ASSESSMENT — ENCOUNTER SYMPTOMS: PAIN LOCATION - PAIN QUALITY: ACHING, THROBBING

## 2024-06-05 NOTE — HOME HEALTH
Subjective: patient reports feeling stronger and walking better, has been doing her exercises  Falls since last visit No(if yes complete the Fall Tracking Form and include bsrifallreport):   Caregiver involvement changes: no changes  Home health supplies by type and quantity ordered/delivered this visit include: none    Clinician asked if patient has had any physician contact since last home care visit and patient states: NO  Clinician asked if patient has any new or changed medications and patient states:  NO    If Yes, were medications reconciled? YES  for DC purposes  Was the certifying physician notified of changes in medications? N/A      Clinical assessment (what this visit means for the patient overall and need for ongoing skilled care) and progress or lack of progress towards SPECIFIC goals: DC completed this visit, see DC instructions section. Instructed patient in HEP and is performing as instructed. Patient demonstrates safe gait technique on even surfaces in home and with transfers. Family available to assist patient when leaving home via wheelchair.     Written Teaching Material Utilized: HEP, DC instructions    Interdisciplinary communication with:   ESPERANZATA for the purpose of POC collaboration and DC plans

## 2024-06-05 NOTE — HOME HEALTH
Subjective:  Patient denies pain of any kind today.  \"I'm pleased with the progress I have made.\"  Falls since last visit: NO  Caregiver involvement changes: NO  Home health supplies by type and quantity ordered/delivered this visit include: NO    Clinician asked if patient has had any physician contact since last home care visit and patient states: NO  Clinician asked if patient has any new or changed medications and patient states: NO  If Yes, were medications reconciled? N/A  Was the certifying physician notified of changes in medications? N/A    Clinical assessment (what this visit means for the patient overall and need for ongoing skilled care) and progress or lack of progress towards SPECIFIC goals:  Patient demonstrates improved gait pattern but continues to require occasional cueing for posture/Rollator placement, to take even step lengths and to avoid L foot drag with Rollator gait training today.  Patient demonstrates MI with performance of HEP today.  Maximum benefit achieved with PT.  Discharge PT next session.    Written Teaching Material Utilized: Patient/CG received written/pictorial HEP for all sitting and supported standing therapeutic strength exercises performed today.    Interdisciplinary communication with:  SIMRAN Henning re:  Patient progress and discharge planning.    Discharge planning as follows:  Agency Discharge next session.    Specific plan for next visit:  Agency Discharge next session.

## 2024-06-29 ENCOUNTER — APPOINTMENT (OUTPATIENT)
Facility: HOSPITAL | Age: 79
End: 2024-06-29
Payer: MEDICARE

## 2024-06-29 ENCOUNTER — HOSPITAL ENCOUNTER (EMERGENCY)
Facility: HOSPITAL | Age: 79
Discharge: HOME | End: 2024-06-29
Attending: EMERGENCY MEDICINE
Payer: MEDICARE

## 2024-06-29 VITALS
HEIGHT: 63 IN | DIASTOLIC BLOOD PRESSURE: 58 MMHG | SYSTOLIC BLOOD PRESSURE: 121 MMHG | WEIGHT: 192.68 LBS | TEMPERATURE: 97.2 F | OXYGEN SATURATION: 95 % | HEART RATE: 59 BPM | BODY MASS INDEX: 34.14 KG/M2 | RESPIRATION RATE: 16 BRPM
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
## 2024-06-29 PROCEDURE — 72125 CT NECK SPINE W/O DYE: CPT

## 2024-06-29 PROCEDURE — 70450 CT HEAD/BRAIN W/O DYE: CPT

## 2024-06-29 PROCEDURE — 6370000000 HC RX 637 (ALT 250 FOR IP): Performed by: EMERGENCY MEDICINE

## 2024-06-29 PROCEDURE — 99284 EMERGENCY DEPT VISIT MOD MDM: CPT

## 2024-06-29 RX ORDER — ACETAMINOPHEN 325 MG/1
650 TABLET ORAL
Status: COMPLETED | OUTPATIENT
Start: 2024-06-29 | End: 2024-06-29

## 2024-06-29 RX ADMIN — ACETAMINOPHEN 650 MG: 325 TABLET ORAL at 20:56

## 2024-06-29 ASSESSMENT — PAIN - FUNCTIONAL ASSESSMENT
PAIN_FUNCTIONAL_ASSESSMENT: ACTIVITIES ARE NOT PREVENTED
PAIN_FUNCTIONAL_ASSESSMENT: 0-10
PAIN_FUNCTIONAL_ASSESSMENT: ACTIVITIES ARE NOT PREVENTED

## 2024-06-29 ASSESSMENT — PAIN DESCRIPTION - LOCATION
LOCATION: HEAD
LOCATION: HEAD

## 2024-06-29 ASSESSMENT — PAIN DESCRIPTION - ONSET: ONSET: ON-GOING

## 2024-06-29 ASSESSMENT — LIFESTYLE VARIABLES
HOW MANY STANDARD DRINKS CONTAINING ALCOHOL DO YOU HAVE ON A TYPICAL DAY: 1 OR 2
HOW OFTEN DO YOU HAVE A DRINK CONTAINING ALCOHOL: MONTHLY OR LESS

## 2024-06-29 ASSESSMENT — PAIN DESCRIPTION - DESCRIPTORS
DESCRIPTORS: ACHING
DESCRIPTORS: ACHING

## 2024-06-29 ASSESSMENT — PAIN SCALES - GENERAL
PAINLEVEL_OUTOF10: 1
PAINLEVEL_OUTOF10: 2

## 2024-06-29 ASSESSMENT — PAIN DESCRIPTION - FREQUENCY: FREQUENCY: CONTINUOUS

## 2024-06-29 ASSESSMENT — PAIN DESCRIPTION - PAIN TYPE: TYPE: ACUTE PAIN

## 2024-06-29 NOTE — ED TRIAGE NOTES
Patient had an unwitnessed fall at home this evening. Pt relates that she was using her walker and turned too fast, became dizzy and fell, hitting her head. No loc or lacerations. Pt c/o of a slight headache. EMS relate that the patient's daughter wanted the patient to come to the ER to be evaluated.

## 2024-06-30 NOTE — ED NOTES
Reviewed discharge instructions with the patient and her daughter, highlighting home care, the importance of medical follow-up, fall prevention tips and signs and symptoms requiring more immediate medical attention. Pt and her daughter verbalize understanding of the instructions given. Pt transfers from wheelchair into POV with one person assist which is her baseline her the patient and her daughter. Pt tolerating PO food/fluids at d/c.   Coronary artery disease involving native coronary artery of native heart with unstable angina pector

## 2024-06-30 NOTE — ED PROVIDER NOTES
Smokeless tobacco: Never   Substance and Sexual Activity    Alcohol use: Not Currently    Drug use: Yes     Types: Marijuana (Weed)   Social History Narrative    Lives with daughter     Social Determinants of Health     Financial Resource Strain: Low Risk  (7/20/2023)    Overall Financial Resource Strain (CARDIA)     Difficulty of Paying Living Expenses: Not hard at all   Transportation Needs: No Transportation Needs (6/5/2024)    OASIS : Transportation     Lack of Transportation (Medical): No     Lack of Transportation (Non-Medical): No     Patient Unable or Declines to Respond: No   Social Connections: Feeling Socially Integrated (6/5/2024)    OASIS : Social Isolation     Frequency of experiencing loneliness or isolation: Never   Housing Stability: Unknown (7/20/2023)    Housing Stability Vital Sign     Unstable Housing in the Last Year: No         PHYSICAL EXAM       ED Triage Vitals   BP Temp Temp src Pulse Resp SpO2 Height Weight   -- -- -- -- -- -- -- --       There is no height or weight on file to calculate BMI.    Physical Exam  Vitals and nursing note reviewed.   Constitutional:       Appearance: Normal appearance.   HENT:      Head: Normocephalic and atraumatic.   Musculoskeletal:      Cervical back: No rigidity or tenderness.   Neurological:      General: No focal deficit present.      Mental Status: She is alert and oriented to person, place, and time.             EMERGENCY DEPARTMENT COURSE and DIFFERENTIAL DIAGNOSIS/MDM:   Vitals:  There were no vitals filed for this visit.      Medical Decision Making  79-year-old female presents to the emergency department above after a ground-level fall.  She has no complaints to me.  Given her age CT scanning obtained of the head and C-spine which are reassuring.  Will discharge back to her facility    Amount and/or Complexity of Data Reviewed  External Data Reviewed: notes.  Radiology: ordered and independent interpretation performed. Decision-making

## 2024-07-01 DIAGNOSIS — F33.0 MILD EPISODE OF RECURRENT MAJOR DEPRESSIVE DISORDER (HCC): ICD-10-CM

## 2024-07-01 RX ORDER — BUPROPION HYDROCHLORIDE 150 MG/1
150 TABLET ORAL EVERY MORNING
Qty: 90 TABLET | Refills: 1 | Status: SHIPPED | OUTPATIENT
Start: 2024-07-01

## 2024-07-03 ENCOUNTER — TELEPHONE (OUTPATIENT)
Facility: CLINIC | Age: 79
End: 2024-07-03

## 2024-07-03 NOTE — TELEPHONE ENCOUNTER
Elena called from [facility] to update Tiffanie Roberts NP that the patient's right shoulder xray for future injections was accidentally cancelled.  Her callback if needed is [phone].

## 2024-07-10 ENCOUNTER — TELEPHONE (OUTPATIENT)
Facility: CLINIC | Age: 79
End: 2024-07-10
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
## 2024-07-23 ENCOUNTER — TELEPHONE (OUTPATIENT)
Facility: CLINIC | Age: 79
End: 2024-07-23

## 2024-08-08 ENCOUNTER — TELEPHONE (OUTPATIENT)
Facility: CLINIC | Age: 79
End: 2024-08-08

## 2024-08-08 NOTE — TELEPHONE ENCOUNTER
Called patient to confirm their in home visit with Dr. Lane on 8/15/2024, arrival between 9-1.  Spoke with dgtr, they confirmed the appointment and answered the covid screen questions. Does anyone in the household have covid no  Have there been any changes to insurance no or location no

## 2024-08-14 DIAGNOSIS — G35 MULTIPLE SCLEROSIS (HCC): ICD-10-CM

## 2024-08-14 RX ORDER — PREDNISONE 5 MG/1
5 TABLET ORAL DAILY
Qty: 90 TABLET | Refills: 1 | Status: SHIPPED | OUTPATIENT
Start: 2024-08-14

## 2024-08-15 ENCOUNTER — OFFICE VISIT (OUTPATIENT)
Facility: CLINIC | Age: 79
End: 2024-08-15
Payer: MEDICARE

## 2024-08-15 DIAGNOSIS — G25.81 RLS (RESTLESS LEGS SYNDROME): ICD-10-CM

## 2024-08-15 DIAGNOSIS — J44.9 CHRONIC OBSTRUCTIVE PULMONARY DISEASE, UNSPECIFIED COPD TYPE (HCC): ICD-10-CM

## 2024-08-15 DIAGNOSIS — M81.0 AGE-RELATED OSTEOPOROSIS WITHOUT CURRENT PATHOLOGICAL FRACTURE: ICD-10-CM

## 2024-08-15 DIAGNOSIS — N39.3 URINE, INCONTINENCE, STRESS FEMALE: ICD-10-CM

## 2024-08-15 DIAGNOSIS — E78.5 HYPERLIPIDEMIA, UNSPECIFIED HYPERLIPIDEMIA TYPE: ICD-10-CM

## 2024-08-15 DIAGNOSIS — G89.29 OTHER CHRONIC PAIN: ICD-10-CM

## 2024-08-15 DIAGNOSIS — G35 MULTIPLE SCLEROSIS (HCC): Primary | ICD-10-CM

## 2024-08-15 DIAGNOSIS — M54.41 CHRONIC BILATERAL LOW BACK PAIN WITH RIGHT-SIDED SCIATICA: ICD-10-CM

## 2024-08-15 DIAGNOSIS — G89.29 CHRONIC BILATERAL LOW BACK PAIN WITH RIGHT-SIDED SCIATICA: ICD-10-CM

## 2024-08-15 DIAGNOSIS — E03.9 HYPOTHYROIDISM, UNSPECIFIED TYPE: ICD-10-CM

## 2024-08-15 DIAGNOSIS — I10 PRIMARY HYPERTENSION: ICD-10-CM

## 2024-08-15 DIAGNOSIS — F33.0 MILD EPISODE OF RECURRENT MAJOR DEPRESSIVE DISORDER (HCC): ICD-10-CM

## 2024-08-15 PROCEDURE — 99350 HOME/RES VST EST HIGH MDM 60: CPT | Performed by: FAMILY MEDICINE

## 2024-08-15 PROCEDURE — 0509F URINE INCON PLAN DOCD: CPT | Performed by: FAMILY MEDICINE

## 2024-08-15 PROCEDURE — 1090F PRES/ABSN URINE INCON ASSESS: CPT | Performed by: FAMILY MEDICINE

## 2024-08-15 PROCEDURE — 3077F SYST BP >= 140 MM HG: CPT | Performed by: FAMILY MEDICINE

## 2024-08-15 PROCEDURE — G0318 PR PROLONG HOME EVAL ADD 15M: HCPCS | Performed by: FAMILY MEDICINE

## 2024-08-15 PROCEDURE — 1036F TOBACCO NON-USER: CPT | Performed by: FAMILY MEDICINE

## 2024-08-15 PROCEDURE — G8417 CALC BMI ABV UP PARAM F/U: HCPCS | Performed by: FAMILY MEDICINE

## 2024-08-15 PROCEDURE — 3078F DIAST BP <80 MM HG: CPT | Performed by: FAMILY MEDICINE

## 2024-08-15 PROCEDURE — 1123F ACP DISCUSS/DSCN MKR DOCD: CPT | Performed by: FAMILY MEDICINE

## 2024-08-15 NOTE — PROGRESS NOTES
Vitamins-Minerals (OCUVITE ADULT 50+ PO) Take 1 tablet by mouth daily      Omega-3 Fatty Acids (FISH OIL) 1000 MG capsule Take 1 capsule by mouth daily      Lactobacillus (DIGESTIVE HEALTH PROBIOTIC) CAPS Take 1 capsule by mouth daily 100 million daily      diclofenac sodium (VOLTAREN) 1 % GEL Apply 4 g topically 4 times daily as needed for Pain 350 g 1    gabapentin (NEURONTIN) 100 MG capsule Take 1 capsule by mouth nightly for 90 days. Max Daily Amount: 100 mg 90 capsule 0    nystatin (MYCOSTATIN) 416862 UNIT/GM powder Apply 3 times daily. (Patient taking differently: Apply 3 times daily  to skin fold areas such as between legs, under stomach folds) 60 g 1    brinzolamide (AZOPT) 1 % ophthalmic suspension Place 1 drop into both eyes 2 times daily      cyanocobalamin 1000 MCG tablet Take 1 tablet by mouth daily      cycloSPORINE (RESTASIS) 0.05 % ophthalmic emulsion Place 1 drop into both eyes 2 times daily      latanoprost (XALATAN) 0.005 % ophthalmic solution Place 1 drop into both eyes daily      timolol (TIMOPTIC) 0.5 % ophthalmic solution INSTILL 1 DROP INTO INTO BOTH EYES TWICE A DAY       No current facility-administered medications on file prior to visit.     Review of Systems   Constitutional:  Negative for chills and fever.   HENT:  Negative for congestion, rhinorrhea and sore throat.    Eyes:  Negative for pain and discharge.   Respiratory:  Negative for cough, shortness of breath and wheezing.    Cardiovascular:  Negative for chest pain and leg swelling.   Gastrointestinal:  Negative for abdominal pain, constipation and diarrhea.   Genitourinary:  Negative for dysuria and frequency.   Musculoskeletal:  Negative for joint swelling and neck pain.        Chronic pain d/t MS.   Skin:  Negative for rash and wound.   Neurological:  Negative for dizziness and headaches.   Psychiatric/Behavioral:  Negative for sleep disturbance. The patient is not nervous/anxious.      BP (!) 143/63   Pulse 62   Resp 16

## 2024-08-20 ENCOUNTER — CLINICAL DOCUMENTATION (OUTPATIENT)
Facility: CLINIC | Age: 79
End: 2024-08-20

## 2024-08-21 VITALS
OXYGEN SATURATION: 98 % | HEART RATE: 62 BPM | RESPIRATION RATE: 16 BRPM | DIASTOLIC BLOOD PRESSURE: 63 MMHG | SYSTOLIC BLOOD PRESSURE: 143 MMHG

## 2024-08-21 PROBLEM — G89.29 OTHER CHRONIC PAIN: Status: ACTIVE | Noted: 2024-08-21

## 2024-08-21 ASSESSMENT — ENCOUNTER SYMPTOMS
WHEEZING: 0
COUGH: 0
CONSTIPATION: 0
EYE DISCHARGE: 0
SHORTNESS OF BREATH: 0
ABDOMINAL PAIN: 0
EYE PAIN: 0
DIARRHEA: 0
SORE THROAT: 0
RHINORRHEA: 0

## 2024-08-21 NOTE — PROGRESS NOTES
Patient care discussed during Primary Care at Home IDT meeting on Tuesday, 8/21. Reviewed general details of family disagreement during my last visit. Discussed there are chronic challenges in the relationship between patient and her daughter. Patient told me during my visit that she is safe at home and she wants her daughter to continue to live with her. Our team LCSW will plan follow up call with patient for support and clarification of mPOA as patient reported she is changing her mPOA. We will need to inquire if patient wishes for us to continue discussing her medical issues with her daughter Javon. If Javon is continuing in her role as primary caregiver and manager of patient's medications, then ideally we would be able to communicate with her about changes in patient's medical condition and medications.

## 2024-08-23 ENCOUNTER — TELEPHONE (OUTPATIENT)
Facility: CLINIC | Age: 79
End: 2024-08-23

## 2024-08-23 NOTE — TELEPHONE ENCOUNTER
I called patient this afternoon to follow up regarding her preferences for her chronic pain management. Encouraged her to return my call so we can discuss in more detail.
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
## 2024-09-04 ENCOUNTER — TELEPHONE (OUTPATIENT)
Facility: CLINIC | Age: 79
End: 2024-09-04

## 2024-09-04 NOTE — TELEPHONE ENCOUNTER
Javon is the patient's daughter.  Her access to both the patient's [employer] and Epic account's was recently revoked per the patient's request.      Javon called today stating that she is her mothers caregiver and takes care of her appointments too.  She said that she can't acceess any of the patient's information, that her name has been removed.  I let her know that whatever changes were made were made at the request of the patient.      Javon expressed being upset and repeated again that she is the patient's caregiver.  I let Javon know that there was nothing further that we could do for her, but that I would send a message to the clinical team to make them aware of her situation.  She expressed confusion and frustration but acknowledged.    Her callback if needed is [phone]

## 2024-09-06 ENCOUNTER — TELEPHONE (OUTPATIENT)
Facility: CLINIC | Age: 79
End: 2024-09-06

## 2024-09-06 NOTE — TELEPHONE ENCOUNTER
The patient called and stated that she was sorry to be so late returning Dr. Lane call.  She states that she has been out of town, and yes she would like to increase her pain medication.    The patient also said that she has a POA to give to Dr. Lane at her next visit, and states that she has three people listed on the document that are authorized to access her [employer] account:    Nash Taylor - mayte Taylor - mayte    The patient's callback is [phone]

## 2024-09-10 DIAGNOSIS — G47.00 INSOMNIA, UNSPECIFIED TYPE: ICD-10-CM

## 2024-09-10 RX ORDER — QUETIAPINE FUMARATE 25 MG/1
25 TABLET, FILM COATED ORAL NIGHTLY
Qty: 90 TABLET | Refills: 1 | Status: SHIPPED | OUTPATIENT
Start: 2024-09-10

## 2024-09-16 DIAGNOSIS — E03.9 HYPOTHYROIDISM, UNSPECIFIED TYPE: ICD-10-CM

## 2024-09-16 DIAGNOSIS — E66.01 SEVERE OBESITY (BMI 35.0-39.9) WITH COMORBIDITY (HCC): ICD-10-CM

## 2024-09-16 DIAGNOSIS — I10 PRIMARY HYPERTENSION: ICD-10-CM

## 2024-09-16 DIAGNOSIS — E78.5 HYPERLIPIDEMIA, UNSPECIFIED HYPERLIPIDEMIA TYPE: Primary | ICD-10-CM

## 2024-09-18 ENCOUNTER — NURSE ONLY (OUTPATIENT)
Facility: CLINIC | Age: 79
End: 2024-09-18

## 2024-09-18 DIAGNOSIS — E66.01 SEVERE OBESITY (BMI 35.0-39.9) WITH COMORBIDITY: ICD-10-CM

## 2024-09-18 DIAGNOSIS — E03.9 HYPOTHYROIDISM, UNSPECIFIED TYPE: ICD-10-CM

## 2024-09-18 DIAGNOSIS — E78.5 HYPERLIPIDEMIA, UNSPECIFIED HYPERLIPIDEMIA TYPE: ICD-10-CM

## 2024-09-18 DIAGNOSIS — I10 PRIMARY HYPERTENSION: Primary | ICD-10-CM

## 2024-09-18 DIAGNOSIS — I10 PRIMARY HYPERTENSION: ICD-10-CM

## 2024-09-18 LAB
ANION GAP SERPL CALC-SCNC: 4 MMOL/L (ref 2–12)
BASOPHILS # BLD: 0.1 K/UL (ref 0–0.1)
BASOPHILS NFR BLD: 1 % (ref 0–1)
BUN SERPL-MCNC: 19 MG/DL (ref 6–20)
BUN/CREAT SERPL: 27 (ref 12–20)
CALCIUM SERPL-MCNC: 9.2 MG/DL (ref 8.5–10.1)
CHLORIDE SERPL-SCNC: 110 MMOL/L (ref 97–108)
CHOLEST SERPL-MCNC: 176 MG/DL
CO2 SERPL-SCNC: 28 MMOL/L (ref 21–32)
CREAT SERPL-MCNC: 0.71 MG/DL (ref 0.55–1.02)
DIFFERENTIAL METHOD BLD: NORMAL
EOSINOPHIL # BLD: 0.3 K/UL (ref 0–0.4)
EOSINOPHIL NFR BLD: 4 % (ref 0–7)
ERYTHROCYTE [DISTWIDTH] IN BLOOD BY AUTOMATED COUNT: 13.8 % (ref 11.5–14.5)
GLUCOSE SERPL-MCNC: 101 MG/DL (ref 65–100)
HCT VFR BLD AUTO: 43.5 % (ref 35–47)
HDLC SERPL-MCNC: 78 MG/DL
HDLC SERPL: 2.3 (ref 0–5)
HGB BLD-MCNC: 13.9 G/DL (ref 11.5–16)
IMM GRANULOCYTES # BLD AUTO: 0 K/UL (ref 0–0.04)
IMM GRANULOCYTES NFR BLD AUTO: 0 % (ref 0–0.5)
LDLC SERPL CALC-MCNC: 69.2 MG/DL (ref 0–100)
LYMPHOCYTES # BLD: 2.2 K/UL (ref 0.8–3.5)
LYMPHOCYTES NFR BLD: 26 % (ref 12–49)
MCH RBC QN AUTO: 30.6 PG (ref 26–34)
MCHC RBC AUTO-ENTMCNC: 32 G/DL (ref 30–36.5)
MCV RBC AUTO: 95.8 FL (ref 80–99)
MONOCYTES # BLD: 1 K/UL (ref 0–1)
MONOCYTES NFR BLD: 12 % (ref 5–13)
NEUTS SEG # BLD: 4.8 K/UL (ref 1.8–8)
NEUTS SEG NFR BLD: 57 % (ref 32–75)
NRBC # BLD: 0 K/UL (ref 0–0.01)
NRBC BLD-RTO: 0 PER 100 WBC
PLATELET # BLD AUTO: 244 K/UL (ref 150–400)
PMV BLD AUTO: 11.2 FL (ref 8.9–12.9)
POTASSIUM SERPL-SCNC: 4.2 MMOL/L (ref 3.5–5.1)
RBC # BLD AUTO: 4.54 M/UL (ref 3.8–5.2)
SODIUM SERPL-SCNC: 142 MMOL/L (ref 136–145)
TRIGL SERPL-MCNC: 144 MG/DL
TSH SERPL DL<=0.05 MIU/L-ACNC: 1.42 UIU/ML (ref 0.36–3.74)
VLDLC SERPL CALC-MCNC: 28.8 MG/DL
WBC # BLD AUTO: 8.4 K/UL (ref 3.6–11)

## 2024-10-25 ENCOUNTER — TELEPHONE (OUTPATIENT)
Facility: CLINIC | Age: 79
End: 2024-10-25
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
## 2024-10-25 NOTE — TELEPHONE ENCOUNTER
LCSW rec'd referral from [facility] MD, stating that pt was interested in counseling resources. LCSW called pt, and scheduled an in-home appointment to visit with her on 11/7/24 @ 1:30pm. LCSW to assess her request and provide support and resources as appropriate.     AMIE Dutta, LCSW     [facility]  Primary Care at [facility] Building   [address], [address]   [place] [address]  C) [phone]  (O) [phone]  [email or web address]

## 2024-10-31 ENCOUNTER — OFFICE VISIT (OUTPATIENT)
Facility: CLINIC | Age: 79
End: 2024-10-31

## 2024-10-31 VITALS
SYSTOLIC BLOOD PRESSURE: 140 MMHG | DIASTOLIC BLOOD PRESSURE: 74 MMHG | HEART RATE: 58 BPM | OXYGEN SATURATION: 100 % | RESPIRATION RATE: 16 BRPM

## 2024-10-31 DIAGNOSIS — E78.5 HYPERLIPIDEMIA, UNSPECIFIED HYPERLIPIDEMIA TYPE: ICD-10-CM

## 2024-10-31 DIAGNOSIS — M81.0 AGE-RELATED OSTEOPOROSIS WITHOUT CURRENT PATHOLOGICAL FRACTURE: ICD-10-CM

## 2024-10-31 DIAGNOSIS — G25.81 RLS (RESTLESS LEGS SYNDROME): ICD-10-CM

## 2024-10-31 DIAGNOSIS — M54.41 CHRONIC BILATERAL LOW BACK PAIN WITH RIGHT-SIDED SCIATICA: ICD-10-CM

## 2024-10-31 DIAGNOSIS — G89.29 CHRONIC BILATERAL LOW BACK PAIN WITH RIGHT-SIDED SCIATICA: ICD-10-CM

## 2024-10-31 DIAGNOSIS — J44.9 CHRONIC OBSTRUCTIVE PULMONARY DISEASE, UNSPECIFIED COPD TYPE (HCC): ICD-10-CM

## 2024-10-31 DIAGNOSIS — N39.3 URINE, INCONTINENCE, STRESS FEMALE: ICD-10-CM

## 2024-10-31 DIAGNOSIS — I10 PRIMARY HYPERTENSION: ICD-10-CM

## 2024-10-31 DIAGNOSIS — G35 MULTIPLE SCLEROSIS (HCC): Primary | ICD-10-CM

## 2024-10-31 DIAGNOSIS — E03.9 HYPOTHYROIDISM, UNSPECIFIED TYPE: ICD-10-CM

## 2024-10-31 DIAGNOSIS — F33.0 MILD EPISODE OF RECURRENT MAJOR DEPRESSIVE DISORDER (HCC): ICD-10-CM

## 2024-10-31 NOTE — PROGRESS NOTES
Negative for congestion, rhinorrhea and sore throat.    Eyes:  Negative for pain and discharge.   Respiratory:  Negative for cough, shortness of breath and wheezing.    Cardiovascular:  Negative for chest pain and leg swelling.   Gastrointestinal:  Negative for abdominal pain, constipation and diarrhea.   Musculoskeletal:  Negative for joint swelling and neck pain.        Chronic pain d/t MS.   Skin:  Negative for rash and wound.   Neurological:  Negative for dizziness and headaches.   Psychiatric/Behavioral:  Negative for sleep disturbance. The patient is not nervous/anxious.      BP (!) 140/74   Pulse 58   Resp 16   SpO2 100%     Physical Exam  Constitutional:       General: She is not in acute distress.  HENT:      Head: Normocephalic and atraumatic.      Right Ear: External ear normal.      Left Ear: External ear normal.      Nose: Nose normal. No rhinorrhea.      Mouth/Throat:      Mouth: Mucous membranes are moist.   Eyes:      General:         Right eye: No discharge.         Left eye: No discharge.      Extraocular Movements: Extraocular movements intact.      Conjunctiva/sclera: Conjunctivae normal.   Cardiovascular:      Rate and Rhythm: Normal rate and regular rhythm.   Pulmonary:      Effort: Pulmonary effort is normal. No respiratory distress.      Breath sounds: Normal breath sounds. No wheezing.   Abdominal:      General: Bowel sounds are normal. There is no distension.      Palpations: Abdomen is soft.      Tenderness: There is no abdominal tenderness. There is no guarding.   Musculoskeletal:      Cervical back: Normal range of motion.   Neurological:      Mental Status: She is alert and oriented to person, place, and time.      Comments: Able to stand from living room recliner to ambulate with rollator.   Psychiatric:         Mood and Affect: Mood normal.         Behavior: Behavior normal.      Comments: Denies SI.       Mireya Lane MD

## 2024-11-01 RX ORDER — SIMVASTATIN 20 MG
20 TABLET ORAL EVERY EVENING
Qty: 90 TABLET | Refills: 1 | Status: SHIPPED | OUTPATIENT
Start: 2024-11-01

## 2024-11-01 RX ORDER — HYDROCODONE BITARTRATE AND ACETAMINOPHEN 5; 325 MG/1; MG/1
1 TABLET ORAL DAILY PRN
Qty: 20 TABLET | Refills: 0 | Status: SHIPPED | OUTPATIENT
Start: 2024-11-01 | End: 2024-11-21

## 2024-11-07 ENCOUNTER — OFFICE VISIT (OUTPATIENT)
Facility: CLINIC | Age: 79
End: 2024-11-07

## 2024-11-07 DIAGNOSIS — Z76.89 ENCOUNTER FOR SOCIAL WORK INTERVENTION: Primary | ICD-10-CM

## 2024-11-08 SDOH — ECONOMIC STABILITY: INCOME INSECURITY: IN THE LAST 12 MONTHS, WAS THERE A TIME WHEN YOU WERE NOT ABLE TO PAY THE MORTGAGE OR RENT ON TIME?: NO

## 2024-11-08 SDOH — HEALTH STABILITY: MENTAL HEALTH: HOW MANY STANDARD DRINKS CONTAINING ALCOHOL DO YOU HAVE ON A TYPICAL DAY?: PATIENT DOES NOT DRINK

## 2024-11-08 SDOH — SOCIAL STABILITY: SOCIAL NETWORK: HOW OFTEN DO YOU ATTENT MEETINGS OF THE CLUB OR ORGANIZATION YOU BELONG TO?: NEVER

## 2024-11-08 SDOH — ECONOMIC STABILITY: INCOME INSECURITY: HOW HARD IS IT FOR YOU TO PAY FOR THE VERY BASICS LIKE FOOD, HOUSING, MEDICAL CARE, AND HEATING?: NOT HARD AT ALL

## 2024-11-08 SDOH — ECONOMIC STABILITY: FOOD INSECURITY: WITHIN THE PAST 12 MONTHS, YOU WORRIED THAT YOUR FOOD WOULD RUN OUT BEFORE YOU GOT MONEY TO BUY MORE.: NEVER TRUE

## 2024-11-08 SDOH — HEALTH STABILITY: MENTAL HEALTH: HOW OFTEN DO YOU HAVE A DRINK CONTAINING ALCOHOL?: NEVER

## 2024-11-08 SDOH — ECONOMIC STABILITY: FOOD INSECURITY: WITHIN THE PAST 12 MONTHS, THE FOOD YOU BOUGHT JUST DIDN'T LAST AND YOU DIDN'T HAVE MONEY TO GET MORE.: NEVER TRUE

## 2024-11-08 SDOH — SOCIAL STABILITY: SOCIAL NETWORK: ARE YOU MARRIED, WIDOWED, DIVORCED, SEPARATED, NEVER MARRIED, OR LIVING WITH A PARTNER?: DIVORCED

## 2024-11-08 SDOH — SOCIAL STABILITY: SOCIAL NETWORK: HOW OFTEN DO YOU ATTEND CHURCH OR RELIGIOUS SERVICES?: NEVER

## 2024-11-08 SDOH — HEALTH STABILITY: MENTAL HEALTH
STRESS IS WHEN SOMEONE FEELS TENSE, NERVOUS, ANXIOUS, OR CAN'T SLEEP AT NIGHT BECAUSE THEIR MIND IS TROUBLED. HOW STRESSED ARE YOU?: NOT AT ALL

## 2024-11-08 SDOH — SOCIAL STABILITY: SOCIAL NETWORK: HOW OFTEN DO YOU GET TOGETHER WITH FRIENDS OR RELATIVES?: ONCE A WEEK

## 2024-11-08 SDOH — ECONOMIC STABILITY: TRANSPORTATION INSECURITY
IN THE PAST 12 MONTHS, HAS THE LACK OF TRANSPORTATION KEPT YOU FROM MEDICAL APPOINTMENTS OR FROM GETTING MEDICATIONS?: NO

## 2024-11-08 SDOH — SOCIAL STABILITY: SOCIAL NETWORK
DO YOU BELONG TO ANY CLUBS OR ORGANIZATIONS SUCH AS CHURCH GROUPS UNIONS, FRATERNAL OR ATHLETIC GROUPS, OR SCHOOL GROUPS?: NO

## 2024-11-08 SDOH — HEALTH STABILITY: PHYSICAL HEALTH: ON AVERAGE, HOW MANY DAYS PER WEEK DO YOU ENGAGE IN MODERATE TO STRENUOUS EXERCISE (LIKE A BRISK WALK)?: 0 DAYS

## 2024-11-08 SDOH — HEALTH STABILITY: PHYSICAL HEALTH: ON AVERAGE, HOW MANY MINUTES DO YOU ENGAGE IN EXERCISE AT THIS LEVEL?: 0 MIN

## 2024-11-08 SDOH — SOCIAL STABILITY: SOCIAL NETWORK: IN A TYPICAL WEEK, HOW MANY TIMES DO YOU TALK ON THE PHONE WITH FAMILY, FRIENDS, OR NEIGHBORS?: THREE TIMES A WEEK
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
## 2024-11-15 DIAGNOSIS — E03.9 HYPOTHYROIDISM, UNSPECIFIED TYPE: ICD-10-CM

## 2024-11-15 RX ORDER — LEVOTHYROXINE SODIUM 112 UG/1
112 TABLET ORAL
Qty: 90 TABLET | Refills: 1 | Status: SHIPPED | OUTPATIENT
Start: 2024-11-15

## 2024-11-21 DIAGNOSIS — M81.0 AGE-RELATED OSTEOPOROSIS WITHOUT CURRENT PATHOLOGICAL FRACTURE: ICD-10-CM

## 2024-11-22 RX ORDER — ALENDRONATE SODIUM 70 MG/1
TABLET ORAL
Qty: 12 TABLET | Refills: 1 | Status: SHIPPED | OUTPATIENT
Start: 2024-11-22

## 2024-11-26 ENCOUNTER — TELEPHONE (OUTPATIENT)
Facility: CLINIC | Age: 79
End: 2024-11-26

## 2024-11-26 NOTE — TELEPHONE ENCOUNTER
I called Vikram to request assistance in processing a chart request.  Chastity transferred the request for electronic processing.  Outreach ID [id number]

## 2024-12-10 ENCOUNTER — CLINICAL DOCUMENTATION (OUTPATIENT)
Facility: CLINIC | Age: 79
End: 2024-12-10

## 2024-12-10 ENCOUNTER — IMMUNIZATION (OUTPATIENT)
Age: 79
End: 2024-12-10

## 2024-12-10 NOTE — PROGRESS NOTES
[facility] - Plan of Care  [address], [address]  [place] [address]    [facility] Team Members: Cornel Hines MD; Mireya Lane MD; Glenna Belcher NP; Raven Hines, RN; Landen Nielson, RN; Stefany Hidalgo, RN; ALIYAH Dutta CHANCE Brandon  1945 / [id number]  female    The physician has reviewed and discussed the plan of care with the interdisciplinary group on 12/10/24 and agrees.  Date of Initial Visit (Start of Care): 9/13/2023    Diagnoses  Patient Active Problem List   Diagnosis    Severe obesity (BMI 35.0-39.9) with comorbidity    Mild episode of recurrent major depressive disorder (HCC)    CAD (coronary artery disease)    Renal stones    Lymphocytic colitis    HTN (hypertension)    Overactive detrusor    Prolapse of vaginal wall with midline cystocele    Left-sided weakness    History of colitis    Erosive oral lichen planus    Functional incontinence    Multiple sclerosis (HCC)    Multiple joint pain    Chronic bilateral low back pain with right-sided sciatica    Arthritis    Hypothyroidism    Hx of migraine headaches    RLS (restless legs syndrome)    Chronic edema    Hyperlipidemia    Other specified glaucoma    Urine, incontinence, stress female    Osteoporosis    Chronic obstructive pulmonary disease (HCC)    Chronic right shoulder pain    Other chronic pain       Advance Care Planning:    Code Status: Prior        Primary Decision Maker: Javon Street - Child - [phone]    Secondary Decision Maker: Fercho Taylor - Child - [phone]       12/10/2024    12:00 PM   Demographics   Marital Status Legally        DME/Supplies:  Bedside Commode, Lift chair, Rolling walker, and Walker     Allergies   Allergen Reactions    Alpha-Gal     Brimonidine Other (See Comments)     Severe reaction and damage to the cornea    Adhesive Tape Rash       Nutritional Requirements:   Oral with supported meal preparation    Functional/Activity Level:  Ambulatory with

## 2024-12-23 DIAGNOSIS — I10 PRIMARY HYPERTENSION: ICD-10-CM

## 2024-12-23 DIAGNOSIS — M54.41 CHRONIC BILATERAL LOW BACK PAIN WITH RIGHT-SIDED SCIATICA: ICD-10-CM

## 2024-12-23 DIAGNOSIS — F33.0 MILD EPISODE OF RECURRENT MAJOR DEPRESSIVE DISORDER (HCC): ICD-10-CM

## 2024-12-23 DIAGNOSIS — G89.29 CHRONIC BILATERAL LOW BACK PAIN WITH RIGHT-SIDED SCIATICA: ICD-10-CM

## 2024-12-23 RX ORDER — LOSARTAN POTASSIUM AND HYDROCHLOROTHIAZIDE 25; 100 MG/1; MG/1
TABLET ORAL
Qty: 90 TABLET | Refills: 1 | Status: SHIPPED | OUTPATIENT
Start: 2024-12-23

## 2024-12-23 RX ORDER — DULOXETIN HYDROCHLORIDE 30 MG/1
CAPSULE, DELAYED RELEASE ORAL
Qty: 90 CAPSULE | Refills: 1 | Status: SHIPPED | OUTPATIENT
Start: 2024-12-23

## 2024-12-23 NOTE — TELEPHONE ENCOUNTER
Last appointment: 10/31/24  Next appointment: 2/3/25  Previous refill encounter(s): 9/13/23    Requested Prescriptions     Pending Prescriptions Disp Refills    losartan-hydroCHLOROthiazide (HYZAAR) 100-25 MG per tablet 90 tablet 1     Sig: Take 1 tab by mouth daily for blood pressure.         For Pharmacy Admin Tracking Only    Program: Medication Refill  CPA in place:    Recommendation Provided To:   Intervention Detail: New Rx: 1, reason: Patient Preference  Intervention Accepted By:   Gap Closed?:    Time Spent (min): 5

## 2024-12-27 DIAGNOSIS — F33.0 MILD EPISODE OF RECURRENT MAJOR DEPRESSIVE DISORDER (HCC): ICD-10-CM

## 2024-12-27 RX ORDER — BUPROPION HYDROCHLORIDE 150 MG/1
150 TABLET ORAL EVERY MORNING
Qty: 90 TABLET | Refills: 1 | Status: SHIPPED | OUTPATIENT
Start: 2024-12-27

## 2025-01-02 ENCOUNTER — TELEPHONE (OUTPATIENT)
Facility: CLINIC | Age: 80
End: 2025-01-02

## 2025-01-02 NOTE — TELEPHONE ENCOUNTER
LCSW called pt for routine social work check-in. Pt answered the phone. She stated that things \"could be better\". She said that her daughter is still very upset with her that pt took her off of having Power of . Daughter was at the home when LCSW called, so pt was hesitant to speak openly and honestly on the phone. LCSW offered an in-home visit after her daughter goes back to work next week. She said she would be open to that. LCSW will call pt next week and schedule in-home appointment for supportive counseling.     AMIE Dutta, [facility]     [facility]  Primary Care at [facility] Building   [address], [address]   [place] [address] (C) [phone]  (O) [phone]  [email or web address]

## 2025-01-10 DIAGNOSIS — M54.41 CHRONIC BILATERAL LOW BACK PAIN WITH RIGHT-SIDED SCIATICA: ICD-10-CM

## 2025-01-10 DIAGNOSIS — G89.29 CHRONIC BILATERAL LOW BACK PAIN WITH RIGHT-SIDED SCIATICA: ICD-10-CM

## 2025-01-10 DIAGNOSIS — G35 MULTIPLE SCLEROSIS (HCC): ICD-10-CM

## 2025-01-10 RX ORDER — HYDROCODONE BITARTRATE AND ACETAMINOPHEN 5; 325 MG/1; MG/1
1 TABLET ORAL DAILY PRN
Qty: 20 TABLET | Refills: 0 | Status: SHIPPED | OUTPATIENT
Start: 2025-01-10 | End: 2025-01-30

## 2025-01-10 NOTE — TELEPHONE ENCOUNTER
Triage for Controlled Substance Refill Request    Pain Diagnosis: Multiple Joint Pain    Last Outpatient Visit: 10/31/24     Next Outpatient Visit: 2/3/25    Reason for refill needed outside of office visit?  -Appointment not scheduled prior to need for scheduled refill    Pharmacy: [facility]    Medication: Hydrocodone-acetaminophen 5-325mg  Dose and directions: Take 1 tab daily PRN for pain  Number dispensed: 20  Date filled ( or Pharmacy): 11/1/2024     reviewed: Yes    Date of Urine Drug Screen:  11/28/23    Action: Pended to Glenna Belcher NP

## 2025-01-10 NOTE — TELEPHONE ENCOUNTER
Pt left vm requesting hydrocodone be refilled at [facility], stated they cannot accept an escript.

## 2025-01-16 ENCOUNTER — TELEPHONE (OUTPATIENT)
Facility: CLINIC | Age: 80
End: 2025-01-16

## 2025-01-23 ENCOUNTER — OFFICE VISIT (OUTPATIENT)
Facility: CLINIC | Age: 80
End: 2025-01-23

## 2025-01-23 DIAGNOSIS — Z76.89 ENCOUNTER FOR SOCIAL WORK INTERVENTION: Primary | ICD-10-CM

## 2025-01-23 NOTE — PROGRESS NOTES
[facility]  Social Work Assessment    Patient name: Jessi Taylor    Date of visit: 1/23/25    Session today completed with: Jessi Taylor    Relationship to patient: Self    Purpose of visit: Supportive counseling    Visit narrative: LCSW visited with pt in her home where her daughter, Javon, lives with her, as well as her granddtr, Atul (part-time). Javon was at work during today's visit. Pt was alert and oriented, sitting in recliner chair in the living room. She was open to conversation today and appeared to enjoy having company and conversation. She said she has a Zoom meeting this afternoon with a group of friends she has known since high school. She said there use to be 20 friends in this group, and now there are about 8. She finds good support from these friends, as well as other friends, and her sons. She talked about how she has been going [place] [address] with her son, and how she enjoys going out for car rides with him. Pt mostly stays home, is alone during the day while Javon works, but she said she prefers to be alone and she feels safe in the home by herself. She has an emergency call device necklace that she wears, in the event of a fall. Her daughter in law, Miley, makes soups for pt to eat, and she said she enjoys having different soups for lunch each day. No concerns re: food, medications, safety in the home.     Plan for follow up: LCSW to follow up with pt in 2-3 months, and offer in-home appointment for supportive counseling.       AMIE Dutta, [facility]    Primary Care at Home  (O) [phone]  (C) [phone]

## 2025-01-24 SDOH — HEALTH STABILITY: PHYSICAL HEALTH: ON AVERAGE, HOW MANY MINUTES DO YOU ENGAGE IN EXERCISE AT THIS LEVEL?: 0 MIN

## 2025-01-24 SDOH — SOCIAL STABILITY: SOCIAL NETWORK: HOW OFTEN DO YOU ATTENT MEETINGS OF THE CLUB OR ORGANIZATION YOU BELONG TO?: NEVER

## 2025-01-24 SDOH — ECONOMIC STABILITY: FOOD INSECURITY: WITHIN THE PAST 12 MONTHS, YOU WORRIED THAT YOUR FOOD WOULD RUN OUT BEFORE YOU GOT MONEY TO BUY MORE.: NEVER TRUE

## 2025-01-24 SDOH — ECONOMIC STABILITY: FOOD INSECURITY: WITHIN THE PAST 12 MONTHS, THE FOOD YOU BOUGHT JUST DIDN'T LAST AND YOU DIDN'T HAVE MONEY TO GET MORE.: NEVER TRUE

## 2025-01-24 SDOH — SOCIAL STABILITY: SOCIAL NETWORK: HOW OFTEN DO YOU GET TOGETHER WITH FRIENDS OR RELATIVES?: MORE THAN THREE TIMES A WEEK

## 2025-01-24 SDOH — ECONOMIC STABILITY: INCOME INSECURITY: IN THE LAST 12 MONTHS, WAS THERE A TIME WHEN YOU WERE NOT ABLE TO PAY THE MORTGAGE OR RENT ON TIME?: NO

## 2025-01-24 SDOH — SOCIAL STABILITY: SOCIAL NETWORK: ARE YOU MARRIED, WIDOWED, DIVORCED, SEPARATED, NEVER MARRIED, OR LIVING WITH A PARTNER?: WIDOWED

## 2025-01-24 SDOH — HEALTH STABILITY: MENTAL HEALTH
STRESS IS WHEN SOMEONE FEELS TENSE, NERVOUS, ANXIOUS, OR CAN'T SLEEP AT NIGHT BECAUSE THEIR MIND IS TROUBLED. HOW STRESSED ARE YOU?: ONLY A LITTLE

## 2025-01-24 SDOH — HEALTH STABILITY: MENTAL HEALTH: HOW OFTEN DO YOU HAVE A DRINK CONTAINING ALCOHOL?: NEVER

## 2025-01-24 SDOH — HEALTH STABILITY: PHYSICAL HEALTH: ON AVERAGE, HOW MANY DAYS PER WEEK DO YOU ENGAGE IN MODERATE TO STRENUOUS EXERCISE (LIKE A BRISK WALK)?: 0 DAYS

## 2025-01-24 SDOH — HEALTH STABILITY: MENTAL HEALTH: HOW MANY STANDARD DRINKS CONTAINING ALCOHOL DO YOU HAVE ON A TYPICAL DAY?: PATIENT DOES NOT DRINK

## 2025-01-24 SDOH — SOCIAL STABILITY: SOCIAL NETWORK: HOW OFTEN DO YOU ATTEND CHURCH OR RELIGIOUS SERVICES?: NEVER

## 2025-01-24 SDOH — ECONOMIC STABILITY: INCOME INSECURITY: HOW HARD IS IT FOR YOU TO PAY FOR THE VERY BASICS LIKE FOOD, HOUSING, MEDICAL CARE, AND HEATING?: NOT HARD AT ALL

## 2025-01-24 SDOH — SOCIAL STABILITY: SOCIAL NETWORK
IN A TYPICAL WEEK, HOW MANY TIMES DO YOU TALK ON THE PHONE WITH FAMILY, FRIENDS, OR NEIGHBORS?: MORE THAN THREE TIMES A WEEK

## 2025-01-28 ENCOUNTER — TELEPHONE (OUTPATIENT)
Facility: CLINIC | Age: 80
End: 2025-01-28

## 2025-01-28 NOTE — TELEPHONE ENCOUNTER
Called patient to confirm their in home visit with Dr. Lane on 02/03/2025, arrival between 9-1.  Spoke with Jessi Taylor, they confirmed the appointment and answered the covid screen questions. Does anyone in the household have covid No Have there been any changes to insurance No or location No

## 2025-01-30 NOTE — TELEPHONE ENCOUNTER
LCSW spoke with pt to schedule next in-home appointment for supportive counseling. LCSW to visit on 1/23/25.    AMIE Dutta, LCSW     [facility]  Primary Care at [facility] Building   [address], [address]   [place] [address]  (C) [phone]  (O) [phone]  [email or web address]

## 2025-02-03 ENCOUNTER — TELEPHONE (OUTPATIENT)
Facility: CLINIC | Age: 80
End: 2025-02-03

## 2025-02-03 NOTE — TELEPHONE ENCOUNTER
Call placed to patient - I spoke with the patient, she reports that she is feeling well. She states that she was very sick last week, but she is doing well now. After she got sick, her daughter was sick and now her granddaughter is sick.   I asked if patient needs any additional support or has any questions, she responded that she does not. She says again that she is doing well at this time.      I informed her that she would have a call for arrangements to reschedule her visit with PCP. Patient verbalized appreciation for the call.

## 2025-02-10 ENCOUNTER — TELEPHONE (OUTPATIENT)
Facility: CLINIC | Age: 80
End: 2025-02-10

## 2025-02-10 NOTE — TELEPHONE ENCOUNTER
Per Dr. Lane, call to patient to reschedule her in home visit to Friday, 2/14/25.  Jessi states p.m. is better but will accept an a.m. arrival too.  Perfect Serve to Dr. Lane, she acknowledged \"ok, thank you\".  This is changed in epic/outlook.

## 2025-02-14 ENCOUNTER — OFFICE VISIT (OUTPATIENT)
Facility: CLINIC | Age: 80
End: 2025-02-14
Payer: MEDICARE

## 2025-02-14 DIAGNOSIS — Z74.09 IMPAIRED MOBILITY: ICD-10-CM

## 2025-02-14 DIAGNOSIS — N32.81 OVERACTIVE DETRUSOR: ICD-10-CM

## 2025-02-14 DIAGNOSIS — G35 MULTIPLE SCLEROSIS (HCC): Primary | ICD-10-CM

## 2025-02-14 DIAGNOSIS — I10 PRIMARY HYPERTENSION: ICD-10-CM

## 2025-02-14 DIAGNOSIS — E03.9 HYPOTHYROIDISM, UNSPECIFIED TYPE: ICD-10-CM

## 2025-02-14 DIAGNOSIS — G47.09 OTHER INSOMNIA: ICD-10-CM

## 2025-02-14 DIAGNOSIS — J44.9 CHRONIC OBSTRUCTIVE PULMONARY DISEASE, UNSPECIFIED COPD TYPE (HCC): ICD-10-CM

## 2025-02-14 DIAGNOSIS — M25.50 MULTIPLE JOINT PAIN: ICD-10-CM

## 2025-02-14 DIAGNOSIS — F33.0 MILD EPISODE OF RECURRENT MAJOR DEPRESSIVE DISORDER: ICD-10-CM

## 2025-02-14 PROCEDURE — 1159F MED LIST DOCD IN RCRD: CPT | Performed by: FAMILY MEDICINE

## 2025-02-14 PROCEDURE — 1090F PRES/ABSN URINE INCON ASSESS: CPT | Performed by: FAMILY MEDICINE

## 2025-02-14 PROCEDURE — 1123F ACP DISCUSS/DSCN MKR DOCD: CPT | Performed by: FAMILY MEDICINE

## 2025-02-14 PROCEDURE — 1160F RVW MEDS BY RX/DR IN RCRD: CPT | Performed by: FAMILY MEDICINE

## 2025-02-14 PROCEDURE — 99350 HOME/RES VST EST HIGH MDM 60: CPT | Performed by: FAMILY MEDICINE

## 2025-02-14 PROCEDURE — 3075F SYST BP GE 130 - 139MM HG: CPT | Performed by: FAMILY MEDICINE

## 2025-02-14 PROCEDURE — 1036F TOBACCO NON-USER: CPT | Performed by: FAMILY MEDICINE

## 2025-02-14 PROCEDURE — 3078F DIAST BP <80 MM HG: CPT | Performed by: FAMILY MEDICINE

## 2025-02-14 PROCEDURE — G8417 CALC BMI ABV UP PARAM F/U: HCPCS | Performed by: FAMILY MEDICINE

## 2025-02-14 NOTE — PROGRESS NOTES
[facility]    [phone]      NOTE: [facility] is a PROVIDER (MD/NP) based interdisciplinary practice (RN, LCSW) for patients who cannot access (or have a taxing effort) primary / speciality medical care in an office setting. Primary Care At Home is NOT Home Health Care but works with Home Health Care Agencies.when there is a skilled need. Our MD/NPs are integral in Care Transitions; PLEASE CALL  if this patient arrives in the Emergency Department or Hospital.        Date of Current Visit: 2/14/2025    ASSESSMENT/PLAN:   Diagnosis Orders   1. Multiple sclerosis (HCC)        2. Chronic obstructive pulmonary disease, unspecified COPD type (HCC)        3. Primary hypertension        4. Hypothyroidism, unspecified type        5. Overactive detrusor        6. Mild episode of recurrent major depressive disorder        7. Other insomnia        8. Multiple joint pain        9. Impaired mobility            Multiple Sclerosis: Previously followed by Dr. Garsia, [facility]. No longer on disease-modifying medications and does not wish to f/u with Neurology. Prednisone helping significantly with chronic pain and functional status. We have discussed the risks of long-term prednisone use, and patient wishes to continue as she finds benefits>risks/SE. Tylenol and prn norco 5-325 mg on severe pain days is helping pain control. Discussed again that the goal is to use the opioid during the day only on the most severe pain days rather than using it regularly. She is agreeable to using norco only on severe pain days. She is not using norco daily and reports she has used about 3 pills since last refill. Controlled substance agreement and urine toxicology screen completed February 2024--recommend repeating with next labs. Per chart she has been noted to use marijuana in the past for additional symptom relief. Patient had not shared this with me before but during this

## 2025-02-25 VITALS
DIASTOLIC BLOOD PRESSURE: 74 MMHG | SYSTOLIC BLOOD PRESSURE: 130 MMHG | BODY MASS INDEX: 31.71 KG/M2 | OXYGEN SATURATION: 98 % | HEART RATE: 69 BPM | RESPIRATION RATE: 18 BRPM | WEIGHT: 179 LBS

## 2025-02-25 DIAGNOSIS — M54.41 CHRONIC BILATERAL LOW BACK PAIN WITH RIGHT-SIDED SCIATICA: ICD-10-CM

## 2025-02-25 DIAGNOSIS — G35 MULTIPLE SCLEROSIS (HCC): ICD-10-CM

## 2025-02-25 DIAGNOSIS — G89.29 CHRONIC BILATERAL LOW BACK PAIN WITH RIGHT-SIDED SCIATICA: ICD-10-CM

## 2025-02-25 DIAGNOSIS — G47.00 INSOMNIA, UNSPECIFIED TYPE: ICD-10-CM

## 2025-02-25 PROBLEM — G47.09 OTHER INSOMNIA: Status: ACTIVE | Noted: 2025-02-25

## 2025-02-25 RX ORDER — HYDROCODONE BITARTRATE AND ACETAMINOPHEN 5; 325 MG/1; MG/1
1 TABLET ORAL DAILY PRN
Qty: 30 TABLET | Refills: 0 | Status: SHIPPED | OUTPATIENT
Start: 2025-02-25 | End: 2025-03-27

## 2025-02-25 RX ORDER — PREDNISONE 5 MG/1
5 TABLET ORAL DAILY
Qty: 90 TABLET | Refills: 1 | Status: SHIPPED | OUTPATIENT
Start: 2025-02-25

## 2025-02-25 RX ORDER — QUETIAPINE FUMARATE 25 MG/1
25 TABLET, FILM COATED ORAL NIGHTLY
Qty: 90 TABLET | Refills: 1 | Status: SHIPPED | OUTPATIENT
Start: 2025-02-25

## 2025-02-25 NOTE — TELEPHONE ENCOUNTER
The patient called to request refill on the following script:    Hydrocodone acetaminophen, 5-325 mgs    The patient uses [facility].  Her callback # is [phone].

## 2025-02-25 NOTE — TELEPHONE ENCOUNTER
Triage for Controlled Substance Refill Request    Pain Diagnosis: Multiple Joint Pain    Last Outpatient Visit: 2/14/25     Reason for refill needed outside of office visit?  -Appointment not scheduled prior to need for scheduled refill    Pharmacy: [facility]    Medication: Hydrocodone-acetaminophen 5-325mg  Dose and directions: Take 1 tab daily PRN for pain  Number dispensed: 20  Date filled ( or Pharmacy): 1/10/2025    Date of Urine Drug Screen:  11/28/23    Action: Pended to Dr. Hines

## 2025-03-24 ENCOUNTER — TELEPHONE (OUTPATIENT)
Facility: CLINIC | Age: 80
End: 2025-03-24

## 2025-03-24 NOTE — TELEPHONE ENCOUNTER
Javon Taylor Castillo called to update that the patient needs a f/u appt.  She saw [facility] on Tuesday last week.  The patient's ankle is swollen with a cyst above the swelling.  [facility] took xrays and sent the results Friday.  They said that there was no broken bone.  The patient does not have any pain, but last night the cyst area felt warm. Javon states that it is ok to call the patient directly and anytime this week, 12-4 arrival works best for the patient.  Javon's callback if needed is [phone]     The patient's callback is [phone]

## 2025-03-25 DIAGNOSIS — G35 MULTIPLE SCLEROSIS (HCC): ICD-10-CM

## 2025-03-25 DIAGNOSIS — G89.29 CHRONIC BILATERAL LOW BACK PAIN WITH RIGHT-SIDED SCIATICA: ICD-10-CM

## 2025-03-25 DIAGNOSIS — M54.41 CHRONIC BILATERAL LOW BACK PAIN WITH RIGHT-SIDED SCIATICA: ICD-10-CM

## 2025-03-25 RX ORDER — HYDROCODONE BITARTRATE AND ACETAMINOPHEN 5; 325 MG/1; MG/1
1 TABLET ORAL DAILY PRN
Qty: 30 TABLET | Refills: 0 | Status: CANCELLED | OUTPATIENT
Start: 2025-03-25 | End: 2025-04-24

## 2025-03-25 NOTE — TELEPHONE ENCOUNTER
Call placed to patient - She reports that her daughter is concerned about her ankle. Patient states that she doesn't have any pain and it doesn't bother her. Javon says that the area is swollen and warm at times, but patient can't feel the heat. Patient also verbalizes that it is hard for her to see her ankle, therefore she is not sure if the swelling goes down at night while her bed is elevated.   During the call, patient requests refill of Hydrocodone-acetaminophen (Norco).

## 2025-03-26 NOTE — TELEPHONE ENCOUNTER
Patient reported to me on 2/14 that she is not using norco daily and we discussed she would use opioid only on her most severe pain days. Last received norco refill on 2/25 and requested refill today. If patient's frequency of norco use has increased I think a visit may be needed to discuss her symptoms and pain management in more detail.

## 2025-03-27 ENCOUNTER — HOSPITAL ENCOUNTER (EMERGENCY)
Facility: HOSPITAL | Age: 80
Discharge: HOME | End: 2025-03-27
Attending: EMERGENCY MEDICINE
Payer: MEDICARE

## 2025-03-27 ENCOUNTER — TELEPHONE (OUTPATIENT)
Facility: CLINIC | Age: 80
End: 2025-03-27

## 2025-03-27 VITALS
TEMPERATURE: 98.2 F | WEIGHT: 186.29 LBS | DIASTOLIC BLOOD PRESSURE: 75 MMHG | OXYGEN SATURATION: 97 % | BODY MASS INDEX: 33 KG/M2 | RESPIRATION RATE: 16 BRPM | SYSTOLIC BLOOD PRESSURE: 144 MMHG | HEART RATE: 70 BPM

## 2025-03-27 DIAGNOSIS — M25.472 LEFT ANKLE SWELLING: Primary | ICD-10-CM

## 2025-03-27 PROCEDURE — 99282 EMERGENCY DEPT VISIT SF MDM: CPT

## 2025-03-27 NOTE — TELEPHONE ENCOUNTER
Javon Yenifer is the patient's daughter.  She called to report that patient's ankle cyst is bigger and red/blue today.  She has pictures but is not able to upload them to [facility], she is no longer POA for patient.      Javon asks if the patient should go to the ED.  She also has a question about one of the patient's medications.  Callback is  [phone].

## 2025-03-27 NOTE — DISCHARGE INSTRUCTIONS
Based on your examination today I have decreased suspicion for septic joint causing this swelling as you do have full range of motion of the left ankle and no pain associated with the swelling.  We did not obtain x-rays today per your request as you have had previous x-rays completed that did not show evidence of a fracture.  Generally in the emergency department we do not tap or drain joints as it would introduce more infection.  However for these cases inquiring drainage of a joint we do send people to Ortho and recommend compression of the joint and elevation.  You did not have a history of heart failure or liver issues to determine why the swelling could happen.  I have decreased suspicion for a DVT based on the fact that the swelling is localized to the ankle.  Since the area is not causing you pain and there are no concerning signs on my examination today, I recommend using the Ace wrap to provide compression to the area and following up with Ortho as soon as possible.  If you develop any worsening symptoms, change in symptoms, or persistent symptoms then please come back to the emergency department.  I would also follow-up with your palliative care to see what they would recommend for further investigation and management.    Thank you for allowing us to provide you with medical care today.  We realize that you have many choices for your emergency care needs.  We thank you for choosing [facility].  Please choose us in the future for any continued health care needs.     The exam and treatment you received in the Emergency Department were for an emergent problem and are not intended as complete care. It is important that you follow up with a doctor, nurse practitioner, or physician assistant for ongoing care. If your symptoms worsen or you do not improve as expected and you are unable to reach your usual health care provider, you should return to the Emergency Department. We are available 24 hours a day.      Please make an appointment with your healthcare provider(s) for follow up of your Emergency Department visit.  Take this sheet with you when you go to your follow-up visit.

## 2025-03-27 NOTE — TELEPHONE ENCOUNTER
Call returned to patient's daughter - voice message left explained that I discussed with Dr. Lane - MD recommends either taking patient to the ER (or urgent care), as the area might need to be drained.  Dr. Lane could make a visit tomorrow, 3/28, but unfortunately she would not be able to have imaging done and being able to drain the area if needed.  Call back encouraged if additional information is needed.

## 2025-03-27 NOTE — ED PROVIDER NOTES
[facility] EMERGENCY DEPARTMENT  EMERGENCY DEPARTMENT ENCOUNTER      Pt Name: Jessi Taylor  MRN: [id number]  Birthdate 1945  Date of evaluation: 3/27/2025  Provider: Priscila Callejas PA-C    CHIEF COMPLAINT       Chief Complaint   Patient presents with    Ankle Pain         HISTORY OF PRESENT ILLNESS   (Location/Symptom, Timing/Onset, Context/Setting, Quality, Duration, Modifying Factors, Severity)  Note limiting factors.   Patient is an 80-year-old female arriving via wheelchair with her daughter.  She has complaints of left ankle swelling that has been present for the last week.  She also notes that she has a bruise to the top of her left foot that she is not here for but states that she knows that his bruising is from a rollator incident and she does not have concerns about it at this time.  Patient states that she is seen by palliative care and she had an x-ray initially which revealed no fracture.  Does not want another x-ray completed today.  Patient sent here for concerns that it might need to be drained.  Denies recent fevers.  Denies past medical history of cardiac, liver, or kidney issues.  Does have a history of MS.  Is able to ambulate with her rollator and bear weight and states that the swelling does not cause her any pain.  Denies any other areas of leg swelling that is different from baseline.            Review of External Medical Records:     Nursing Notes were reviewed.    REVIEW OF SYSTEMS    (2-9 systems for level 4, 10 or more for level 5)     Review of Systems    Except as noted above the remainder of the review of systems was reviewed and negative.       PAST MEDICAL HISTORY     Past Medical History:   Diagnosis Date    CAD (coronary artery disease) 08/07/2012    Chronic pain     Depression     Dyspepsia and other specified disorders of function of stomach     GERD (gastroesophageal reflux disease)     Glaucoma 08/07/2012    Hx of migraine headaches 08/07/2012    Hyperlipidemia  time and only wants to be worked up for the swelling behind her ankle.  The swelling is not pitting.  I have decreased suspicion for septic joint as she has full range of motion and no pain.  Decree suspicion for gout as the area is not tender or warm to the touch.  Decree suspicion for fracture based on previously good x-rays.  Patient does not have any cardiac history or liver or kidney history that could cause lower extremity edema.  Unlikely a DVT as this is a focalized area and there is no deep vein that runs through the area of the swelling.  I discussed with the patient that it is unknown what could cause this potential swelling but based on the fact that it is nontender and my examination today with good pulses and the leg is neurovascularly intact I have decreased suspicion that this is of emergent etiology at this time and I did recommend compressing the area and elevating the area.  I wrapped the patient's ankle with an Ace wrap and referred her to go to Ortho if she would like further investigation and management of this.    CAROLINA AGUIRRE notes: Based on your examination today I have decreased suspicion for septic joint causing this swelling as you do have full range of motion of the left ankle and no pain associated with the swelling. We did not obtain x-rays today per your request as you have had previous x-rays completed that did not show evidence of a fracture. Generally in the emergency department we do not tap or drain joints as it would introduce more infection. However for these cases inquiring drainage of a joint we do send people to Ortho and recommend compression of the joint and elevation. You did not have a history of heart failure or liver issues to determine why the swelling could happen. I have decreased suspicion for a DVT based on the fact that the swelling is localized to the ankle. Since the area is not causing you pain and there are no concerning signs on my examination today, I recommend

## 2025-03-27 NOTE — ED TRIAGE NOTES
She arrives in a hospital wheelchair to triage with her daughter.She is here for swelling of her left ankle for over a week. She has bruising of the top of her left foot from her rolator falling on it Sunday.She says she is not here for the foot injury but for the ankle swelling. She thinks it might need to be \"drained\". Seen by a provider in triage.

## 2025-03-27 NOTE — TELEPHONE ENCOUNTER
Javon called to update that she has decided to take the patient to the [facility] ED regarding the patient's ankle cyst.  Her callback if needed is [phone]

## 2025-04-07 DIAGNOSIS — G35 MULTIPLE SCLEROSIS (HCC): ICD-10-CM

## 2025-04-07 DIAGNOSIS — G89.29 CHRONIC BILATERAL LOW BACK PAIN WITH RIGHT-SIDED SCIATICA: ICD-10-CM

## 2025-04-07 DIAGNOSIS — M54.41 CHRONIC BILATERAL LOW BACK PAIN WITH RIGHT-SIDED SCIATICA: ICD-10-CM

## 2025-04-07 NOTE — TELEPHONE ENCOUNTER
The patient called to request refill on the following script:    Hydrocodone acetaminophen 5-325 mgs, she has 3 pills left and uses [facility] at [address] BEST reminded the patient that [facility] asks 24-48 hours to process refill requests, and to check back with her pharmacy on the status of the refill. She acknowledged. Her callback if needed is [phone]

## 2025-04-10 RX ORDER — HYDROCODONE BITARTRATE AND ACETAMINOPHEN 5; 325 MG/1; MG/1
1 TABLET ORAL DAILY PRN
Qty: 30 TABLET | Refills: 0 | Status: SHIPPED | OUTPATIENT
Start: 2025-04-10 | End: 2025-05-10

## 2025-04-17 ENCOUNTER — TRANSCRIBE ORDERS (OUTPATIENT)
Facility: HOSPITAL | Age: 80
End: 2025-04-17

## 2025-04-17 DIAGNOSIS — M79.672 LEFT FOOT PAIN: Primary | ICD-10-CM

## 2025-04-17 DIAGNOSIS — M25.572 LEFT ANKLE PAIN, UNSPECIFIED CHRONICITY: ICD-10-CM

## 2025-04-29 ENCOUNTER — HOSPITAL ENCOUNTER (OUTPATIENT)
Facility: HOSPITAL | Age: 80
Discharge: HOME | End: 2025-05-02
Attending: ORTHOPAEDIC SURGERY
Payer: MEDICARE

## 2025-04-29 DIAGNOSIS — M25.572 LEFT ANKLE PAIN, UNSPECIFIED CHRONICITY: ICD-10-CM

## 2025-04-29 DIAGNOSIS — M79.672 LEFT FOOT PAIN: ICD-10-CM

## 2025-04-29 PROCEDURE — 73721 MRI JNT OF LWR EXTRE W/O DYE: CPT

## 2025-05-02 ENCOUNTER — RESULTS FOLLOW-UP (OUTPATIENT)
Facility: CLINIC | Age: 80
End: 2025-05-02

## 2025-05-09 DIAGNOSIS — M81.0 AGE-RELATED OSTEOPOROSIS WITHOUT CURRENT PATHOLOGICAL FRACTURE: ICD-10-CM

## 2025-05-09 NOTE — TELEPHONE ENCOUNTER
Fax received from [facility] requesting refill for Alendronate.  Refill pended to Dr. Hines.

## 2025-05-13 RX ORDER — ALENDRONATE SODIUM 70 MG/1
70 TABLET ORAL
Qty: 13 TABLET | Refills: 1 | Status: SHIPPED | OUTPATIENT
Start: 2025-05-13

## 2025-05-13 NOTE — TELEPHONE ENCOUNTER
Fax received from [facility] pharmacy requesting refill of Alendronate 70mg.    Order was pended to PCP.

## 2025-05-22 DIAGNOSIS — E78.5 HYPERLIPIDEMIA, UNSPECIFIED HYPERLIPIDEMIA TYPE: ICD-10-CM

## 2025-05-22 DIAGNOSIS — E03.9 HYPOTHYROIDISM, UNSPECIFIED TYPE: ICD-10-CM

## 2025-05-22 RX ORDER — LEVOTHYROXINE SODIUM 112 UG/1
112 TABLET ORAL
Qty: 90 TABLET | Refills: 1 | Status: SHIPPED | OUTPATIENT
Start: 2025-05-22

## 2025-05-22 RX ORDER — SIMVASTATIN 20 MG
20 TABLET ORAL NIGHTLY
Qty: 90 TABLET | Refills: 1 | Status: SHIPPED | OUTPATIENT
Start: 2025-05-22

## 2025-05-22 NOTE — TELEPHONE ENCOUNTER
Fax received from [address]  pharmacy requesting refill of:   Simvastatin 20mg - Take 1 tab PO Nighly  Levothyroxine 112mcg - Take 1 tab PO Daily.    Rx pended to PCP.

## 2025-05-27 DIAGNOSIS — G89.29 CHRONIC BILATERAL LOW BACK PAIN WITH RIGHT-SIDED SCIATICA: ICD-10-CM

## 2025-05-27 DIAGNOSIS — G35 MULTIPLE SCLEROSIS (HCC): ICD-10-CM

## 2025-05-27 DIAGNOSIS — M54.41 CHRONIC BILATERAL LOW BACK PAIN WITH RIGHT-SIDED SCIATICA: ICD-10-CM

## 2025-05-27 NOTE — TELEPHONE ENCOUNTER
VM-2:45 pm-the patient left a message requesting refill of their hydrocodone acetaminophen script, 5-325 mgs.  Her callback if needed is [phone].

## 2025-05-28 RX ORDER — HYDROCODONE BITARTRATE AND ACETAMINOPHEN 5; 325 MG/1; MG/1
1 TABLET ORAL DAILY PRN
Qty: 30 TABLET | Refills: 0 | Status: SHIPPED | OUTPATIENT
Start: 2025-05-28 | End: 2025-06-27

## 2025-05-29 ENCOUNTER — TELEPHONE (OUTPATIENT)
Facility: CLINIC | Age: 80
End: 2025-05-29

## 2025-05-29 NOTE — TELEPHONE ENCOUNTER
The patient called and requested refill on her hydrocodone acetaminophen script.  I let her know that we received a receipt confirmed by the pharmacy on 5/28/25 at 3:18 pm for the refill and to please check back with her pharmacy for the status of the refill.  The patient acknowledged.  CB# if needed is [phone]

## 2025-06-04 ENCOUNTER — TELEPHONE (OUTPATIENT)
Facility: CLINIC | Age: 80
End: 2025-06-04

## 2025-06-04 NOTE — TELEPHONE ENCOUNTER
Telephone call to patient to offer home visit with Flora Garcia for Wed June 18 between 10 - 4.  Visit accepted and scheduled.

## 2025-06-12 ENCOUNTER — TELEPHONE (OUTPATIENT)
Facility: CLINIC | Age: 80
End: 2025-06-12

## 2025-06-12 NOTE — TELEPHONE ENCOUNTER
Called patient to confirm their in home visit with Dr. Hines on 06/18/2025, arrival between 10-4.  Spoke with Javon, they confirmed the appointment and answered the covid screen questions. Does anyone in the household have covid no  Have there been any changes to insurance no or location no

## 2025-06-18 ENCOUNTER — OFFICE VISIT (OUTPATIENT)
Facility: CLINIC | Age: 80
End: 2025-06-18
Payer: MEDICARE

## 2025-06-18 VITALS
RESPIRATION RATE: 16 BRPM | TEMPERATURE: 97.4 F | SYSTOLIC BLOOD PRESSURE: 132 MMHG | DIASTOLIC BLOOD PRESSURE: 66 MMHG | HEART RATE: 82 BPM | OXYGEN SATURATION: 96 %

## 2025-06-18 DIAGNOSIS — M81.0 AGE-RELATED OSTEOPOROSIS WITHOUT CURRENT PATHOLOGICAL FRACTURE: Chronic | ICD-10-CM

## 2025-06-18 DIAGNOSIS — G35 MULTIPLE SCLEROSIS (HCC): Chronic | ICD-10-CM

## 2025-06-18 DIAGNOSIS — J44.9 CHRONIC OBSTRUCTIVE PULMONARY DISEASE, UNSPECIFIED COPD TYPE (HCC): Chronic | ICD-10-CM

## 2025-06-18 DIAGNOSIS — E78.5 HYPERLIPIDEMIA, UNSPECIFIED HYPERLIPIDEMIA TYPE: Chronic | ICD-10-CM

## 2025-06-18 DIAGNOSIS — E66.9 OBESITY (BMI 30-39.9): Chronic | ICD-10-CM

## 2025-06-18 DIAGNOSIS — I10 ESSENTIAL HYPERTENSION: Chronic | ICD-10-CM

## 2025-06-18 DIAGNOSIS — I25.10 CORONARY ARTERY DISEASE INVOLVING NATIVE CORONARY ARTERY OF NATIVE HEART WITHOUT ANGINA PECTORIS: Primary | Chronic | ICD-10-CM

## 2025-06-18 DIAGNOSIS — E03.9 HYPOTHYROIDISM, UNSPECIFIED TYPE: Chronic | ICD-10-CM

## 2025-06-18 DIAGNOSIS — M15.9 GENERALIZED OSTEOARTHRITIS: Chronic | ICD-10-CM

## 2025-06-18 PROBLEM — E66.01 SEVERE OBESITY (BMI 35.0-39.9) WITH COMORBIDITY (HCC): Status: ACTIVE | Noted: 2018-05-17

## 2025-06-18 PROBLEM — Z87.19 HISTORY OF COLITIS: Chronic | Status: ACTIVE | Noted: 2021-04-06

## 2025-06-18 PROBLEM — E66.01 SEVERE OBESITY (BMI 35.0-39.9) WITH COMORBIDITY (HCC): Chronic | Status: ACTIVE | Noted: 2018-05-17

## 2025-06-18 PROBLEM — L43.8 EROSIVE ORAL LICHEN PLANUS: Status: RESOLVED | Noted: 2021-01-05 | Resolved: 2025-06-18

## 2025-06-18 PROBLEM — F33.0 MILD EPISODE OF RECURRENT MAJOR DEPRESSIVE DISORDER: Chronic | Status: ACTIVE | Noted: 2019-04-04

## 2025-06-18 PROBLEM — E66.01 SEVERE OBESITY (BMI 35.0-39.9) WITH COMORBIDITY (HCC): Status: RESOLVED | Noted: 2018-05-17 | Resolved: 2025-06-18

## 2025-06-18 PROCEDURE — 1090F PRES/ABSN URINE INCON ASSESS: CPT | Performed by: FAMILY MEDICINE

## 2025-06-18 PROCEDURE — 3078F DIAST BP <80 MM HG: CPT | Performed by: FAMILY MEDICINE

## 2025-06-18 PROCEDURE — 1159F MED LIST DOCD IN RCRD: CPT | Performed by: FAMILY MEDICINE

## 2025-06-18 PROCEDURE — G8417 CALC BMI ABV UP PARAM F/U: HCPCS | Performed by: FAMILY MEDICINE

## 2025-06-18 PROCEDURE — 3075F SYST BP GE 130 - 139MM HG: CPT | Performed by: FAMILY MEDICINE

## 2025-06-18 PROCEDURE — 1160F RVW MEDS BY RX/DR IN RCRD: CPT | Performed by: FAMILY MEDICINE

## 2025-06-18 PROCEDURE — 99349 HOME/RES VST EST MOD MDM 40: CPT | Performed by: FAMILY MEDICINE

## 2025-06-18 PROCEDURE — 1036F TOBACCO NON-USER: CPT | Performed by: FAMILY MEDICINE

## 2025-06-18 PROCEDURE — 1123F ACP DISCUSS/DSCN MKR DOCD: CPT | Performed by: FAMILY MEDICINE

## 2025-06-18 NOTE — PROGRESS NOTES
[facility] SERVICES      Primary Care At Home - Home Visit      Jessi Taylor      Address: [address] [place] [address] , [place] [id number]    :  1945  MRN:  [id number]      ASSESSMENT:     Diagnosis Orders   1. Coronary artery disease involving native coronary artery of native heart without angina pectoris        2. Essential hypertension        3. Multiple sclerosis (HCC)        4. Chronic obstructive pulmonary disease, unspecified COPD type (HCC)        5. Hyperlipidemia, unspecified hyperlipidemia type        6. Obesity (BMI 30-39.9)        7. Hypothyroidism, unspecified type        8. Generalized osteoarthritis        9. Age-related osteoporosis without current pathological fracture              PLAN:    CAD/ HTN: This problem is stable. Current treatment was continued as noted above. Will follow up in 3 months with labs at that time (Tuesday, ).    MS: This problem is stable. Discussed additional treatments if needed. Will request  PT for strengthening and safe ambulation.    COPD: This problem is stable. Will continue to monitor respiratory status and current treatment.    HYPERLIPIDEMIA/ OBESITY: This problem is stable. Will continue current treatment including diet, exercise and medication.    HYPOTHYROIDISM: This problem is stable. Will continue current treatment and close observation.    OSTEOARTHRITIS/ OSTEOPOROSIS: This problem is stable. Will continue steroids, heat, rubs, patches and medication.      SUBJECTIVE:    Chief Complaint:  Chief Complaint   Patient presents with    Other     HOME VISIT: MS/CAD/ HTN/ HYPERLIPIDEMIA       History of Present Illness:    Jessi Taylor is a 80 y.o. female who is seen for an [facility] Primary [facility] Home Visit. 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 It would be physically and emotionally difficult to take the patient from the home to seek primary care services in the community. The

## 2025-06-25 DIAGNOSIS — I10 PRIMARY HYPERTENSION: ICD-10-CM

## 2025-06-25 RX ORDER — LOSARTAN POTASSIUM AND HYDROCHLOROTHIAZIDE 25; 100 MG/1; MG/1
1 TABLET ORAL DAILY
Qty: 90 TABLET | Refills: 1 | Status: SHIPPED | OUTPATIENT
Start: 2025-06-25

## 2025-06-30 DIAGNOSIS — M54.41 CHRONIC BILATERAL LOW BACK PAIN WITH RIGHT-SIDED SCIATICA: ICD-10-CM

## 2025-06-30 DIAGNOSIS — G89.29 CHRONIC BILATERAL LOW BACK PAIN WITH RIGHT-SIDED SCIATICA: ICD-10-CM

## 2025-06-30 DIAGNOSIS — F33.0 MILD EPISODE OF RECURRENT MAJOR DEPRESSIVE DISORDER: ICD-10-CM

## 2025-06-30 RX ORDER — DULOXETIN HYDROCHLORIDE 30 MG/1
CAPSULE, DELAYED RELEASE ORAL
Qty: 90 CAPSULE | Refills: 1 | Status: SHIPPED | OUTPATIENT
Start: 2025-06-30

## 2025-06-30 NOTE — TELEPHONE ENCOUNTER
Fax received from [facility] requesting refill of Duloxetine.  Refill pended to Dr. Hines.

## 2025-07-23 DIAGNOSIS — G89.29 CHRONIC BILATERAL LOW BACK PAIN WITH RIGHT-SIDED SCIATICA: ICD-10-CM

## 2025-07-23 DIAGNOSIS — M54.41 CHRONIC BILATERAL LOW BACK PAIN WITH RIGHT-SIDED SCIATICA: ICD-10-CM

## 2025-07-23 DIAGNOSIS — G35 MULTIPLE SCLEROSIS (HCC): ICD-10-CM

## 2025-07-23 RX ORDER — HYDROCODONE BITARTRATE AND ACETAMINOPHEN 5; 325 MG/1; MG/1
1 TABLET ORAL DAILY PRN
Qty: 30 TABLET | Refills: 0 | Status: SHIPPED | OUTPATIENT
Start: 2025-07-23 | End: 2025-08-22

## 2025-07-23 NOTE — TELEPHONE ENCOUNTER
Medication Refill  HYDROcodone-acetaminophen (NORCO) 5-325 MG per tablet [[id number]]  [facility]       Pharmacy  [facility] [place], [place] - [address] [place] [address] -  [phone] - f [phone] [[id number]]     The patient has 2 pills left.  She is aware of the 24-48 business hour refill policy.  The patient's callback if needed is [phone] (Home).

## 2025-07-28 DIAGNOSIS — F33.0 MILD EPISODE OF RECURRENT MAJOR DEPRESSIVE DISORDER: ICD-10-CM

## 2025-07-28 RX ORDER — BUPROPION HYDROCHLORIDE 150 MG/1
150 TABLET ORAL EVERY MORNING
Qty: 90 TABLET | Refills: 1 | Status: SHIPPED | OUTPATIENT
Start: 2025-07-28

## 2025-08-13 DIAGNOSIS — G47.00 INSOMNIA, UNSPECIFIED TYPE: ICD-10-CM

## 2025-08-13 RX ORDER — QUETIAPINE FUMARATE 25 MG/1
25 TABLET, FILM COATED ORAL NIGHTLY
Qty: 90 TABLET | Refills: 1 | Status: SHIPPED | OUTPATIENT
Start: 2025-08-13

## 2025-09-02 DIAGNOSIS — G35 MULTIPLE SCLEROSIS (HCC): ICD-10-CM

## 2025-09-02 DIAGNOSIS — M54.41 CHRONIC BILATERAL LOW BACK PAIN WITH RIGHT-SIDED SCIATICA: ICD-10-CM

## 2025-09-02 DIAGNOSIS — G89.29 CHRONIC BILATERAL LOW BACK PAIN WITH RIGHT-SIDED SCIATICA: ICD-10-CM

## 2025-09-02 RX ORDER — HYDROCODONE BITARTRATE AND ACETAMINOPHEN 5; 325 MG/1; MG/1
1 TABLET ORAL DAILY PRN
Qty: 30 TABLET | Refills: 0 | Status: SHIPPED | OUTPATIENT
Start: 2025-09-02 | End: 2025-10-02

## 2025-09-02 RX ORDER — PREDNISONE 5 MG/1
5 TABLET ORAL DAILY
Qty: 90 TABLET | Refills: 1 | Status: SHIPPED | OUTPATIENT
Start: 2025-09-02

## 2025-09-03 ENCOUNTER — OFFICE VISIT (OUTPATIENT)
Facility: CLINIC | Age: 80
End: 2025-09-03
Payer: MEDICARE

## 2025-09-03 DIAGNOSIS — E66.9 OBESITY (BMI 30-39.9): Chronic | ICD-10-CM

## 2025-09-03 DIAGNOSIS — G35 MULTIPLE SCLEROSIS (HCC): Chronic | ICD-10-CM

## 2025-09-03 DIAGNOSIS — I10 ESSENTIAL HYPERTENSION: Chronic | ICD-10-CM

## 2025-09-03 DIAGNOSIS — J44.1 COPD EXACERBATION (HCC): Primary | ICD-10-CM

## 2025-09-03 DIAGNOSIS — I25.10 CORONARY ARTERY DISEASE INVOLVING NATIVE CORONARY ARTERY OF NATIVE HEART WITHOUT ANGINA PECTORIS: Chronic | ICD-10-CM

## 2025-09-03 PROCEDURE — 1090F PRES/ABSN URINE INCON ASSESS: CPT | Performed by: FAMILY MEDICINE

## 2025-09-03 PROCEDURE — 3075F SYST BP GE 130 - 139MM HG: CPT | Performed by: FAMILY MEDICINE

## 2025-09-03 PROCEDURE — 1036F TOBACCO NON-USER: CPT | Performed by: FAMILY MEDICINE

## 2025-09-03 PROCEDURE — G8417 CALC BMI ABV UP PARAM F/U: HCPCS | Performed by: FAMILY MEDICINE

## 2025-09-03 PROCEDURE — 1123F ACP DISCUSS/DSCN MKR DOCD: CPT | Performed by: FAMILY MEDICINE

## 2025-09-03 PROCEDURE — 99349 HOME/RES VST EST MOD MDM 40: CPT | Performed by: FAMILY MEDICINE

## 2025-09-03 PROCEDURE — 3078F DIAST BP <80 MM HG: CPT | Performed by: FAMILY MEDICINE

## 2025-09-03 RX ORDER — AZITHROMYCIN 250 MG/1
TABLET, FILM COATED ORAL
Qty: 6 TABLET | Refills: 0 | Status: SHIPPED | OUTPATIENT
Start: 2025-09-03 | End: 2025-09-13

## 2025-09-06 VITALS
SYSTOLIC BLOOD PRESSURE: 135 MMHG | OXYGEN SATURATION: 97 % | TEMPERATURE: 97.1 F | DIASTOLIC BLOOD PRESSURE: 71 MMHG | HEART RATE: 83 BPM | RESPIRATION RATE: 16 BRPM

## 2025-09-06 PROBLEM — M15.9 GENERALIZED OSTEOARTHRITIS: Status: ACTIVE | Noted: 2025-06-18
